# Patient Record
Sex: MALE | Race: WHITE | NOT HISPANIC OR LATINO | Employment: UNEMPLOYED | URBAN - NONMETROPOLITAN AREA
[De-identification: names, ages, dates, MRNs, and addresses within clinical notes are randomized per-mention and may not be internally consistent; named-entity substitution may affect disease eponyms.]

---

## 2018-01-29 ENCOUNTER — OFFICE VISIT (OUTPATIENT)
Dept: NEUROSURGERY | Facility: CLINIC | Age: 58
End: 2018-01-29

## 2018-01-29 VITALS — BODY MASS INDEX: 29.03 KG/M2 | HEIGHT: 69 IN | WEIGHT: 196 LBS

## 2018-01-29 DIAGNOSIS — R20.0 NUMBNESS IN FEET: ICD-10-CM

## 2018-01-29 DIAGNOSIS — I73.9 CLAUDICATION OF BOTH LOWER EXTREMITIES (HCC): ICD-10-CM

## 2018-01-29 DIAGNOSIS — M79.605 PAIN IN BOTH LOWER EXTREMITIES: ICD-10-CM

## 2018-01-29 DIAGNOSIS — M79.604 PAIN IN BOTH LOWER EXTREMITIES: ICD-10-CM

## 2018-01-29 DIAGNOSIS — M51.36 DEGENERATION OF LUMBAR INTERVERTEBRAL DISC: Primary | ICD-10-CM

## 2018-01-29 DIAGNOSIS — Z78.9 TOBACCO NON-USER: ICD-10-CM

## 2018-01-29 PROCEDURE — 99204 OFFICE O/P NEW MOD 45 MIN: CPT | Performed by: NEUROLOGICAL SURGERY
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
## 2018-01-30 DIAGNOSIS — I73.9 CLAUDICATION OF BOTH LOWER EXTREMITIES (HCC): Primary | ICD-10-CM

## 2018-01-30 DIAGNOSIS — R20.0 NUMBNESS IN FEET: ICD-10-CM

## 2018-02-16 DIAGNOSIS — M79.604 PAIN IN BOTH LOWER EXTREMITIES: ICD-10-CM

## 2018-02-16 DIAGNOSIS — R20.0 NUMBNESS IN FEET: ICD-10-CM

## 2018-02-16 DIAGNOSIS — M51.36 DEGENERATION OF LUMBAR INTERVERTEBRAL DISC: ICD-10-CM

## 2018-02-16 DIAGNOSIS — M79.605 PAIN IN BOTH LOWER EXTREMITIES: ICD-10-CM

## 2018-02-26 ENCOUNTER — TELEPHONE (OUTPATIENT)
Dept: NEUROSURGERY | Facility: CLINIC | Age: 58
End: 2018-02-26

## 2018-02-26 ENCOUNTER — HOSPITAL ENCOUNTER (OUTPATIENT)
Dept: MRI IMAGING | Facility: HOSPITAL | Age: 58
Discharge: HOME | End: 2018-02-26
Attending: NEUROLOGICAL SURGERY | Admitting: NEUROLOGICAL SURGERY

## 2018-02-26 ENCOUNTER — OFFICE VISIT (OUTPATIENT)
Dept: NEUROSURGERY | Facility: CLINIC | Age: 58
End: 2018-02-26

## 2018-02-26 ENCOUNTER — LAB (OUTPATIENT)
Dept: LAB | Facility: HOSPITAL | Age: 58
End: 2018-02-26
Attending: NEUROLOGICAL SURGERY

## 2018-02-26 ENCOUNTER — HOSPITAL ENCOUNTER (OUTPATIENT)
Dept: ULTRASOUND IMAGING | Facility: HOSPITAL | Age: 58
Discharge: HOME | End: 2018-02-26
Attending: NEUROLOGICAL SURGERY

## 2018-02-26 VITALS
BODY MASS INDEX: 28.79 KG/M2 | DIASTOLIC BLOOD PRESSURE: 82 MMHG | HEIGHT: 68 IN | WEIGHT: 190 LBS | SYSTOLIC BLOOD PRESSURE: 126 MMHG

## 2018-02-26 DIAGNOSIS — M51.36 DEGENERATION OF LUMBAR INTERVERTEBRAL DISC: ICD-10-CM

## 2018-02-26 DIAGNOSIS — M51.16 LUMBAR DISC HERNIATION WITH RADICULOPATHY: Primary | ICD-10-CM

## 2018-02-26 DIAGNOSIS — M51.16 LUMBAR DISC HERNIATION WITH RADICULOPATHY: ICD-10-CM

## 2018-02-26 DIAGNOSIS — Z78.9 TOBACCO NON-USER: ICD-10-CM

## 2018-02-26 DIAGNOSIS — E55.9 VITAMIN D DEFICIENCY: Primary | ICD-10-CM

## 2018-02-26 DIAGNOSIS — R20.0 NUMBNESS IN FEET: ICD-10-CM

## 2018-02-26 DIAGNOSIS — I73.9 CLAUDICATION OF BOTH LOWER EXTREMITIES (HCC): ICD-10-CM

## 2018-02-26 DIAGNOSIS — E58 CALCIUM DEFICIENCY: ICD-10-CM

## 2018-02-26 DIAGNOSIS — M79.605 PAIN IN BOTH LOWER EXTREMITIES: ICD-10-CM

## 2018-02-26 DIAGNOSIS — M79.604 PAIN IN BOTH LOWER EXTREMITIES: ICD-10-CM

## 2018-02-26 LAB
25(OH)D3 SERPL-MCNC: 27.6 NG/ML (ref 30–100)
CALCIUM SPEC-SCNC: 9.8 MG/DL (ref 8.4–10.4)
CREAT BLDA-MCNC: 1.1 MG/DL (ref 0.6–1.3)

## 2018-02-26 PROCEDURE — 99213 OFFICE O/P EST LOW 20 MIN: CPT | Performed by: NEUROLOGICAL SURGERY

## 2018-02-26 PROCEDURE — 72158 MRI LUMBAR SPINE W/O & W/DYE: CPT

## 2018-02-26 PROCEDURE — 82310 ASSAY OF CALCIUM: CPT | Performed by: NEUROLOGICAL SURGERY

## 2018-02-26 PROCEDURE — 36415 COLL VENOUS BLD VENIPUNCTURE: CPT

## 2018-02-26 PROCEDURE — 93924 LWR XTR VASC STDY BILAT: CPT | Performed by: SURGERY

## 2018-02-26 PROCEDURE — A9577 INJ MULTIHANCE: HCPCS | Performed by: NEUROLOGICAL SURGERY

## 2018-02-26 PROCEDURE — 93922 UPR/L XTREMITY ART 2 LEVELS: CPT

## 2018-02-26 PROCEDURE — 82306 VITAMIN D 25 HYDROXY: CPT | Performed by: NEUROLOGICAL SURGERY

## 2018-02-26 PROCEDURE — 0 GADOBENATE DIMEGLUMINE 529 MG/ML SOLUTION: Performed by: NEUROLOGICAL SURGERY

## 2018-02-26 PROCEDURE — 82565 ASSAY OF CREATININE: CPT

## 2018-02-26 RX ORDER — CALCIUM CARBONATE 500(1250)
500 TABLET ORAL 2 TIMES DAILY
Qty: 60 TABLET | Refills: 5 | Status: SHIPPED | OUTPATIENT
Start: 2018-02-26 | End: 2018-03-28

## 2018-02-26 RX ORDER — ERGOCALCIFEROL 1.25 MG/1
50000 CAPSULE ORAL WEEKLY
Qty: 4 CAPSULE | Refills: 0 | Status: SHIPPED | OUTPATIENT
Start: 2018-02-26 | End: 2018-03-20

## 2018-02-26 RX ADMIN — GADOBENATE DIMEGLUMINE 17 ML: 529 INJECTION, SOLUTION INTRAVENOUS at 09:15

## 2018-02-26 NOTE — TELEPHONE ENCOUNTER
----- Message from Mehran Almaraz MD sent at 2/26/2018  1:50 PM CST -----  Vitamin D  50,000 international units by mouth per week ×4 weeks followed by,  1000 international units by mouth daily    Calcium  The [place] [employer] (IOM) has issued recommendations for calcium and vitamin D daily intake in older adults.     Men, 51-70 years = 1000 mg/day of calcium     He needs to start this today.
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
## 2018-03-01 DIAGNOSIS — M51.36 DEGENERATION OF LUMBAR INTERVERTEBRAL DISC: ICD-10-CM

## 2018-03-01 DIAGNOSIS — I73.9 CLAUDICATION OF BOTH LOWER EXTREMITIES (HCC): ICD-10-CM

## 2018-03-01 DIAGNOSIS — M51.16 LUMBAR DISC HERNIATION WITH RADICULOPATHY: Primary | ICD-10-CM

## 2018-03-02 ENCOUNTER — APPOINTMENT (OUTPATIENT)
Dept: MRI IMAGING | Facility: HOSPITAL | Age: 58
End: 2018-03-02
Attending: NEUROLOGICAL SURGERY

## 2018-03-05 ENCOUNTER — APPOINTMENT (OUTPATIENT)
Dept: ULTRASOUND IMAGING | Facility: HOSPITAL | Age: 58
End: 2018-03-05
Attending: NEUROLOGICAL SURGERY

## 2018-03-20 ENCOUNTER — APPOINTMENT (OUTPATIENT)
Dept: PREADMISSION TESTING | Facility: HOSPITAL | Age: 58
End: 2018-03-20

## 2018-03-20 ENCOUNTER — HOSPITAL ENCOUNTER (OUTPATIENT)
Dept: PULMONOLOGY | Facility: HOSPITAL | Age: 58
Discharge: HOME | End: 2018-03-20
Attending: NEUROLOGICAL SURGERY | Admitting: NEUROLOGICAL SURGERY

## 2018-03-20 ENCOUNTER — HOSPITAL ENCOUNTER (OUTPATIENT)
Dept: GENERAL RADIOLOGY | Facility: HOSPITAL | Age: 58
Discharge: HOME | End: 2018-03-20
Attending: NEUROLOGICAL SURGERY

## 2018-03-20 VITALS
RESPIRATION RATE: 14 BRPM | WEIGHT: 189.6 LBS | DIASTOLIC BLOOD PRESSURE: 86 MMHG | SYSTOLIC BLOOD PRESSURE: 145 MMHG | HEART RATE: 63 BPM | BODY MASS INDEX: 28.08 KG/M2 | OXYGEN SATURATION: 97 % | HEIGHT: 69 IN

## 2018-03-20 DIAGNOSIS — I73.9 CLAUDICATION OF BOTH LOWER EXTREMITIES (HCC): ICD-10-CM

## 2018-03-20 DIAGNOSIS — M51.16 LUMBAR DISC HERNIATION WITH RADICULOPATHY: ICD-10-CM

## 2018-03-20 DIAGNOSIS — Z78.9 TOBACCO NON-USER: ICD-10-CM

## 2018-03-20 DIAGNOSIS — M51.36 DEGENERATION OF LUMBAR INTERVERTEBRAL DISC: ICD-10-CM

## 2018-03-20 LAB
ANION GAP SERPL CALCULATED.3IONS-SCNC: 11 MMOL/L (ref 4–13)
BUN BLD-MCNC: 12 MG/DL (ref 5–21)
BUN/CREAT SERPL: 11 (ref 7–25)
CALCIUM SPEC-SCNC: 9.9 MG/DL (ref 8.4–10.4)
CHLORIDE SERPL-SCNC: 100 MMOL/L (ref 98–110)
CO2 SERPL-SCNC: 30 MMOL/L (ref 24–31)
CREAT BLD-MCNC: 1.09 MG/DL (ref 0.5–1.4)
DEPRECATED RDW RBC AUTO: 42.1 FL (ref 40–54)
ERYTHROCYTE [DISTWIDTH] IN BLOOD BY AUTOMATED COUNT: 13.8 % (ref 12–15)
GFR SERPL CREATININE-BSD FRML MDRD: 70 ML/MIN/1.73
GLUCOSE BLD-MCNC: 77 MG/DL (ref 70–100)
HCT VFR BLD AUTO: 44.2 % (ref 40–52)
HGB BLD-MCNC: 14.9 G/DL (ref 14–18)
MCH RBC QN AUTO: 28.3 PG (ref 28–32)
MCHC RBC AUTO-ENTMCNC: 33.7 G/DL (ref 33–36)
MCV RBC AUTO: 83.9 FL (ref 82–95)
PLATELET # BLD AUTO: 264 10*3/MM3 (ref 130–400)
PMV BLD AUTO: 10.4 FL (ref 6–12)
POTASSIUM BLD-SCNC: 3.9 MMOL/L (ref 3.5–5.3)
RBC # BLD AUTO: 5.27 10*6/MM3 (ref 4.8–5.9)
SODIUM BLD-SCNC: 141 MMOL/L (ref 135–145)
WBC NRBC COR # BLD: 10.62 10*3/MM3 (ref 4.8–10.8)

## 2018-03-20 PROCEDURE — 36415 COLL VENOUS BLD VENIPUNCTURE: CPT

## 2018-03-20 PROCEDURE — 93005 ELECTROCARDIOGRAM TRACING: CPT

## 2018-03-20 PROCEDURE — 94010 BREATHING CAPACITY TEST: CPT

## 2018-03-20 PROCEDURE — 93010 ELECTROCARDIOGRAM REPORT: CPT | Performed by: INTERNAL MEDICINE

## 2018-03-20 PROCEDURE — 72170 X-RAY EXAM OF PELVIS: CPT

## 2018-03-20 PROCEDURE — 85027 COMPLETE CBC AUTOMATED: CPT | Performed by: NEUROLOGICAL SURGERY

## 2018-03-20 PROCEDURE — 80048 BASIC METABOLIC PNL TOTAL CA: CPT | Performed by: NEUROLOGICAL SURGERY

## 2018-03-20 RX ORDER — ACETAMINOPHEN 325 MG/1
650 TABLET ORAL EVERY 6 HOURS PRN
COMMUNITY
End: 2018-04-03 | Stop reason: HOSPADM

## 2018-03-20 NOTE — DISCHARGE INSTRUCTIONS
DAY OF SURGERY INSTRUCTIONS        YOUR SURGEON: DR TIARA AVERY/ DR EFREM FELICIANO      PROCEDURE: LUMBAR LAMINECTOMY - ANTERIOR LUMBAR INTERBODY FUSION, LUMBAR 5- SACRAL 1, ANTERIOR FUSION AND THEN POSTERIOR MINIMALLY INVASIVE LUMBAR 5 TO SACRAL 1 - LEFT DECOMPRESSION AND INSTRUMENTATION, O- ARM ANTERIOR LUMBAR EXPOSURE    DATE OF SURGERY: MARCH 30, 2018    ARRIVAL TIME: AS DIRECTED BY OFFICE    DAY OF SURGERY TAKE ONLY THESE MEDICATIONS: NONE        BEFORE YOU COME TO THE HOSPITAL  (Pre-op instructions)  • Do not eat, drink, smoke or chew gum after midnight the night before surgery.  This also includes no mints.  • Morning of surgery take only the medicines you have been instructed with a sip of water unless otherwise instructed  by your physician.  • Do not shave, wear makeup or dark nail polish.  • Remove all jewelry including rings.  • Leave anything you consider valuable at home.  • Leave your suitcase in the car until after your surgery.  • Bring the following with you if applicable:  o Picture ID and insurance, Medicare or Medicaid cards  o Co-pay/deductible required by insurance (cash, check, credit card)  o Copy of advance directive, living will or power-of- documents if not brought to PAT  o CPAP or BIPAP mask and tubing  o Relaxation aids (MP3 player, book, magazine)  • On the day of surgery check in at registration located at the main entrance of the hospital.       Outpatient Surgery Guidelines, Adult  Outpatient procedures are those for which the person having the procedure is allowed to go home the same day as the procedure. Various procedures are done on an outpatient basis. You should follow some general guidelines if you will be having an outpatient procedure.  LET YOUR HEALTH CARE PROVIDER KNOW ABOUT:  · Any allergies you have.  · All medicines you are taking, including vitamins, herbs, eye drops, creams, and over-the-counter medicines.  · Previous problems you or members of your  family have had with the use of anesthetics.  · Any blood disorders you have.  · Previous surgeries you have had.  · Medical conditions you have.  RISKS AND COMPLICATIONS  Your health care provider will discuss possible risks and complications with you before surgery. Common risks and complications include:    · Problems due to the use of anesthetics.  · Blood loss and replacement (does not apply to minor surgical procedures).  · Temporary increase in pain due to surgery.  · Uncorrected pain or problems that the surgery was meant to correct.  · Infection.  · New damage.  BEFORE THE PROCEDURE  · Ask your health care provider about changing or stopping your regular medicines. You may need to stop taking certain medicines in the days or weeks before the procedure.  · Stop smoking at least 2 weeks before surgery. This lowers your risk for complications during and after surgery. Ask your health care provider for help with this if needed.  · Eat your usual meals and a light supper the day before surgery. Continue fluid intake. Do not drink alcohol.  · Do not eat or drink after midnight the night before your surgery.   · Arrange for someone to take you home and to stay with you for 24 hours after the procedure. Medicine given for your procedure may affect your ability to drive or to care for yourself.  · Call your health care provider's office if you develop an illness or problem that may prevent you from safely having your procedure.  AFTER THE PROCEDURE  After surgery, you will be taken to a recovery area, where your progress will be monitored. If there are no complications, you will be allowed to go home when you are awake, stable, and taking fluids well. You may have numbness around the surgical site. Healing will take some time. You will have tenderness at the surgical site and may have some swelling and bruising. You may also have some nausea.  HOME CARE INSTRUCTIONS  · Do not drive for 24 hours, or as directed by  your health care provider. Do not drive while taking prescription pain medicines.  · Do not drink alcohol for 24 hours.  · Do not make important decisions or sign legal documents for 24 hours.  · You may resume a normal diet and activities as directed.  · Do not lift anything heavier than 10 pounds (4.5 kg) or play contact sports until your health care provider says it is okay.  · Change your bandages (dressings) as directed.  · Only take over-the-counter or prescription medicines as directed by your health care provider.  · Follow up with your health care provider as directed.  SEEK MEDICAL CARE IF:  · You have increased bleeding (more than a small spot) from the surgical site.  · You have redness, swelling, or increasing pain in the wound.  · You see pus coming from the wound.  · You have a fever.  · You notice a bad smell coming from the wound or dressing.  · You feel lightheaded or faint.  · You develop a rash.  · You have trouble breathing.  · You develop allergies.  MAKE SURE YOU:  · Understand these instructions.  · Will watch your condition.  · Will get help right away if you are not doing well or get worse.     This information is not intended to replace advice given to you by your health care provider. Make sure you discuss any questions you have with your health care provider.     Document Released: 09/12/2002 Document Revised: 05/03/2016 Document Reviewed: 05/22/2014  [employer] Interactive Patient Education ©2016 [employer]       Fall Prevention in Hospitals, Adult  As a hospital patient, your condition and the treatments you receive can increase your risk for falls. Some additional risk factors for falls in a hospital include:  · Being in an unfamiliar environment.  · Being on bed rest.  · Your surgery.  · Taking certain medicines.  · Your tubing requirements, such as intravenous (IV) therapy or catheters.  It is important that you learn how to decrease fall risks while at the hospital. Below are  important tips that can help prevent falls.  SAFETY TIPS FOR PREVENTING FALLS  Talk about your risk of falling.  · Ask your health care provider why you are at risk for falling. Is it your medicine, illness, tubing placement, or something else?  · Make a plan with your health care provider to keep you safe from falls.  · Ask your health care provider or pharmacist about side effects of your medicines. Some medicines can make you dizzy or affect your coordination.  Ask for help.  · Ask for help before getting out of bed. You may need to press your call button.  · Ask for assistance in getting safely to the toilet.  · Ask for a walker or cane to be put at your bedside. Ask that most of the side rails on your bed be placed up before your health care provider leaves the room.  · Ask family or friends to sit with you.  · Ask for things that are out of your reach, such as your glasses, hearing aids, telephone, bedside table, or call button.  Follow these tips to avoid falling:  · Stay lying or seated, rather than standing, while waiting for help.  · Wear rubber-soled slippers or shoes whenever you walk in the hospital.  · Avoid quick, sudden movements.  ¨ Change positions slowly.  ¨ Sit on the side of your bed before standing.  ¨ Stand up slowly and wait before you start to walk.  · Let your health care provider know if there is a spill on the floor.  · Pay careful attention to the medical equipment, electrical cords, and tubes around you.  · When you need help, use your call button by your bed or in the bathroom. Wait for one of your health care providers to help you.  · If you feel dizzy or unsure of your footing, return to bed and wait for assistance.  · Avoid being distracted by the TV, telephone, or another person in your room.  · Do not lean or support yourself on rolling objects, such as IV poles or bedside tables.     This information is not intended to replace advice given to you by your health care provider.  Make sure you discuss any questions you have with your health care provider.     Document Released: 12/15/2001 Document Revised: 01/08/2016 Document Reviewed: 08/25/2013  [employer] Interactive Patient Education ©2016 [employer]       Surgical Site Infections FAQs  What is a Surgical Site Infection (SSI)?  A surgical site infection is an infection that occurs after surgery in the part of the body where the surgery took place. Most patients who have surgery do not develop an infection. However, infections develop in about 1 to 3 out of every 100 patients who have surgery.  Some of the common symptoms of a surgical site infection are:  · Redness and pain around the area where you had surgery  · Drainage of cloudy fluid from your surgical wound  · Fever  Can SSIs be treated?  Yes. Most surgical site infections can be treated with antibiotics. The antibiotic given to you depends on the bacteria (germs) causing the infection. Sometimes patients with SSIs also need another surgery to treat the infection.  What are some of the things that hospitals are doing to prevent SSIs?  To prevent SSIs, doctors, nurses, and other healthcare providers:  · Clean their hands and arms up to their elbows with an antiseptic agent just before the surgery.  · Clean their hands with soap and water or an alcohol-based hand rub before and after caring for each patient.  · May remove some of your hair immediately before your surgery using electric clippers if the hair is in the same area where the procedure will occur. They should not shave you with a razor.  · Wear special hair covers, masks, gowns, and gloves during surgery to keep the surgery area clean.  · Give you antibiotics before your surgery starts. In most cases, you should get antibiotics within 60 minutes before the surgery starts and the antibiotics should be stopped within 24 hours after surgery.  · Clean the skin at the site of your surgery with a special soap that kills  germs.  What can I do to help prevent SSIs?  Before your surgery:  · Tell your doctor about other medical problems you may have. Health problems such as allergies, diabetes, and obesity could affect your surgery and your treatment.  · Quit smoking. Patients who smoke get more infections. Talk to your doctor about how you can quit before your surgery.  · Do not shave near where you will have surgery. Shaving with a razor can irritate your skin and make it easier to develop an infection.  At the time of your surgery:  · Speak up if someone tries to shave you with a razor before surgery. Ask why you need to be shaved and talk with your surgeon if you have any concerns.  · Ask if you will get antibiotics before surgery.  After your surgery:  · Make sure that your healthcare providers clean their hands before examining you, either with soap and water or an alcohol-based hand rub.  · If you do not see your providers clean their hands, please ask them to do so.  · Family and friends who visit you should not touch the surgical wound or dressings.  · Family and friends should clean their hands with soap and water or an alcohol-based hand rub before and after visiting you. If you do not see them clean their hands, ask them to clean their hands.  What do I need to do when I go home from the hospital?  · Before you go home, your doctor or nurse should explain everything you need to know about taking care of your wound. Make sure you understand how to care for your wound before you leave the hospital.  · Always clean your hands before and after caring for your wound.  · Before you go home, make sure you know who to contact if you have questions or problems after you get home.  · If you have any symptoms of an infection, such as redness and pain at the surgery site, drainage, or fever, call your doctor immediately.  If you have additional questions, please ask your doctor or nurse.  Developed and co-sponsored by The Society for  Healthcare Epidemiology of [place] (SHEA); Infectious Diseases Society of [place] (IDSA); American Hospital Association; Association for Professionals in Infection Control and Epidemiology (APIC); Centers for Disease Control and Prevention (CDC); and The Joint Commission.     This information is not intended to replace advice given to you by your health care provider. Make sure you discuss any questions you have with your health care provider.     Document Released: 12/23/2014 Document Revised: 01/08/2016 Document Reviewed: 03/02/2016  [employer] Interactive Patient Education ©2016 [employer]       [facility]  CHG 4% Patient Instruction Sheet    Preparing the Skin Before Surgery  Preparing or “prepping” skin before surgery can reduce the risk of infection at the surgical site. To make the process easier,[facility] has chosen 4% Chlorhexidine Gluconate (CHG) antiseptic solution.   The steps below outline the prepping process and should be carefully followed.                                                                                                                                                      Prep the skin at the following time(s):                                                      We recommend you shower the night before surgery, and again the morning of surgery with the 4% CHG antiseptic solution using half of the bottle and a cloth each time.  Dress in clean clothes/sleepwear after showering.  See instructions below for application.          Do not apply any lotions or moisturizers.       Do not shave the area to be prepped for at least 2 days prior to surgery.    Clipping the hair may be done immediately prior to your surgery at the hospital    if needed.    Directions:  Thoroughly rinse your body with water.  Apply 4% CHG to a cloth and wash skin gently, paying special attention to the operative site.  Rinse again thoroughly.  Once you have begun using this product do not apply anything else  to your skin. If itching or redness persists, rinse affected areas and discontinue use.    When using this product:  • Keep out of eyes, ears, and mouth.  • If solution should contact these areas, rinse out promptly and thoroughly with water.  • For external use only.  • Do not use in genital area, on your face or head.      PATIENT/FAMILY/RESPONSIBLE PARTY VERBALIZES UNDERSTANDING OF ABOVE EDUCATION.  COPY OF PAIN SCALE GIVEN AND REVIEWED WITH VERBALIZED UNDERSTANDING.

## 2018-03-27 ENCOUNTER — OFFICE VISIT (OUTPATIENT)
Dept: VASCULAR SURGERY | Facility: CLINIC | Age: 58
End: 2018-03-27

## 2018-03-27 VITALS
HEIGHT: 70 IN | SYSTOLIC BLOOD PRESSURE: 136 MMHG | HEART RATE: 70 BPM | BODY MASS INDEX: 27.92 KG/M2 | DIASTOLIC BLOOD PRESSURE: 78 MMHG | WEIGHT: 195 LBS | OXYGEN SATURATION: 98 %

## 2018-03-27 DIAGNOSIS — R20.0 NUMBNESS IN FEET: ICD-10-CM

## 2018-03-27 DIAGNOSIS — M79.605 PAIN IN BOTH LOWER EXTREMITIES: ICD-10-CM

## 2018-03-27 DIAGNOSIS — M51.16 LUMBAR DISC HERNIATION WITH RADICULOPATHY: Primary | ICD-10-CM

## 2018-03-27 DIAGNOSIS — M79.604 PAIN IN BOTH LOWER EXTREMITIES: ICD-10-CM

## 2018-03-27 PROCEDURE — 99204 OFFICE O/P NEW MOD 45 MIN: CPT | Performed by: SURGERY

## 2018-03-27 NOTE — PROGRESS NOTES
"03/27/2018      Mehran Almaraz MD  [address] [facility]  [address]  [place] [address]    Eric Caldera  1960    Chief Complaint   Patient presents with   • Follow-up     Patient here for ALIF. Patient states no stroke like symptoms       Dear Mehran Almaraz, *:      HPI  I had the pleasure of seeing your patient Eric Caldera in the office today.  Thank you kindly for this consultation.  As you recall, Eric Caldera is a 57 y.o.  male who you are currently following for back pain.  He had a new onset of back pain and left leg numbness.  Imaging showed L5/S1 foraminal stenosis, and displacement of nerve roots.  He has undergone physical therapy and stopped smoking, but continues with pain and numbness.  He is scheduled for anterior lumbar interbody fusion on 3/30/18.      Past Medical History:   Diagnosis Date   • Arthritis    • Depression    • Hypertension    • Kidney stones    • Migraine    • Thyroid disease        Past Surgical History:   Procedure Laterality Date   • ARM TENDON REPAIR Left 2011    \"Rebuilding\" of tendons   • LEG SURGERY Right 2009    Removal of bone tumor   • LUMBAR SPINE SURGERY  2005       Family History   Problem Relation Age of Onset   • Cancer Father        Social History     Social History   • Marital status:      Spouse name: N/A   • Number of children: N/A   • Years of education: N/A     Occupational History   • Not on file.     Social History Main Topics   • Smoking status: Former Smoker     Packs/day: 0.25     Years: 20.00     Types: Cigarettes     Quit date: 2/20/2018   • Smokeless tobacco: Never Used   • Alcohol use No   • Drug use: No   • Sexual activity: Defer     Other Topics Concern   • Not on file     Social History Narrative   • No narrative on file       Allergies   Allergen Reactions   • Naproxen GI Bleeding   • Nsaids GI Bleeding   • Prednisone GI Bleeding       Prior to Admission medications    Medication Sig Start Date End Date Taking? " "Authorizing Provider   acetaminophen (TYLENOL) 325 MG tablet Take 650 mg by mouth Every 6 (Six) Hours As Needed for Mild Pain .    Historical Provider, MD   busPIRone (BUSPAR) 10 MG tablet Take 10 mg by mouth 3 (Three) Times a Day.    Historical Provider, MD   Calcium 500 MG tablet Take 500 mg by mouth 2 (Two) Times a Day for 30 days. 2/26/18 3/28/18  Mehran Almaraz MD   Cholecalciferol (VITAMIN D) 1000 units tablet Take 1 tablet by mouth Daily for 30 days. 2/26/18 3/28/18  Mehran Almaraz MD   hydrOXYzine (ATARAX) 25 MG tablet Take 25 mg by mouth 3 (Three) Times a Day As Needed for Itching.    Historical Provider, MD   levothyroxine (SYNTHROID, LEVOTHROID) 75 MCG tablet Take 75 mcg by mouth Daily.    Historical Provider, MD   sertraline (ZOLOFT) 50 MG tablet Take 200 mg by mouth Daily.    Historical Provider, MD       Review of Systems   Constitutional: Negative.    HENT: Negative.    Eyes: Negative.    Respiratory: Negative.    Cardiovascular: Negative.    Gastrointestinal: Negative.    Endocrine: Negative.    Genitourinary: Negative.    Musculoskeletal: Positive for back pain, gait problem and myalgias.   Skin: Negative.    Allergic/Immunologic: Negative.    Neurological: Positive for numbness.   Hematological: Negative.    Psychiatric/Behavioral: Negative.    All other systems reviewed and are negative.      /78   Pulse 70   Ht 176.5 cm (69.5\")   Wt 88.5 kg (195 lb)   SpO2 98%   BMI 28.38 kg/m²   Physical Exam   Constitutional: He is oriented to person, place, and time. He appears well-developed and well-nourished.   HENT:   Head: Normocephalic and atraumatic.   Eyes: Pupils are equal, round, and reactive to light. No scleral icterus.   Neck: Neck supple. No JVD present. Carotid bruit is not present. No thyromegaly present.   Cardiovascular: Normal rate, regular rhythm and normal heart sounds.    Pulses:       Carotid pulses are 2+ on the right side, and 2+ on the left side.       " Femoral pulses are 2+ on the right side, and 2+ on the left side.       Popliteal pulses are 2+ on the right side, and 2+ on the left side.        Dorsalis pedis pulses are 2+ on the right side, and 2+ on the left side.        Posterior tibial pulses are 2+ on the right side, and 2+ on the left side.   Pulmonary/Chest: Effort normal and breath sounds normal.   Abdominal: Soft. Bowel sounds are normal. He exhibits no distension, no abdominal bruit and no mass. There is no hepatosplenomegaly. There is no tenderness.   Musculoskeletal: Normal range of motion. He exhibits no edema.   Lymphadenopathy:     He has no cervical adenopathy.   Neurological: He is alert and oriented to person, place, and time. He has normal strength. No cranial nerve deficit or sensory deficit.   Skin: Skin is warm, dry and intact.   Psychiatric: He has a normal mood and affect.   Nursing note and vitals reviewed.      Xr Pelvis 1 Or 2 Vw    Result Date: 3/20/2018  Narrative: EXAMINATION:  XR PELVIS 1 OR 2 VW-  3/20/2018 12:32 PM CDT  HISTORY: Lumbar disc herniation, needing XR for surgical determination; M51.16-Intervertebral disc disorders with radiculopathy, lumbar region; I73.9-Peripheral vascular disease, unspecified; M51.36-Other intervertebral disc degeneration, lumbar region  COMPARISON: No comparison study.  FINDINGS:  AP and lateral views were obtained of the pelvis. The hip joint spaces are maintained. There is minimal enthesopathy of the pelvis. The SI joints are normal. There are some degenerative changes of the visualized lumbar spine. Coarse calcifications in the left pelvis are probably phleboliths.      Impression: No acute findings.   This report was finalized on 03/20/2018 13:09 by Dr. Андрей Gil MD.    Us Ankle / Brachial Indices Extremity Limited    Result Date: 2/26/2018  Narrative:  History: Claudication  Comments: Bilateral lower extremity arterial with multi-level pulse volume recordings and segmental pressures were  performed at rest and stress.  The right ankle/brachial index is 1.15. The waveforms are triphasic without dampening.These findings are consistent with no significant arterial insufficiency of the right lower extremity at rest.  The postexercise ABIs 1.37.  The left ankle/brachial index is 1.20. The waveforms are triphasic without dampening. These findings are consistent with no significant arterial insufficiency of the left lower extremity at rest.   Postexercise ABIs 1.43.      Impression: Impression: 1. No significant arterial insufficiency of the right lower extremity at rest. 2. No significant arterial insufficiency of the left lower extremity at rest.   This report was finalized on 02/26/2018 14:22 by Dr. Samm Robb MD.    Mri Lumbar Spine With & Without Contrast    Result Date: 2/26/2018  Narrative:  History: 57-year-old evaluated for lumbar disc degeneration. Bilateral lower extremity pain and numbness. History of back surgery in 2005. Per chart review the surgery was related to removal of a bone tumor.  Reference Lumbar spine MRI September 2014.  Technique Pre and postcontrast enhanced lumbar spine MRI was performed.  Findings: Minimal subchondral edema involving the anterior/superior L2 vertebrae. Appearance is unchanged and this is of doubtful significance. Minimal endplate degenerative changes involving the inferior right L5 vertebral body. There are no suspicious marrow lesions. Lumbar vertebral body heights are unchanged. Alignment is normal. The conus is barely imaged but appears to in that T12.. There are postoperative changes in the dorsal superficial soft tissues at the level of L5. There is suspicion for left laminotomy at L5-S1 although partially imaged on axial sequences.  L1-L2 Minimal disc bulge without spinal stenosis. No neuroforaminal narrowing.  L2-L3 No spinal canal or foraminal narrowing.  L3-L4 Small left foraminal protrusion causing moderate left neuroforaminal narrowing. Mild  narrowing of the right neural foramen. There is no spinal stenosis.  L4-L5 Mild broad-based disc bulge with osteophyte component in the right neural foramen. Mild facet arthropathy. Findings result in moderate right neuroforaminal narrowing mild to moderate left neuroforaminal narrowing.  L5-S1 Superimposed upon a broad-based disc bulge is a left paramedian inferior extrusion measuring approximately 11 mm in craniocaudal dimension and causing left lateral recess stenosis. Size of this extrusion has decreased from 2014 likely from discectomy. There is suspected left laminotomy at this level. There is enhancement surrounding the mildly enlarged descending left S1 nerve root. Given lack of correlating T2 hyperintensity, this is felt to reflect vascular changes related to surgery rather than perineural fibrosis. There is moderate bilateral neural foraminal narrowing.       Impression: Postoperative changes at L5-S1 as described. Variable neuroforaminal narrowing.  This report was finalized on 02/26/2018 09:44 by  Dennis Jimenes, .      Patient Active Problem List   Diagnosis   • Degeneration of lumbar intervertebral disc   • Pain in both lower extremities   • Numbness in feet   • Claudication of both lower extremities   • BMI 28.0-28.9,adult   • Tobacco non-user   • Lumbar disc herniation with radiculopathy         ICD-10-CM ICD-9-CM   1. Lumbar disc herniation with radiculopathy M51.16 722.10   2. Pain in both lower extremities M79.604 729.5    M79.605    3. Numbness in feet R20.0 782.0       Lab Frequency Next Occurrence   Type and screen Once 02/26/2018       Plan: After thoroughly evaluating Eric RAMIREZ Werner, I believe the best course of action is to proceed with anterior lumbar interbody fusion Of L5-S1.  Risks of ALIF were discussed and include, but are not limited to, bleeding, infection, nerve damage, vessel damage, retrograde ejaculation, bowel damage, ureteral damage, DVT, MI, stroke, and death.  The patient  understands these risks and wishes to proceed with procedure.   The patient can continue taking her current medication regimen as previously planned.  This was all discussed in full with complete understanding.    Thank you for allowing me to participate in the care of your patient.  Please do not hesitate with any questions or concerns.  I will keep you aware of any further encounters with Eric Caldera.        Sincerely yours,         Samm Robb, YOLANDA Ocasio

## 2018-03-29 ENCOUNTER — ANESTHESIA EVENT (OUTPATIENT)
Dept: PERIOP | Facility: HOSPITAL | Age: 58
End: 2018-03-29

## 2018-03-30 ENCOUNTER — APPOINTMENT (OUTPATIENT)
Dept: GENERAL RADIOLOGY | Facility: HOSPITAL | Age: 58
End: 2018-03-30

## 2018-03-30 ENCOUNTER — HOSPITAL ENCOUNTER (INPATIENT)
Facility: HOSPITAL | Age: 58
LOS: 4 days | Discharge: HOME | End: 2018-04-03
Attending: NEUROLOGICAL SURGERY | Admitting: NEUROLOGICAL SURGERY

## 2018-03-30 ENCOUNTER — ANESTHESIA (OUTPATIENT)
Dept: PERIOP | Facility: HOSPITAL | Age: 58
End: 2018-03-30

## 2018-03-30 DIAGNOSIS — Z78.9 DECREASED ACTIVITIES OF DAILY LIVING (ADL): ICD-10-CM

## 2018-03-30 DIAGNOSIS — Z74.09 IMPAIRED MOBILITY: ICD-10-CM

## 2018-03-30 DIAGNOSIS — M51.16 LUMBAR DISC HERNIATION WITH RADICULOPATHY: ICD-10-CM

## 2018-03-30 LAB
ABO GROUP BLD: NORMAL
BLD GP AB SCN SERPL QL: NEGATIVE
RH BLD: POSITIVE
T&S EXPIRATION DATE: NORMAL

## 2018-03-30 PROCEDURE — 94799 UNLISTED PULMONARY SVC/PX: CPT

## 2018-03-30 PROCEDURE — 72100 X-RAY EXAM L-S SPINE 2/3 VWS: CPT

## 2018-03-30 PROCEDURE — 22856 TOT DISC ARTHRP 1NTRSPC CRV: CPT | Performed by: NEUROLOGICAL SURGERY

## 2018-03-30 PROCEDURE — 76380 CAT SCAN FOLLOW-UP STUDY: CPT

## 2018-03-30 PROCEDURE — 01NB0ZZ RELEASE LUMBAR NERVE, OPEN APPROACH: ICD-10-PCS | Performed by: NEUROLOGICAL SURGERY

## 2018-03-30 PROCEDURE — 25010000003 CEFAZOLIN PER 500 MG: Performed by: NEUROLOGICAL SURGERY

## 2018-03-30 PROCEDURE — 0ST40ZZ RESECTION OF LUMBOSACRAL DISC, OPEN APPROACH: ICD-10-PCS | Performed by: NEUROLOGICAL SURGERY

## 2018-03-30 PROCEDURE — C1713 ANCHOR/SCREW BN/BN,TIS/BN: HCPCS | Performed by: NEUROLOGICAL SURGERY

## 2018-03-30 PROCEDURE — 25010000002 MIDAZOLAM PER 1 MG: Performed by: ANESTHESIOLOGY

## 2018-03-30 PROCEDURE — 25010000002 HYDROMORPHONE HCL-NACL 50-0.9 MG/50ML-% SOLUTION PREFILLED SYRINGE: Performed by: NEUROLOGICAL SURGERY

## 2018-03-30 PROCEDURE — 63030 LAMOT DCMPRN NRV RT 1 LMBR: CPT | Performed by: NEUROLOGICAL SURGERY

## 2018-03-30 PROCEDURE — 74018 RADEX ABDOMEN 1 VIEW: CPT

## 2018-03-30 PROCEDURE — 25010000002 ONDANSETRON PER 1 MG: Performed by: ANESTHESIOLOGY

## 2018-03-30 PROCEDURE — 25010000002 PROPOFOL 1000 MG/ML EMULSION: Performed by: NURSE ANESTHETIST, CERTIFIED REGISTERED

## 2018-03-30 PROCEDURE — 86901 BLOOD TYPING SEROLOGIC RH(D): CPT | Performed by: NEUROLOGICAL SURGERY

## 2018-03-30 PROCEDURE — 25810000003 SODIUM CHLORIDE 0.9 % WITH KCL 20 MEQ 20-0.9 MEQ/L-% SOLUTION: Performed by: NEUROLOGICAL SURGERY

## 2018-03-30 PROCEDURE — 86850 RBC ANTIBODY SCREEN: CPT | Performed by: NEUROLOGICAL SURGERY

## 2018-03-30 PROCEDURE — 25010000002 HYDROMORPHONE PER 4 MG: Performed by: ANESTHESIOLOGY

## 2018-03-30 PROCEDURE — 76000 FLUOROSCOPY <1 HR PHYS/QHP: CPT

## 2018-03-30 PROCEDURE — 25010000002 PROPOFOL 10 MG/ML EMULSION: Performed by: NURSE ANESTHETIST, CERTIFIED REGISTERED

## 2018-03-30 PROCEDURE — 22558 ARTHRD ANT NTRBD MIN DSC LUM: CPT | Performed by: SURGERY

## 2018-03-30 PROCEDURE — 0SH304Z INSERTION OF INTERNAL FIXATION DEVICE INTO LUMBOSACRAL JOINT, OPEN APPROACH: ICD-10-PCS | Performed by: NEUROLOGICAL SURGERY

## 2018-03-30 PROCEDURE — 4A11X4G MONITORING OF PERIPHERAL NERVOUS ELECTRICAL ACTIVITY, INTRAOPERATIVE, EXTERNAL APPROACH: ICD-10-PCS | Performed by: NEUROLOGICAL SURGERY

## 2018-03-30 PROCEDURE — 25010000002 SUCCINYLCHOLINE PER 20 MG: Performed by: NURSE ANESTHETIST, CERTIFIED REGISTERED

## 2018-03-30 PROCEDURE — 25010000002 MORPHINE SULFATE (PF) 2 MG/ML SOLUTION: Performed by: ANESTHESIOLOGY

## 2018-03-30 PROCEDURE — 86900 BLOOD TYPING SEROLOGIC ABO: CPT | Performed by: NEUROLOGICAL SURGERY

## 2018-03-30 PROCEDURE — 0SG30A0 FUSION OF LUMBOSACRAL JOINT WITH INTERBODY FUSION DEVICE, ANTERIOR APPROACH, ANTERIOR COLUMN, OPEN APPROACH: ICD-10-PCS | Performed by: NEUROLOGICAL SURGERY

## 2018-03-30 DEVICE — BONE GRAFT KIT 7510200 INFUSE SMALL
Type: IMPLANTABLE DEVICE | Site: SPINE LUMBAR | Status: FUNCTIONAL
Brand: INFUSE® BONE GRAFT

## 2018-03-30 DEVICE — SCREW 7975530 SOVEREIGN FA 5.5 X 30MM
Type: IMPLANTABLE DEVICE | Site: SPINE LUMBAR | Status: FUNCTIONAL
Brand: SOVEREIGN™ SPINAL SYSTEM

## 2018-03-30 DEVICE — MAS 54850017545 4.75 EXT TAB CANN 7.5X45
Type: IMPLANTABLE DEVICE | Site: SPINE LUMBAR | Status: FUNCTIONAL
Brand: CD HORIZON® SOLERA® SPINAL SYSTEM

## 2018-03-30 DEVICE — DBM T43103 2.5CC GRAFTON PUTTY
Type: IMPLANTABLE DEVICE | Site: SPINE LUMBAR | Status: FUNCTIONAL
Brand: GRAFTON®AND GRAFTON PLUS®DEMINERALIZED BONE MATRIX (DBM)

## 2018-03-30 DEVICE — SCRW ST SOLERA VOYAGER BRKOFF TI 4.75MM: Type: IMPLANTABLE DEVICE | Site: SPINE LUMBAR | Status: FUNCTIONAL

## 2018-03-30 DEVICE — HEMO ABS GELFOAM PWDR PORCN 1GM: Type: IMPLANTABLE DEVICE | Site: SPINE LUMBAR | Status: FUNCTIONAL

## 2018-03-30 DEVICE — HEMO ABS GELFOAM SPNG PORCN SZ100: Type: IMPLANTABLE DEVICE | Site: SPINE LUMBAR | Status: FUNCTIONAL

## 2018-03-30 DEVICE — ASSEMBLY 7968216 M 37X27 16MM 12 DEG CP
Type: IMPLANTABLE DEVICE | Site: SPINE LUMBAR | Status: FUNCTIONAL
Brand: CRESCENT™ SPINAL SYSTEM

## 2018-03-30 RX ORDER — ROCURONIUM BROMIDE 10 MG/ML
INJECTION, SOLUTION INTRAVENOUS AS NEEDED
Status: DISCONTINUED | OUTPATIENT
Start: 2018-03-30 | End: 2018-03-30 | Stop reason: SURG

## 2018-03-30 RX ORDER — SODIUM CHLORIDE, SODIUM LACTATE, POTASSIUM CHLORIDE, CALCIUM CHLORIDE 600; 310; 30; 20 MG/100ML; MG/100ML; MG/100ML; MG/100ML
1000 INJECTION, SOLUTION INTRAVENOUS CONTINUOUS
Status: DISCONTINUED | OUTPATIENT
Start: 2018-03-30 | End: 2018-03-30

## 2018-03-30 RX ORDER — BUPIVACAINE HYDROCHLORIDE AND EPINEPHRINE 2.5; 5 MG/ML; UG/ML
INJECTION, SOLUTION EPIDURAL; INFILTRATION; INTRACAUDAL; PERINEURAL AS NEEDED
Status: DISCONTINUED | OUTPATIENT
Start: 2018-03-30 | End: 2018-03-30 | Stop reason: HOSPADM

## 2018-03-30 RX ORDER — ONDANSETRON 4 MG/1
4 TABLET, FILM COATED ORAL EVERY 6 HOURS PRN
Status: DISCONTINUED | OUTPATIENT
Start: 2018-03-30 | End: 2018-04-03 | Stop reason: HOSPADM

## 2018-03-30 RX ORDER — HYDROMORPHONE HCL 110MG/55ML
0.5 PATIENT CONTROLLED ANALGESIA SYRINGE INTRAVENOUS
Status: DISCONTINUED | OUTPATIENT
Start: 2018-03-30 | End: 2018-04-03 | Stop reason: HOSPADM

## 2018-03-30 RX ORDER — LIDOCAINE HYDROCHLORIDE 20 MG/ML
INJECTION, SOLUTION INFILTRATION; PERINEURAL AS NEEDED
Status: DISCONTINUED | OUTPATIENT
Start: 2018-03-30 | End: 2018-03-30 | Stop reason: SURG

## 2018-03-30 RX ORDER — HYDROXYZINE HYDROCHLORIDE 25 MG/1
25 TABLET, FILM COATED ORAL 3 TIMES DAILY PRN
Status: DISCONTINUED | OUTPATIENT
Start: 2018-03-30 | End: 2018-04-03 | Stop reason: HOSPADM

## 2018-03-30 RX ORDER — SODIUM CHLORIDE 0.9 % (FLUSH) 0.9 %
1-10 SYRINGE (ML) INJECTION AS NEEDED
Status: DISCONTINUED | OUTPATIENT
Start: 2018-03-30 | End: 2018-04-03 | Stop reason: HOSPADM

## 2018-03-30 RX ORDER — MEPERIDINE HYDROCHLORIDE 25 MG/ML
12.5 INJECTION INTRAMUSCULAR; INTRAVENOUS; SUBCUTANEOUS
Status: DISCONTINUED | OUTPATIENT
Start: 2018-03-30 | End: 2018-03-30 | Stop reason: HOSPADM

## 2018-03-30 RX ORDER — SODIUM CHLORIDE, SODIUM LACTATE, POTASSIUM CHLORIDE, CALCIUM CHLORIDE 600; 310; 30; 20 MG/100ML; MG/100ML; MG/100ML; MG/100ML
100 INJECTION, SOLUTION INTRAVENOUS CONTINUOUS
Status: DISCONTINUED | OUTPATIENT
Start: 2018-03-30 | End: 2018-03-30

## 2018-03-30 RX ORDER — MIDAZOLAM HYDROCHLORIDE 1 MG/ML
1 INJECTION INTRAMUSCULAR; INTRAVENOUS
Status: DISCONTINUED | OUTPATIENT
Start: 2018-03-30 | End: 2018-03-30 | Stop reason: HOSPADM

## 2018-03-30 RX ORDER — SODIUM CHLORIDE 0.9 % (FLUSH) 0.9 %
3 SYRINGE (ML) INJECTION AS NEEDED
Status: DISCONTINUED | OUTPATIENT
Start: 2018-03-30 | End: 2018-03-30 | Stop reason: HOSPADM

## 2018-03-30 RX ORDER — SUFENTANIL CITRATE 50 UG/ML
INJECTION EPIDURAL; INTRAVENOUS AS NEEDED
Status: DISCONTINUED | OUTPATIENT
Start: 2018-03-30 | End: 2018-03-30 | Stop reason: SURG

## 2018-03-30 RX ORDER — MORPHINE SULFATE 2 MG/ML
2 INJECTION, SOLUTION INTRAMUSCULAR; INTRAVENOUS AS NEEDED
Status: DISCONTINUED | OUTPATIENT
Start: 2018-03-30 | End: 2018-03-30 | Stop reason: HOSPADM

## 2018-03-30 RX ORDER — SENNA AND DOCUSATE SODIUM 50; 8.6 MG/1; MG/1
2 TABLET, FILM COATED ORAL 2 TIMES DAILY
Status: DISCONTINUED | OUTPATIENT
Start: 2018-03-30 | End: 2018-04-03 | Stop reason: HOSPADM

## 2018-03-30 RX ORDER — MAGNESIUM HYDROXIDE 1200 MG/15ML
LIQUID ORAL AS NEEDED
Status: DISCONTINUED | OUTPATIENT
Start: 2018-03-30 | End: 2018-03-30 | Stop reason: HOSPADM

## 2018-03-30 RX ORDER — SODIUM CHLORIDE AND POTASSIUM CHLORIDE 150; 900 MG/100ML; MG/100ML
100 INJECTION, SOLUTION INTRAVENOUS CONTINUOUS
Status: DISCONTINUED | OUTPATIENT
Start: 2018-03-30 | End: 2018-03-31

## 2018-03-30 RX ORDER — SUCCINYLCHOLINE CHLORIDE 20 MG/ML
INJECTION INTRAMUSCULAR; INTRAVENOUS AS NEEDED
Status: DISCONTINUED | OUTPATIENT
Start: 2018-03-30 | End: 2018-03-30 | Stop reason: SURG

## 2018-03-30 RX ORDER — FLUMAZENIL 0.1 MG/ML
0.2 INJECTION INTRAVENOUS AS NEEDED
Status: DISCONTINUED | OUTPATIENT
Start: 2018-03-30 | End: 2018-03-30 | Stop reason: HOSPADM

## 2018-03-30 RX ORDER — SODIUM CHLORIDE 0.9 % (FLUSH) 0.9 %
1-10 SYRINGE (ML) INJECTION AS NEEDED
Status: DISCONTINUED | OUTPATIENT
Start: 2018-03-30 | End: 2018-03-30 | Stop reason: HOSPADM

## 2018-03-30 RX ORDER — LEVOTHYROXINE SODIUM 0.1 MG/1
100 TABLET ORAL
Status: DISCONTINUED | OUTPATIENT
Start: 2018-03-30 | End: 2018-04-03 | Stop reason: HOSPADM

## 2018-03-30 RX ORDER — ACETAMINOPHEN 325 MG/1
650 TABLET ORAL EVERY 4 HOURS PRN
Status: DISCONTINUED | OUTPATIENT
Start: 2018-03-30 | End: 2018-04-01

## 2018-03-30 RX ORDER — METOCLOPRAMIDE HYDROCHLORIDE 5 MG/ML
5 INJECTION INTRAMUSCULAR; INTRAVENOUS
Status: DISCONTINUED | OUTPATIENT
Start: 2018-03-30 | End: 2018-03-30 | Stop reason: HOSPADM

## 2018-03-30 RX ORDER — NALOXONE HCL 0.4 MG/ML
0.4 VIAL (ML) INJECTION
Status: DISCONTINUED | OUTPATIENT
Start: 2018-03-30 | End: 2018-04-03 | Stop reason: HOSPADM

## 2018-03-30 RX ORDER — DIAZEPAM 5 MG/1
5 TABLET ORAL EVERY 4 HOURS PRN
Status: DISCONTINUED | OUTPATIENT
Start: 2018-03-30 | End: 2018-04-01

## 2018-03-30 RX ORDER — VASOPRESSIN 20 U/ML
INJECTION PARENTERAL AS NEEDED
Status: DISCONTINUED | OUTPATIENT
Start: 2018-03-30 | End: 2018-03-30 | Stop reason: SURG

## 2018-03-30 RX ORDER — DIAZEPAM 5 MG/ML
5 INJECTION, SOLUTION INTRAMUSCULAR; INTRAVENOUS EVERY 4 HOURS PRN
Status: DISCONTINUED | OUTPATIENT
Start: 2018-03-30 | End: 2018-03-30 | Stop reason: SDUPTHER

## 2018-03-30 RX ORDER — MIDAZOLAM HYDROCHLORIDE 1 MG/ML
2 INJECTION INTRAMUSCULAR; INTRAVENOUS
Status: DISCONTINUED | OUTPATIENT
Start: 2018-03-30 | End: 2018-03-30 | Stop reason: HOSPADM

## 2018-03-30 RX ORDER — NALOXONE HCL 0.4 MG/ML
0.04 VIAL (ML) INJECTION AS NEEDED
Status: DISCONTINUED | OUTPATIENT
Start: 2018-03-30 | End: 2018-03-30 | Stop reason: HOSPADM

## 2018-03-30 RX ORDER — SODIUM CHLORIDE, SODIUM LACTATE, POTASSIUM CHLORIDE, CALCIUM CHLORIDE 600; 310; 30; 20 MG/100ML; MG/100ML; MG/100ML; MG/100ML
30 INJECTION, SOLUTION INTRAVENOUS CONTINUOUS
Status: DISCONTINUED | OUTPATIENT
Start: 2018-03-30 | End: 2018-03-30

## 2018-03-30 RX ORDER — HEPARIN SODIUM 5000 [USP'U]/ML
5000 INJECTION, SOLUTION INTRAVENOUS; SUBCUTANEOUS EVERY 12 HOURS SCHEDULED
Status: DISCONTINUED | OUTPATIENT
Start: 2018-03-31 | End: 2018-04-03 | Stop reason: HOSPADM

## 2018-03-30 RX ORDER — IPRATROPIUM BROMIDE AND ALBUTEROL SULFATE 2.5; .5 MG/3ML; MG/3ML
3 SOLUTION RESPIRATORY (INHALATION) ONCE AS NEEDED
Status: DISCONTINUED | OUTPATIENT
Start: 2018-03-30 | End: 2018-03-30 | Stop reason: HOSPADM

## 2018-03-30 RX ORDER — ONDANSETRON 2 MG/ML
4 INJECTION INTRAMUSCULAR; INTRAVENOUS EVERY 6 HOURS PRN
Status: DISCONTINUED | OUTPATIENT
Start: 2018-03-30 | End: 2018-04-03 | Stop reason: HOSPADM

## 2018-03-30 RX ORDER — PROPOFOL 10 MG/ML
VIAL (ML) INTRAVENOUS AS NEEDED
Status: DISCONTINUED | OUTPATIENT
Start: 2018-03-30 | End: 2018-03-30 | Stop reason: SURG

## 2018-03-30 RX ORDER — ONDANSETRON 4 MG/1
4 TABLET, ORALLY DISINTEGRATING ORAL EVERY 6 HOURS PRN
Status: DISCONTINUED | OUTPATIENT
Start: 2018-03-30 | End: 2018-04-03 | Stop reason: HOSPADM

## 2018-03-30 RX ORDER — NALOXONE HCL 0.4 MG/ML
0.1 VIAL (ML) INJECTION
Status: DISCONTINUED | OUTPATIENT
Start: 2018-03-30 | End: 2018-03-31

## 2018-03-30 RX ORDER — ACETAMINOPHEN 325 MG/1
650 TABLET ORAL EVERY 6 HOURS PRN
Status: DISCONTINUED | OUTPATIENT
Start: 2018-03-30 | End: 2018-03-30 | Stop reason: SDUPTHER

## 2018-03-30 RX ORDER — SODIUM CHLORIDE 9 MG/ML
INJECTION, SOLUTION INTRAVENOUS AS NEEDED
Status: DISCONTINUED | OUTPATIENT
Start: 2018-03-30 | End: 2018-03-30 | Stop reason: HOSPADM

## 2018-03-30 RX ORDER — ONDANSETRON 2 MG/ML
4 INJECTION INTRAMUSCULAR; INTRAVENOUS AS NEEDED
Status: DISCONTINUED | OUTPATIENT
Start: 2018-03-30 | End: 2018-03-30 | Stop reason: HOSPADM

## 2018-03-30 RX ORDER — HYDROCODONE BITARTRATE AND ACETAMINOPHEN 7.5; 325 MG/1; MG/1
1 TABLET ORAL EVERY 4 HOURS PRN
Status: DISCONTINUED | OUTPATIENT
Start: 2018-03-30 | End: 2018-03-31

## 2018-03-30 RX ORDER — HYDRALAZINE HYDROCHLORIDE 20 MG/ML
5 INJECTION INTRAMUSCULAR; INTRAVENOUS
Status: DISCONTINUED | OUTPATIENT
Start: 2018-03-30 | End: 2018-03-30 | Stop reason: HOSPADM

## 2018-03-30 RX ORDER — HYDROMORPHONE HCL IN 0.9% NACL 50 MG/50ML
PATIENT CONTROLLED ANALGESIA SYRINGE INTRAVENOUS CONTINUOUS
Status: DISCONTINUED | OUTPATIENT
Start: 2018-03-30 | End: 2018-03-31

## 2018-03-30 RX ORDER — LABETALOL HYDROCHLORIDE 5 MG/ML
5 INJECTION, SOLUTION INTRAVENOUS
Status: DISCONTINUED | OUTPATIENT
Start: 2018-03-30 | End: 2018-03-30 | Stop reason: HOSPADM

## 2018-03-30 RX ADMIN — ONDANSETRON 4 MG: 2 INJECTION INTRAMUSCULAR; INTRAVENOUS at 14:12

## 2018-03-30 RX ADMIN — Medication 2 G: at 11:45

## 2018-03-30 RX ADMIN — SODIUM CHLORIDE, POTASSIUM CHLORIDE, SODIUM LACTATE AND CALCIUM CHLORIDE 1000 ML: 600; 310; 30; 20 INJECTION, SOLUTION INTRAVENOUS at 06:01

## 2018-03-30 RX ADMIN — SUFENTANIL CITRATE 25 MCG: 50 INJECTION, SOLUTION EPIDURAL; INTRAVENOUS at 11:10

## 2018-03-30 RX ADMIN — DOCUSATE SODIUM AND SENNOSIDES 2 TABLET: 8.6; 5 TABLET, FILM COATED ORAL at 21:03

## 2018-03-30 RX ADMIN — VASOPRESSIN 2 UNITS: 20 INJECTION INTRAVENOUS at 07:30

## 2018-03-30 RX ADMIN — MEPERIDINE HYDROCHLORIDE 12.5 MG: 25 INJECTION INTRAMUSCULAR; INTRAVENOUS; SUBCUTANEOUS at 14:16

## 2018-03-30 RX ADMIN — MEPERIDINE HYDROCHLORIDE 12.5 MG: 25 INJECTION INTRAMUSCULAR; INTRAVENOUS; SUBCUTANEOUS at 14:11

## 2018-03-30 RX ADMIN — HYDROCODONE BITARTRATE AND ACETAMINOPHEN 1 TABLET: 7.5; 325 TABLET ORAL at 22:12

## 2018-03-30 RX ADMIN — HYDROMORPHONE HYDROCHLORIDE 1 MG: 1 INJECTION, SOLUTION INTRAMUSCULAR; INTRAVENOUS; SUBCUTANEOUS at 13:52

## 2018-03-30 RX ADMIN — PROPOFOL 200 MG: 10 INJECTION, EMULSION INTRAVENOUS at 07:25

## 2018-03-30 RX ADMIN — HYDROMORPHONE HYDROCHLORIDE: 10 INJECTION, SOLUTION INTRAMUSCULAR; INTRAVENOUS; SUBCUTANEOUS at 17:10

## 2018-03-30 RX ADMIN — SUFENTANIL CITRATE 10 MCG: 50 INJECTION, SOLUTION EPIDURAL; INTRAVENOUS at 07:50

## 2018-03-30 RX ADMIN — ROCURONIUM BROMIDE 30 MG: 10 INJECTION INTRAVENOUS at 09:15

## 2018-03-30 RX ADMIN — MORPHINE SULFATE 2 MG: 2 INJECTION, SOLUTION INTRAMUSCULAR; INTRAVENOUS at 14:32

## 2018-03-30 RX ADMIN — HYDROCODONE BITARTRATE AND ACETAMINOPHEN 1 TABLET: 7.5; 325 TABLET ORAL at 18:18

## 2018-03-30 RX ADMIN — Medication 2 G: at 07:45

## 2018-03-30 RX ADMIN — ROCURONIUM BROMIDE 5 MG: 10 INJECTION INTRAVENOUS at 07:25

## 2018-03-30 RX ADMIN — LIDOCAINE HYDROCHLORIDE 0.5 ML: 10 INJECTION, SOLUTION EPIDURAL; INFILTRATION; INTRACAUDAL; PERINEURAL at 05:55

## 2018-03-30 RX ADMIN — DIAZEPAM 5 MG: 5 TABLET ORAL at 22:12

## 2018-03-30 RX ADMIN — ROCURONIUM BROMIDE 25 MG: 10 INJECTION INTRAVENOUS at 07:32

## 2018-03-30 RX ADMIN — LEVOTHYROXINE SODIUM 100 MCG: 100 TABLET ORAL at 16:48

## 2018-03-30 RX ADMIN — SERTRALINE 150 MG: 100 TABLET, FILM COATED ORAL at 16:48

## 2018-03-30 RX ADMIN — SODIUM CHLORIDE, POTASSIUM CHLORIDE, SODIUM LACTATE AND CALCIUM CHLORIDE 30 ML/HR: 600; 310; 30; 20 INJECTION, SOLUTION INTRAVENOUS at 05:51

## 2018-03-30 RX ADMIN — MIDAZOLAM HYDROCHLORIDE 2 MG: 1 INJECTION, SOLUTION INTRAMUSCULAR; INTRAVENOUS at 06:45

## 2018-03-30 RX ADMIN — SUCCINYLCHOLINE CHLORIDE 100 MG: 20 INJECTION, SOLUTION INTRAMUSCULAR; INTRAVENOUS at 07:25

## 2018-03-30 RX ADMIN — EPHEDRINE SULFATE 20 MG: 50 INJECTION INTRAMUSCULAR; INTRAVENOUS; SUBCUTANEOUS at 07:55

## 2018-03-30 RX ADMIN — EPHEDRINE SULFATE 10 MG: 50 INJECTION INTRAMUSCULAR; INTRAVENOUS; SUBCUTANEOUS at 08:00

## 2018-03-30 RX ADMIN — HYDROMORPHONE HYDROCHLORIDE 0.5 MG: 1 INJECTION, SOLUTION INTRAMUSCULAR; INTRAVENOUS; SUBCUTANEOUS at 14:09

## 2018-03-30 RX ADMIN — HYDROMORPHONE HYDROCHLORIDE 0.5 MG: 1 INJECTION, SOLUTION INTRAMUSCULAR; INTRAVENOUS; SUBCUTANEOUS at 14:14

## 2018-03-30 RX ADMIN — VASOPRESSIN 2 UNITS: 20 INJECTION INTRAVENOUS at 08:45

## 2018-03-30 RX ADMIN — EPHEDRINE SULFATE 20 MG: 50 INJECTION INTRAMUSCULAR; INTRAVENOUS; SUBCUTANEOUS at 08:35

## 2018-03-30 RX ADMIN — PROPOFOL 100 MCG/KG/MIN: 10 INJECTION, EMULSION INTRAVENOUS at 07:35

## 2018-03-30 RX ADMIN — LIDOCAINE HYDROCHLORIDE 60 MG: 20 INJECTION, SOLUTION INFILTRATION; PERINEURAL at 07:25

## 2018-03-30 RX ADMIN — SUFENTANIL CITRATE 20 MCG: 50 INJECTION, SOLUTION EPIDURAL; INTRAVENOUS at 07:32

## 2018-03-30 RX ADMIN — SUFENTANIL CITRATE 20 MCG: 50 INJECTION, SOLUTION EPIDURAL; INTRAVENOUS at 07:25

## 2018-03-30 RX ADMIN — VASOPRESSIN 2 UNITS: 20 INJECTION INTRAVENOUS at 07:35

## 2018-03-30 RX ADMIN — CEFAZOLIN 2 G: 1 INJECTION, POWDER, FOR SOLUTION INTRAVENOUS at 19:54

## 2018-03-30 NOTE — ANESTHESIA PREPROCEDURE EVALUATION
Anesthesia Evaluation     Patient summary reviewed   no history of anesthetic complications:  NPO Solid Status: > 8 hours  NPO Liquid Status: > 8 hours           Airway   Mallampati: III  TM distance: >3 FB  Neck ROM: full  Dental - normal exam   (+) poor dentition        Pulmonary - normal exam    breath sounds clear to auscultation  (-) asthma, recent URI, sleep apnea, not a smoker  Cardiovascular - normal exam  Exercise tolerance: good (4-7 METS)    ECG reviewed  Rhythm: regular  Rate: normal    (-) pacemaker, hypertension, past MI, angina, cardiac stents, CABG      Neuro/Psych  (+) numbness,     (-) seizures, TIA, CVA  GI/Hepatic/Renal/Endo    (+)  GERD well controlled,  renal disease stones, hypothyroidism,   (-) liver disease, diabetes, hyperthyroidism    Musculoskeletal     Abdominal    Substance History      OB/GYN          Other                        Anesthesia Plan    ASA 2     general     intravenous induction   Anesthetic plan and risks discussed with patient.  Use of blood products discussed with patient  Consented to blood products.

## 2018-03-30 NOTE — ANESTHESIA POSTPROCEDURE EVALUATION
"Patient: Eric Caldera    Procedure Summary     Date:  03/30/18 Room / Location:   [facility]  /  [facility]    Anesthesia Start:  0724 Anesthesia Stop:  1332    Procedures:       LUMBAR LAMINECTOMY ANTERIOR LUMBAR INTERBODY FUSION, Lumbar 5 to sacral 1 anterior interbody fusion and then posterior MIS L5 to S1 left decompression and instrumentation. O-arm (Left Spine Cervical)      ANTERIOR LUMBAR EXPOSURE (N/A Abdomen) Diagnosis:       Lumbar disc herniation with radiculopathy      (Lumbar disc herniation with radiculopathy [M51.16])    Surgeon:  Mehran Almaraz MD; Samm Robb DO Provider:  Roderick Billingsley CRNA    Anesthesia Type:  general ASA Status:  2          Anesthesia Type: general  Last vitals  BP   127/63 (03/30/18 1620)   Temp   98.2 °F (36.8 °C) (03/30/18 1620)   Pulse   90 (03/30/18 1620)   Resp   16 (03/30/18 1620)     SpO2   98 % (03/30/18 1620)     Post Anesthesia Care and Evaluation    Patient location during evaluation: PACU  Patient participation: complete - patient participated  Level of consciousness: awake and alert  Pain management: adequate  Airway patency: patent  Anesthetic complications: No anesthetic complications    Cardiovascular status: acceptable  Respiratory status: acceptable  Hydration status: acceptable    Comments: Blood pressure 127/63, pulse 90, temperature 98.2 °F (36.8 °C), temperature source Oral, resp. rate 16, height 176.5 cm (69.49\"), weight 88.5 kg (195 lb 1.7 oz), SpO2 98 %.    Pt discharged from PACU based on alpesh score >8      "

## 2018-03-30 NOTE — ANESTHESIA PROCEDURE NOTES
Arterial Line    Patient location during procedure: OR   Line placed for hemodynamic monitoring, ABGs/Labs/ISTAT and MD/Surgeon request.  Performed By   CRNA: DIANA MARIN  Preanesthetic Checklist  Completed: patient identified, site marked, surgical consent, pre-op evaluation, timeout performed, IV checked, risks and benefits discussed and monitors and equipment checked  Arterial Line Prep   Sterile Tech: gloves and gown  Prep: alcohol swabs  Arterial Line Procedure   Laterality:right  Location:  radial artery  Catheter size: 20 G   Guidance: palpation technique  Number of attempts: 1  Successful placement: yes          Post Assessment   Dressing Type: secured with tape.   Complications no  Circ/Move/Sens Assessment: normal.   Patient Tolerance: patient tolerated the procedure well with no apparent complications

## 2018-03-30 NOTE — ANESTHESIA PROCEDURE NOTES
Airway  Urgency: elective    Airway not difficult    General Information and Staff    Patient location during procedure: OB  CRNA: BRIDGET GUNTER    Indications and Patient Condition  Indications for airway management: airway protection    Preoxygenated: yes  Mask difficulty assessment: 1 - vent by mask    Final Airway Details  Final airway type: endotracheal airway      Successful airway: ETT    Successful intubation technique: direct laryngoscopy  Endotracheal tube insertion site: oral  Blade: Marcelina  Blade size: 3.5.  ETT size: 8.0 mm  Cormack-Lehane Classification: grade I - full view of glottis  Placement verified by: chest auscultation and capnometry   Measured from: lips  ETT to lips (cm): 22  Number of attempts at approach: 1

## 2018-03-31 PROCEDURE — G8978 MOBILITY CURRENT STATUS: HCPCS | Performed by: PHYSICAL THERAPIST

## 2018-03-31 PROCEDURE — 99024 POSTOP FOLLOW-UP VISIT: CPT | Performed by: NEUROLOGICAL SURGERY

## 2018-03-31 PROCEDURE — 97161 PT EVAL LOW COMPLEX 20 MIN: CPT | Performed by: PHYSICAL THERAPIST

## 2018-03-31 PROCEDURE — 25810000003 SODIUM CHLORIDE 0.9 % WITH KCL 20 MEQ 20-0.9 MEQ/L-% SOLUTION: Performed by: NEUROLOGICAL SURGERY

## 2018-03-31 PROCEDURE — 97116 GAIT TRAINING THERAPY: CPT | Performed by: PHYSICAL THERAPIST

## 2018-03-31 PROCEDURE — 25010000002 HEPARIN (PORCINE) PER 1000 UNITS: Performed by: NEUROLOGICAL SURGERY

## 2018-03-31 PROCEDURE — 25010000002 HYDROMORPHONE PER 4 MG: Performed by: NEUROLOGICAL SURGERY

## 2018-03-31 PROCEDURE — 25010000003 CEFAZOLIN PER 500 MG: Performed by: NEUROLOGICAL SURGERY

## 2018-03-31 PROCEDURE — G8987 SELF CARE CURRENT STATUS: HCPCS

## 2018-03-31 PROCEDURE — 25010000002 ONDANSETRON PER 1 MG: Performed by: NEUROLOGICAL SURGERY

## 2018-03-31 PROCEDURE — G8979 MOBILITY GOAL STATUS: HCPCS | Performed by: PHYSICAL THERAPIST

## 2018-03-31 PROCEDURE — G8988 SELF CARE GOAL STATUS: HCPCS

## 2018-03-31 PROCEDURE — 97165 OT EVAL LOW COMPLEX 30 MIN: CPT

## 2018-03-31 RX ORDER — OXYCODONE HYDROCHLORIDE AND ACETAMINOPHEN 5; 325 MG/1; MG/1
1 TABLET ORAL EVERY 4 HOURS PRN
Status: DISCONTINUED | OUTPATIENT
Start: 2018-03-31 | End: 2018-04-01

## 2018-03-31 RX ORDER — OXYCODONE AND ACETAMINOPHEN 10; 325 MG/1; MG/1
1 TABLET ORAL EVERY 4 HOURS PRN
Status: DISCONTINUED | OUTPATIENT
Start: 2018-03-31 | End: 2018-04-01

## 2018-03-31 RX ADMIN — CEFAZOLIN 2 G: 1 INJECTION, POWDER, FOR SOLUTION INTRAVENOUS at 04:03

## 2018-03-31 RX ADMIN — DOCUSATE SODIUM AND SENNOSIDES 2 TABLET: 8.6; 5 TABLET, FILM COATED ORAL at 21:07

## 2018-03-31 RX ADMIN — HYDROMORPHONE HYDROCHLORIDE 0.5 MG: 2 INJECTION, SOLUTION INTRAMUSCULAR; INTRAVENOUS; SUBCUTANEOUS at 15:20

## 2018-03-31 RX ADMIN — OXYCODONE HYDROCHLORIDE AND ACETAMINOPHEN 1 TABLET: 5; 325 TABLET ORAL at 19:11

## 2018-03-31 RX ADMIN — OXYCODONE HYDROCHLORIDE AND ACETAMINOPHEN 1 TABLET: 5; 325 TABLET ORAL at 10:26

## 2018-03-31 RX ADMIN — HYDROCODONE BITARTRATE AND ACETAMINOPHEN 1 TABLET: 7.5; 325 TABLET ORAL at 02:31

## 2018-03-31 RX ADMIN — POTASSIUM CHLORIDE AND SODIUM CHLORIDE 100 ML/HR: 900; 150 INJECTION, SOLUTION INTRAVENOUS at 03:05

## 2018-03-31 RX ADMIN — DIAZEPAM 5 MG: 5 TABLET ORAL at 02:31

## 2018-03-31 RX ADMIN — OXYCODONE HYDROCHLORIDE AND ACETAMINOPHEN 1 TABLET: 10; 325 TABLET ORAL at 21:06

## 2018-03-31 RX ADMIN — OXYCODONE HYDROCHLORIDE AND ACETAMINOPHEN 1 TABLET: 5; 325 TABLET ORAL at 14:27

## 2018-03-31 RX ADMIN — HYDROMORPHONE HYDROCHLORIDE 0.5 MG: 2 INJECTION, SOLUTION INTRAMUSCULAR; INTRAVENOUS; SUBCUTANEOUS at 19:11

## 2018-03-31 RX ADMIN — DIAZEPAM 5 MG: 5 TABLET ORAL at 15:19

## 2018-03-31 RX ADMIN — HEPARIN SODIUM 5000 UNITS: 5000 INJECTION INTRAVENOUS; SUBCUTANEOUS at 21:06

## 2018-03-31 RX ADMIN — LEVOTHYROXINE SODIUM 100 MCG: 100 TABLET ORAL at 06:11

## 2018-03-31 RX ADMIN — HYDROMORPHONE HYDROCHLORIDE 0.5 MG: 2 INJECTION, SOLUTION INTRAMUSCULAR; INTRAVENOUS; SUBCUTANEOUS at 21:52

## 2018-03-31 RX ADMIN — DIAZEPAM 5 MG: 5 TABLET ORAL at 19:11

## 2018-03-31 RX ADMIN — SERTRALINE 150 MG: 100 TABLET, FILM COATED ORAL at 08:56

## 2018-03-31 RX ADMIN — HEPARIN SODIUM 5000 UNITS: 5000 INJECTION INTRAVENOUS; SUBCUTANEOUS at 08:56

## 2018-03-31 RX ADMIN — ONDANSETRON 4 MG: 2 INJECTION INTRAMUSCULAR; INTRAVENOUS at 04:31

## 2018-03-31 RX ADMIN — ACETAMINOPHEN 650 MG: 325 TABLET ORAL at 21:06

## 2018-03-31 RX ADMIN — DIAZEPAM 5 MG: 5 TABLET ORAL at 10:26

## 2018-03-31 RX ADMIN — DOCUSATE SODIUM AND SENNOSIDES 2 TABLET: 8.6; 5 TABLET, FILM COATED ORAL at 08:57

## 2018-04-01 PROCEDURE — 25010000002 HEPARIN (PORCINE) PER 1000 UNITS: Performed by: NEUROLOGICAL SURGERY

## 2018-04-01 PROCEDURE — 97116 GAIT TRAINING THERAPY: CPT

## 2018-04-01 PROCEDURE — 25010000002 HYDROMORPHONE PER 4 MG: Performed by: NEUROLOGICAL SURGERY

## 2018-04-01 PROCEDURE — 99024 POSTOP FOLLOW-UP VISIT: CPT | Performed by: NEUROLOGICAL SURGERY

## 2018-04-01 RX ORDER — GABAPENTIN 100 MG/1
100 CAPSULE ORAL EVERY 8 HOURS SCHEDULED
Status: DISCONTINUED | OUTPATIENT
Start: 2018-04-01 | End: 2018-04-03 | Stop reason: HOSPADM

## 2018-04-01 RX ORDER — DIAZEPAM 5 MG/1
5 TABLET ORAL EVERY 8 HOURS SCHEDULED
Status: DISCONTINUED | OUTPATIENT
Start: 2018-04-01 | End: 2018-04-03 | Stop reason: HOSPADM

## 2018-04-01 RX ORDER — OXYCODONE HYDROCHLORIDE 5 MG/1
15 TABLET ORAL EVERY 4 HOURS PRN
Status: DISCONTINUED | OUTPATIENT
Start: 2018-04-01 | End: 2018-04-03 | Stop reason: HOSPADM

## 2018-04-01 RX ORDER — ACETAMINOPHEN 325 MG/1
650 TABLET ORAL
Status: DISCONTINUED | OUTPATIENT
Start: 2018-04-01 | End: 2018-04-03 | Stop reason: HOSPADM

## 2018-04-01 RX ADMIN — DIAZEPAM 5 MG: 5 TABLET ORAL at 21:08

## 2018-04-01 RX ADMIN — OXYCODONE HYDROCHLORIDE AND ACETAMINOPHEN 1 TABLET: 10; 325 TABLET ORAL at 05:29

## 2018-04-01 RX ADMIN — DIAZEPAM 5 MG: 5 TABLET ORAL at 04:35

## 2018-04-01 RX ADMIN — SERTRALINE 150 MG: 100 TABLET, FILM COATED ORAL at 08:05

## 2018-04-01 RX ADMIN — HYDROMORPHONE HYDROCHLORIDE 0.5 MG: 2 INJECTION, SOLUTION INTRAMUSCULAR; INTRAVENOUS; SUBCUTANEOUS at 00:12

## 2018-04-01 RX ADMIN — LEVOTHYROXINE SODIUM 100 MCG: 100 TABLET ORAL at 05:29

## 2018-04-01 RX ADMIN — HEPARIN SODIUM 5000 UNITS: 5000 INJECTION INTRAVENOUS; SUBCUTANEOUS at 08:05

## 2018-04-01 RX ADMIN — OXYCODONE HYDROCHLORIDE 15 MG: 5 TABLET ORAL at 18:45

## 2018-04-01 RX ADMIN — OXYCODONE HYDROCHLORIDE AND ACETAMINOPHEN 1 TABLET: 10; 325 TABLET ORAL at 08:56

## 2018-04-01 RX ADMIN — DIAZEPAM 5 MG: 5 TABLET ORAL at 00:11

## 2018-04-01 RX ADMIN — OXYCODONE HYDROCHLORIDE AND ACETAMINOPHEN 1 TABLET: 10; 325 TABLET ORAL at 01:22

## 2018-04-01 RX ADMIN — ACETAMINOPHEN 650 MG: 325 TABLET, FILM COATED ORAL at 21:08

## 2018-04-01 RX ADMIN — DOCUSATE SODIUM AND SENNOSIDES 2 TABLET: 8.6; 5 TABLET, FILM COATED ORAL at 08:05

## 2018-04-01 RX ADMIN — GABAPENTIN 100 MG: 100 CAPSULE ORAL at 13:36

## 2018-04-01 RX ADMIN — OXYCODONE HYDROCHLORIDE 15 MG: 5 TABLET ORAL at 14:58

## 2018-04-01 RX ADMIN — DIAZEPAM 5 MG: 5 TABLET ORAL at 08:56

## 2018-04-01 RX ADMIN — DIAZEPAM 5 MG: 5 TABLET ORAL at 13:36

## 2018-04-01 RX ADMIN — HYDROMORPHONE HYDROCHLORIDE 0.5 MG: 2 INJECTION, SOLUTION INTRAMUSCULAR; INTRAVENOUS; SUBCUTANEOUS at 04:35

## 2018-04-01 RX ADMIN — ACETAMINOPHEN 650 MG: 325 TABLET, FILM COATED ORAL at 11:59

## 2018-04-01 RX ADMIN — OXYCODONE HYDROCHLORIDE 15 MG: 5 TABLET ORAL at 23:31

## 2018-04-01 RX ADMIN — HEPARIN SODIUM 5000 UNITS: 5000 INJECTION INTRAVENOUS; SUBCUTANEOUS at 21:08

## 2018-04-01 RX ADMIN — GABAPENTIN 100 MG: 100 CAPSULE ORAL at 21:08

## 2018-04-01 RX ADMIN — DOCUSATE SODIUM AND SENNOSIDES 2 TABLET: 8.6; 5 TABLET, FILM COATED ORAL at 21:08

## 2018-04-01 NOTE — PLAN OF CARE
Problem: Patient Care Overview  Goal: Plan of Care Review  Outcome: Ongoing (interventions implemented as appropriate)   04/01/18 0944   Coping/Psychosocial   Plan of Care Reviewed With patient   Plan of Care Review   Progress improving   OTHER   Outcome Summary PT tx completed. Pt sitting edge of bed. C/O low back pn rates 10/10. Shaking he was hurting so bad. Transfers CG, amb 95' with MinCG assist due to safety. Legs trembling with pn. S bed mobility but cueing for technique.

## 2018-04-01 NOTE — THERAPY TREATMENT NOTE
Acute Care - Physical Therapy Treatment Note  [facility]     Patient Name: Eric Caldera  : 1960  MRN: [id number]  Today's Date: 2018  Onset of Illness/Injury or Date of Surgery: 18  Date of Referral to PT: 18  Referring Physician: Dr. Almaraz    Admit Date: 3/30/2018    Visit Dx:    ICD-10-CM ICD-9-CM   1. Lumbar disc herniation with radiculopathy M51.16 722.10   2. Decreased activities of daily living (ADL) Z78.9 V49.89     Patient Active Problem List   Diagnosis   • Degeneration of lumbar intervertebral disc   • Pain in both lower extremities   • Numbness in feet   • Claudication of both lower extremities   • BMI 28.0-28.9,adult   • Tobacco non-user   • Lumbar disc herniation with radiculopathy       Therapy Treatment    Therapy Treatment / Health Promotion    Treatment Time/Intention  Discipline: physical therapy assistant (18 0955 : Ximena Delcid PTA)  Document Type: therapy note (daily note) (18 0955 : Ximena Delcid PTA)  Subjective Information: complains of, pain (18 0955 : Ximena Delcid PTA)  Mode of Treatment: physical therapy (18 0955 : Ximena Delcid PTA)  Existing Precautions/Restrictions: brace worn when out of bed, fall, spinal (18 0955 : Ximena Delcid PTA)  Treatment Considerations/Comments: pn meds prior to tx (18 0955 : Ximena Delcid PTA)  Plan of Care Review  Plan of Care Reviewed With: patient (18 0955 : Ximena Delcid PTA)    Vitals/Pain/Safety  Pain Scale: Numbers Pre/Post-Treatment  Pain Scale: Numbers, Pretreatment: 10/10 (18 0955 : Ximena Delcid PTA)  Pain Location: back (18 0955 : Ximena Delcid PTA)  Pain Intervention(s): Ambulation/increased activity (18 0955 : Ximena Delcid PTA)  Pain Scale: Word Pre/Post-Treatment  Pain Location: back (18 0955 : Ximena Delcid PTA)  Pain Intervention(s): Ambulation/increased activity (18 0955 : Ximena Delcid, PTA)  Pain Scale: FACES  Pre/Post-Treatment  Pain Location: back (04/01/18 0955 : Ximena Delcid PTA)  Pain Intervention(s): Ambulation/increased activity (04/01/18 0955 : Ximena Delcid PTA)  Positioning and Restraints  Pre-Treatment Position: in bed (04/01/18 0955 : Ximena Delcid PTA)  Post Treatment Position: bed (04/01/18 0955 : Ximena Delcid PTA)    Mobility,ADL,Motor, Modality  Bed Mobility Assessment/Treatment  Sit-Sidelying [place] (Bed Mobility): verbal cues, contact guard (04/01/18 0955 : Ximena Delcid PTA)  Comment (Bed Mobility): patient was sitting edge of bed  (04/01/18 0955 : Ximena Delcid PTA)  Sit-Stand Transfer  Sit-Stand [place] (Transfers): verbal cues, minimum assist (75% patient effort) (04/01/18 0955 : Ximena Delcid PTA)  Stand-Sit Transfer  Stand-Sit [place] (Transfers): verbal cues, contact guard (04/01/18 0955 : Ximena Delcid PTA)  Gait/Stairs Assessment/Training  Gait/Stairs Assessment/Training: gait/ambulation independence (04/01/18 0955 : Ximena Delcid PTA)  [place] Level (Gait): verbal cues, contact guard, minimum assist (75% patient effort) (04/01/18 0955 : Ximena Delcid PTA)  Distance in Feet (Gait): 95 (04/01/18 0955 : Ximena Delcid PTA)  Deviations/Abnormal Patterns (Gait): antalgic, gait speed decreased, stride length decreased, jaysno decreased (04/01/18 0955 : Ximena Delcid PTA)                 ROM/MMT             Sensory, Edema, Orthotics     Spinal Orthosis Management  Type (Spinal Orthosis): LSO (lumbar sacral orthosis) (04/01/18 0955 : Ximena Delcid PTA)  Wearing Schedule (Spinal Orthosis): wear when out of bed only (04/01/18 0955 : Ximena Delcid PTA)    Cognition, Communication, Swallow       Outcome Summary               PT Rehab Goals     Row Name 03/31/18 1500 03/31/18 0330          Bed Mobility Goal 1 (PT)    Activity/Assistive Device (Bed Mobility Goal 1, PT)  -- bed mobility activities, all  -TB     [place] Level/Cues Needed (Bed Mobility  Goal 1, PT)  -- independent  -TB     Time Frame (Bed Mobility Goal 1, PT)  -- by discharge  -TB     Barriers (Bed Mobility Goal 1, PT)  -- pain  -TB     Progress/Outcomes (Bed Mobility Goal 1, PT)  -- good progress toward goal  -TB        Transfer Goal 1 (PT)    Activity/Assistive Device (Transfer Goal 1, PT)  -- transfers, all  -TB     [place] Level/Cues Needed (Transfer Goal 1, PT)  -- supervision required  -TB     Time Frame (Transfer Goal 1, PT)  -- by discharge  -TB     Barriers (Transfers Goal 1, PT)  -- pain  -TB     Progress/Outcome (Transfer Goal 1, PT)  -- good progress toward goal  -TB        Gait Training Goal 1 (PT)    Activity/Assistive Device (Gait Training Goal 1, PT) gait (walking locomotion)  -TB gait (walking locomotion)  -TB     [place] Level (Gait Training Goal 1, PT) supervision required  -TB supervision required  -TB     Distance (Gait Goal 1, PT) 200  -  -TB     Time Frame (Gait Training Goal 1, PT) by discharge  -TB by discharge  -TB     Barriers (Gait Training Goal 1, PT) pain  -TB pain  -TB     Progress/Outcome (Gait Training Goal 1, PT) good progress toward goal  -TB good progress toward goal  -TB        Patient Education Goal (PT)    Activity (Patient Education Goal, PT)  -- Independent with don/doff/use of LSO  -TB     [place]/Cues/Accuracy (Memory Goal 2, PT)  -- demonstrates adequately  -TB     Time Frame (Patient Education Goal, PT)  -- by discharge  -TB     Barriers (Patient Education Goal, PT)  -- pain  -TB     Progress/Outcome (Patient Education Goal, PT)  -- good progress toward goal  -TB       User Key  (r) = Recorded By, (t) = Taken By, (c) = Cosigned By    Initials Name Provider Type    TB Dilip Avila, PT Physical Therapist          Physical Therapy Education     Title: PT OT SLP Therapies (Active)     Topic: Physical Therapy (Active)     Point: Mobility training (Active)    Learning Progress Summary     Learner Status Readiness Method Response Comment  Documented by    Patient Active Acceptance E NR log rolling, don/doffing LSO KJ 04/01/18 1030     Active Acceptance E NR  TB 03/31/18 0912          Point: Home exercise program (Active)    Learning Progress Summary     Learner Status Readiness Method Response Comment Documented by    Patient Active Acceptance E NR log rolling, don/doffing LSO KJ 04/01/18 1030     Active Acceptance E NR  TB 03/31/18 0912          Point: Body mechanics (Active)    Learning Progress Summary     Learner Status Readiness Method Response Comment Documented by    Patient Active Acceptance E NR log rolling, don/doffing LSO KJ 04/01/18 1030     Active Acceptance E NR  TB 03/31/18 0912          Point: Precautions (Active)    Learning Progress Summary     Learner Status Readiness Method Response Comment Documented by    Patient Active Acceptance E NR log rolling, don/doffing LSO KJ 04/01/18 1030     Active Acceptance E NR  TB 03/31/18 0912                      User Key     Initials Effective Dates Name Provider Type Discipline     08/02/16 -  Dilip Avila, PT Physical Therapist PT     08/02/16 -  Ximena Delcid, PTA Physical Therapy Assistant PT                    PT Recommendation and Plan     Plan of Care Reviewed With: patient  Progress: improving  Outcome Summary: PT tx completed. Pt sitting edge of bed. C/O low back pn rates 10/10. Shaking he was hurting so bad. Transfers CG, amb 95' with MinCG assist due to safety. Legs trembling with pn. S bed mobility but cueing for technique.           Outcome Measures     Row Name 03/31/18 1500 03/31/18 0915 03/31/18 0830       How much help from another person do you currently need...    Turning from your back to your side while in flat bed without using bedrails? 3  -TB  -- 3  -TB    Moving from lying on back to sitting on the side of a flat bed without bedrails? 3  -TB  -- 3  -TB    Moving to and from a bed to a chair (including a wheelchair)? 3  -TB  -- 3  -TB    Standing up from a chair  using your arms (e.g., wheelchair, bedside chair)? 3  -TB  -- 3  -TB    Climbing 3-5 steps with a railing? 3  -TB  -- 3  -TB    To walk in hospital room? 3  -TB  -- 3  -TB    AM-PAC 6 Clicks Score 18  -TB  -- 18  -TB       How much help from another is currently needed...    Putting on and taking off regular lower body clothing?  -- 2  -AC  --    Bathing (including washing, rinsing, and drying)  -- 2  -AC  --    Toileting (which includes using toilet bed pan or urinal)  -- 3  -AC  --    Putting on and taking off regular upper body clothing  -- 2  -AC  --    Taking care of personal grooming (such as brushing teeth)  -- 3  -AC  --    Eating meals  -- 4  -AC  --    Score  -- 16  -AC  --       Functional Assessment    Outcome Measure Options  -- AM-PAC 6 Clicks Daily Activity (OT)  -AC AM-PAC 6 Clicks Basic Mobility (PT)  -TB      User Key  (r) = Recorded By, (t) = Taken By, (c) = Cosigned By    Initials Name Provider Type    TB Dilip Avila, PT Physical Therapist    AC Ariel Villarreal, OTR/L Occupational Therapist           Time Calculation:           PT G-Codes  PT Professional Judgement Used?: Yes  Outcome Measure Options: AM-PAC 6 Clicks Daily Activity (OT)  Score: 18  Functional Limitation: Mobility: Walking and moving around  Mobility: Walking and Moving Around Current Status (): At least 40 percent but less than 60 percent impaired, limited or restricted  Mobility: Walking and Moving Around Goal Status (): At least 20 percent but less than 40 percent impaired, limited or restricted    Ximena Delcid, PTA  4/1/2018

## 2018-04-01 NOTE — PLAN OF CARE
Problem: Patient Care Overview  Goal: Plan of Care Review  Outcome: Ongoing (interventions implemented as appropriate)   04/01/18 0233   Coping/Psychosocial   Plan of Care Reviewed With patient   Plan of Care Review   Progress no change   OTHER   Outcome Summary pt continues to struggle with pain control,  was called and some medication was adjusted. Incisions are C/D/I. no neurovascular changes, pt notes a large improvement of numbness and tingling. Safety maintained. VSS.       Problem: Pain, Acute (Adult)  Goal: Identify Related Risk Factors and Signs and Symptoms  Outcome: Outcome(s) achieved Date Met: 04/01/18    Goal: Acceptable Pain Control/Comfort Level  Outcome: Outcome(s) achieved Date Met: 04/01/18      Problem: Laminectomy/Foraminotomy/Discectomy (Adult)  Goal: Signs and Symptoms of Listed Potential Problems Will be Absent, Minimized or Managed (Laminectomy/Foraminotomy/Discectomy)  Outcome: Ongoing (interventions implemented as appropriate)    Goal: Anesthesia/Sedation Recovery  Outcome: Outcome(s) achieved Date Met: 04/01/18      Problem: Fall Risk (Adult)  Goal: Identify Related Risk Factors and Signs and Symptoms  Outcome: Outcome(s) achieved Date Met: 04/01/18    Goal: Absence of Fall  Outcome: Ongoing (interventions implemented as appropriate)

## 2018-04-01 NOTE — PROGRESS NOTES
"Neurosurgery Daily Progress Note    Assessment:   Eric Caldera is a 57 y.o. yo with a significant PMH of htn and depression who presented with Nhan S1 and left L5 radiculopathy.  Imaging revealed recurrent L5/S1 disc herniation.      DDX:   Patient Active Problem List   Diagnosis   • Degeneration of lumbar intervertebral disc   • Pain in both lower extremities   • Numbness in feet   • Claudication of both lower extremities   • BMI 28.0-28.9,adult   • Tobacco non-user   • Lumbar disc herniation with radiculopathy       Plan:   Neuro: POD#1 from L5/S1 ALIF and Posterior instrumentation and left L5 hemilaminectomy  CV: DOREEN  Pulm: RA  :  Davis out  FEN: Shannan PO, HLIV  GI: Awaiting BM  ID: Post op Abx for 23 hours  Heme:  DVT prophylaxis begins today  Pain: Gabapentin, valium scheduled.  Increase oxycodone to 15 and schedule tylenol  Dispo: PT/OT, anticipate 4-5 days due to home situation      Chief complaint:   Back pain    Subjective  Complete resolution of leg symptoms.     Temp:  [97.4 °F (36.3 °C)-100 °F (37.8 °C)] 98.5 °F (36.9 °C)  Heart Rate:  [83-98] 87  Resp:  [18-20] 18  BP: (105-126)/(52-71) 126/67    Objective:  Vital signs: (most recent): Blood pressure 126/67, pulse 87, temperature 98.5 °F (36.9 °C), temperature source Oral, resp. rate 18, height 176.5 cm (69.49\"), weight 88.5 kg (195 lb 1.7 oz), SpO2 95 %.        Neurologic Exam     Mental Status   Oriented to person, place, and time.   Speech: speech is normal   Level of consciousness: alert    Cranial Nerves     CN III, IV, VI   Pupils are equal, round, and reactive to light.  Extraocular motions are normal.     CN V   Facial sensation intact.     CN VII   Facial expression full, symmetric.     Motor Exam   Right arm pronator drift: absent  Left arm pronator drift: absent    Strength   Right deltoid: 5/5  Left deltoid: 5/5  Right biceps: 5/5  Left biceps: 5/5  Right triceps: 5/5  Left triceps: 5/5  Right iliopsoas: 5/5  Left iliopsoas: 5/5  Right " quadriceps: 5/5  Left quadriceps: 5/5  Right gastroc: 5/5  Left gastroc: 5/5    Sensory Exam   Light touch normal.     Incision CDI    Drains:   [REMOVED] Urethral Catheter Non-latex;Silicone;Double-lumen 16 Fr. (Removed)   Removed 03/30/18 2000   Daily Indications Selected surgeries ( tract, abdomen) 3/30/2018  3:20 PM   Site Assessment Clean;Skin intact 3/30/2018  3:20 PM   Collection Container Standard drainage bag 3/30/2018  3:20 PM   Securement Method Securing device 3/30/2018  3:20 PM   Output (mL) 1200 mL 3/30/2018  7:54 PM       Active Problems:    Lumbar disc herniation with radiculopathy      Lab Results (last 24 hours)     ** No results found for the last 24 hours. **          [id number]  Mehran Almaraz MD

## 2018-04-01 NOTE — PAYOR COMM NOTE
"  [id number]  3/31 CLINICAL UPDATE  UR PHONE    [phone]  Yeimy Han (57 y.o. Male)     Date of Birth Social Security Number Address Home Phone MRN    1960  [address] [phone] [id number]    Sabianist Marital Status          None        Admission Date Admission Type Admitting Provider Attending Provider Department, Room/Bed    3/30/18 Elective Mehran Almaraz MD [place], William Lee, MD [facility] 3A, 351/1    Discharge Date Discharge Disposition Discharge Destination                       Attending Provider:  Mehran Almaraz MD    Allergies:  Naproxen, Nsaids, Prednisone    Isolation:  None   Infection:  None   Code Status:  FULL    Ht:  176.5 cm (69.49\")   Wt:  88.5 kg (195 lb 1.7 oz)    Admission Cmt:  None   Principal Problem:  None                Active Insurance as of 3/30/2018     Primary Coverage     Payor Plan Insurance Group Employer/Plan Group    [employer] [place] [employer] [place]      Payor Plan Address Payor Plan Phone Number Effective From Effective To    [address] [address] [phone] 1/25/2018     [place] [address]       Subscriber Name Subscriber Birth Date Member ID       YEIMY HAN 1960 [id number]                 Emergency Contacts      (Rel.) Home Phone Work Phone Mobile Phone    Solo Han (Son) -- -- [phone]                  ICU Vital Signs     Row Name 04/01/18 1500 04/01/18 1240 04/01/18 0810 04/01/18 0804 04/01/18 0341       Vitals    Temp 98.2 °F (36.8 °C) 99.2 °F (37.3 °C) 98.5 °F (36.9 °C)  -- 99 °F (37.2 °C)    Temp src Oral Oral Oral  -- Oral    Pulse 80 86 87  -- 98    Heart Rate Source Monitor Monitor Monitor  -- Monitor    Resp 18 18 18  -- 18    Resp Rate Source Visual Visual Visual  -- Visual    /62 101/56 126/67  -- 116/58    BP Location Right arm Right arm Right arm  -- Left arm    BP Method Automatic Automatic Automatic  -- Automatic    " Patient Position Lying Lying Lying  -- Lying       Oxygen Therapy    SpO2 94 % 95 % 95 %  -- 95 %    Pulse Oximetry Type  --  -- Intermittent  -- Intermittent    Device (Oxygen Therapy) room air room air room air room air room air    Row Name 03/31/18 2353 03/31/18 1941 03/31/18 1938 03/31/18 1615 03/31/18 1532       Vitals    Temp 99.5 °F (37.5 °C)  -- 100 °F (37.8 °C)   notified nurse 98.2 °F (36.8 °C) 99.9 °F (37.7 °C)   notified nurse    Temp src Oral  -- Oral Oral Oral    Pulse 89  -- 88  -- 83    Heart Rate Source Monitor  -- Monitor  -- Monitor    Resp 18  -- 18  -- 18    Resp Rate Source Visual  -- Visual  -- Visual    /60  -- 105/64  -- 105/52    BP Location Left arm  -- Left arm  -- Right arm    BP Method Automatic  -- Automatic  -- Automatic    Patient Position Lying  -- Lying  -- Lying       Oxygen Therapy    SpO2 95 %  -- 95 %  -- 93 %    Pulse Oximetry Type Intermittent  -- Intermittent  -- Intermittent    Device (Oxygen Therapy) room air room air room air  -- room air    Row Name 03/31/18 1213 03/31/18 0821 03/31/18 0815 03/31/18 0423 03/31/18 0015       Vitals    Temp 97.4 °F (36.3 °C) 98.6 °F (37 °C)  -- 98.9 °F (37.2 °C) 98.4 °F (36.9 °C)    Temp src Oral Oral  -- Axillary Oral    Pulse 85 88  -- 91 89    Heart Rate Source Monitor Monitor  -- Monitor Monitor    Resp 20 20  -- 20 18    Resp Rate Source Visual Visual  -- Visual Visual    /71 115/74  -- 118/54 113/60    BP Location Right arm Right arm  -- Right arm Left arm    BP Method Automatic Automatic  -- Automatic Automatic    Patient Position Lying Lying  -- Lying Lying       Oxygen Therapy    SpO2 95 % 97 %  -- 94 % 97 %    Pulse Oximetry Type  --  --  -- Continuous Continuous    Device (Oxygen Therapy) nasal cannula nasal cannula room air nasal cannula nasal cannula    Flow (L/min) 3 3  -- 3 3    Row Name 03/30/18 2035 03/30/18 2000 03/30/18 1620 03/30/18 1550 03/30/18 1520       Height and Weight    Height  --  --  -- 176.5 cm  "(69.49\")  --    Weight  --  --  -- 88.5 kg (195 lb 1.7 oz)  --    Ideal Body Weight (IBW) (kg)  --  --  -- 75.05  --    BSA (Calculated - sq m)  --  --  -- 2.05 sq meters  --    BMI (Calculated)  --  --  -- 28.4  --    Weight in (lb) to have BMI = 25  --  --  -- 171.3  --       Vitals    Temp 98.1 °F (36.7 °C)  -- 98.2 °F (36.8 °C) 98.2 °F (36.8 °C) 98 °F (36.7 °C)    Temp src Oral  -- Oral Oral Oral    Pulse 83  -- 90 91 88    Heart Rate Source Monitor  -- Monitor Monitor Monitor    Resp 16  -- 16 16 16    Resp Rate Source Visual  -- Visual Visual Visual    /54  -- 127/63 138/69 108/57    BP Location Left arm  -- Right arm Right arm Right arm    BP Method Automatic  -- Automatic Automatic Automatic    Patient Position Lying  -- Lying Lying Lying       Oxygen Therapy    SpO2 94 %  -- 98 % 98 % 98 %    Pulse Oximetry Type Continuous  -- Continuous Continuous Continuous    Device (Oxygen Therapy) nasal prongs nasal cannula nasal cannula nasal cannula nasal cannula    Flow (L/min) 3 3 3 3 3    Row Name 03/30/18 1449 03/30/18 1434 03/30/18 1419 03/30/18 1414 03/30/18 1404       Vitals    Temp 97.7 °F (36.5 °C)  --  --  --  --    Temp src Temporal Artery   --  --  --  --    Pulse 93 99 91  -- 95    Heart Rate Source Monitor  --  --  --  --    Resp 16 18 21  -- 19    Resp Rate Source  --  --  --  -- Monitor    /88 124/78 131/78  -- 132/86    Noninvasive MAP (mmHg) 98 90 90  -- 95    BP Location Right arm  --  --  --  --    BP Method Automatic  --  --  --  --    Patient Position Lying  --  --  --  --       Oxygen Therapy    SpO2 95 % 95 % 96 %  -- 94 %    Device (Oxygen Therapy) nasal cannula nasal cannula nasal cannula nasal cannula nasal cannula    Flow (L/min) 3 3 3  -- 3    Row Name 03/30/18 1349 03/30/18 1344 03/30/18 1339 03/30/18 1334 03/30/18 1329       Vitals    Temp  --  --  --  -- 98.2 °F (36.8 °C)    Temp src  --  --  --  -- Temporal Artery     Pulse 96 99 106 108 114    Heart Rate Source  -- " Monitor Monitor Monitor Monitor    Resp 21 14 14 20 16    Resp Rate Source Monitor Monitor Monitor Monitor Monitor    /65 (!)  101/36 113/55 120/70 135/60    Noninvasive MAP (mmHg) 75 76 68  --  --    BP Method  -- Automatic Automatic Automatic Automatic    Patient Position  -- Lying Lying Lying Lying       Oxygen Therapy    SpO2 93 % 99 % 97 % 97 % 94 %    Pulse Oximetry Type  -- Continuous Continuous Continuous Continuous    Device (Oxygen Therapy) nasal cannula simple face mask simple face mask simple face mask simple face mask    Flow (L/min) 3 8 8 8 8       Art Line    Arterial Line MAP (mmHg)  -- 277 mmHg --  --  --    Row Name 03/30/18 0629 03/30/18 0522                Vitals    Temp  -- 96.8 °F (36 °C)       Temp src  -- Temporal Artery        Pulse 60 60       Heart Rate Source Monitor Monitor       Resp 16 16       Resp Rate Source Visual Visual       BP  -- 118/91       BP Location  -- Left arm       BP Method  -- Automatic       Patient Position  -- Sitting          Oxygen Therapy    SpO2 96 % 96 %       Pulse Oximetry Type Continuous Intermittent       Device (Oxygen Therapy) room air room air           Intake & Output (last 3 days)       03/29 0701 - 03/30 0700 03/30 0701 - 03/31 0700 03/31 0701 - 04/01 0700 04/01 0701 - 04/02 0700    P.O.   600     I.V. (mL/kg)  1528.3 (17.3)      Total Intake(mL/kg)  1528.3 (17.3) 600 (6.8)     Urine (mL/kg/hr)  2125 (1) 750 (0.4)     Total Output   2125 750      Net   -596.7 -150              Unmeasured Urine Occurrence   3 x         Lines, Drains & Airways    Active LDAs     Name:   Placement date:   Placement time:   Site:   Days:    Peripheral IV 03/30/18 0549 Left Forearm  03/30/18    0549    Forearm    2         Inactive LDAs     Name:   Placement date:   Placement time:   Removal date:   Removal time:   Site:   Days:    [REMOVED] Peripheral IV 03/30/18 0556 Right Antecubital  03/30/18    0556    03/30/18    2100    Antecubital    less than 1    [REMOVED]  Peripheral IV Line - Single Lumen 02/26/18 0811 median cubital vein (antecubital fossa), right 22 gauge  02/26/18    0811    02/26/18    0913      less than 1    [REMOVED] Urethral Catheter Non-latex;Silicone;Double-lumen 16 Fr.  03/30/18    0755    03/30/18 2000      less than 1    [REMOVED] ETT   03/30/18    0745 created via procedure documentation  03/30/18    1326      less than 1    [REMOVED] Arterial Line 03/30/18 Right Radial  03/30/18    0656 created via procedure documentation  03/30/18    1357    Radial    less than 1                Hospital Medications (all)       Dose Frequency Start End    acetaminophen (TYLENOL) tablet 650 mg 650 mg Every 4 Hours Scheduled 4/1/2018     Sig - Route: Take 2 tablets by mouth Every 4 (Four) Hours. - Oral    buffered lidocaine syringe 0.5 mL 0.5 mL Once As Needed 3/30/2018 3/30/2018    Sig - Route: Inject 0.5 mL into the skin 1 (One) Time As Needed (Prior to IV Insertion). - Intradermal    ceFAZolin (ANCEF) 2 g/20ml IV PUSH syringe 2 g Once 3/30/2018 3/30/2018    Sig - Route: Infuse 20 mL into a venous catheter 1 (One) Time. - Intravenous    ceFAZolin (ANCEF) 2 g/20ml IV PUSH syringe 2 g Every 8 Hours 3/30/2018 3/31/2018    Sig - Route: Infuse 20 mL into a venous catheter Every 8 (Eight) Hours. - Intravenous    diazePAM (VALIUM) tablet 5 mg 5 mg Every 8 Hours Scheduled 4/1/2018     Sig - Route: Take 1 tablet by mouth Every 8 (Eight) Hours. - Oral    gabapentin (NEURONTIN) capsule 100 mg 100 mg Every 8 Hours Scheduled 4/1/2018     Sig - Route: Take 1 capsule by mouth Every 8 (Eight) Hours. - Oral    heparin (porcine) 5000 UNIT/ML injection 5,000 Units 5,000 Units Every 12 Hours Scheduled 3/31/2018     Sig - Route: Inject 1 mL under the skin Every 12 (Twelve) Hours. - Subcutaneous    HYDROmorphone (DILAUDID) injection 0.5 mg 0.5 mg Every 2 Hours PRN 3/30/2018 4/9/2018    Sig - Route: Infuse 0.25 mL into a venous catheter Every 2 (Two) Hours As Needed for Severe Pain . -  "Intravenous    Linked Group 1:  \"And\" Linked Group Details        hydrOXYzine (ATARAX) tablet 25 mg 25 mg 3 Times Daily PRN 3/30/2018     Sig - Route: Take 1 tablet by mouth 3 (Three) Times a Day As Needed for Itching. - Oral    levothyroxine (SYNTHROID, LEVOTHROID) tablet 100 mcg 100 mcg Every Early Morning 3/30/2018     Sig - Route: Take 1 tablet by mouth Every Morning. - Oral    naloxone (NARCAN) injection 0.4 mg 0.4 mg Every 5 Minutes PRN 3/30/2018     Sig - Route: Infuse 1 mL into a venous catheter Every 5 (Five) Minutes As Needed for Respiratory Depression. - Intravenous    Linked Group 1:  \"And\" Linked Group Details        ondansetron (ZOFRAN) injection 4 mg 4 mg Every 6 Hours PRN 3/30/2018     Sig - Route: Infuse 2 mL into a venous catheter Every 6 (Six) Hours As Needed for Nausea or Vomiting. - Intravenous    Linked Group 2:  \"Or\" Linked Group Details        ondansetron (ZOFRAN) tablet 4 mg 4 mg Every 6 Hours PRN 3/30/2018     Sig - Route: Take 1 tablet by mouth Every 6 (Six) Hours As Needed for Nausea or Vomiting. - Oral    Linked Group 2:  \"Or\" Linked Group Details        ondansetron ODT (ZOFRAN-ODT) disintegrating tablet 4 mg 4 mg Every 6 Hours PRN 3/30/2018     Sig - Route: Take 1 tablet by mouth Every 6 (Six) Hours As Needed for Nausea or Vomiting. - Oral    Linked Group 2:  \"Or\" Linked Group Details        oxyCODONE (ROXICODONE) immediate release tablet 15 mg 15 mg Every 4 Hours PRN 4/1/2018 4/11/2018    Sig - Route: Take 3 tablets by mouth Every 4 (Four) Hours As Needed for Moderate Pain . - Oral    sennosides-docusate sodium (SENOKOT-S) 8.6-50 MG tablet 2 tablet 2 tablet 2 Times Daily 3/30/2018     Sig - Route: Take 2 tablets by mouth 2 (Two) Times a Day. - Oral    sertraline (ZOLOFT) tablet 150 mg 150 mg Daily 3/30/2018     Sig - Route: Take 150 mg by mouth Daily. - Oral    sodium chloride 0.9 % flush 1-10 mL 1-10 mL As Needed 3/30/2018     Sig - Route: Infuse 1-10 mL into a venous catheter As " Needed for Line Care. - Intravenous    acetaminophen (TYLENOL) tablet 650 mg (Discontinued) 650 mg Every 6 Hours PRN 3/30/2018 3/30/2018    Sig - Route: Take 2 tablets by mouth Every 6 (Six) Hours As Needed for Mild Pain . - Oral    Reason for Discontinue: Duplicate order    acetaminophen (TYLENOL) tablet 650 mg (Discontinued) 650 mg Every 4 Hours PRN 3/30/2018 4/1/2018    Sig - Route: Take 2 tablets by mouth Every 4 (Four) Hours As Needed for Mild Pain . - Oral    atropine sulfate injection 0.5 mg (Discontinued) 0.5 mg Once As Needed 3/30/2018 3/30/2018    Sig - Route: Infuse 5 mL into a venous catheter 1 (One) Time As Needed for Bradycardia (symptomatic bradycardia (hypotension, dizziness, confusion). Notify attending anesthesiologist.). - Intravenous    Reason for Discontinue: Patient Transfer    buffered lidocaine syringe 0.5 mL (Discontinued) 0.5 mL Once As Needed 3/30/2018 3/30/2018    Sig - Route: Inject 0.5 mL as directed 1 (One) Time As Needed (IV Start). - Injection    Reason for Discontinue: Patient Transfer    buffered lidocaine syringe 0.5 mL (Discontinued) 0.5 mL Once As Needed 3/30/2018 3/30/2018    Sig - Route: Inject 0.5 mL as directed 1 (One) Time As Needed (IV Start). - Injection    Reason for Discontinue: Patient Transfer    bupivacaine-EPINEPHrine PF (MARCAINE w/EPI) 0.25% -[id number] injection (Discontinued)  As Needed 3/30/2018 3/30/2018    Sig: As Needed.    Reason for Discontinue: Patient Discharge    diazePAM (VALIUM) injection 5 mg (Discontinued) 5 mg Every 4 Hours PRN 3/30/2018 3/30/2018    Sig - Route: Infuse 1 mL into a venous catheter Every 4 (Four) Hours As Needed for Muscle Spasms. - Intravenous    Reason for Discontinue: Reorder    diazePAM (VALIUM) tablet 5 mg (Discontinued) 5 mg Every 4 Hours PRN 3/30/2018 4/1/2018    Sig - Route: Take 1 tablet by mouth Every 4 (Four) Hours As Needed for Muscle Spasms. - Oral    flumazenil (ROMAZICON) injection 0.2 mg (Discontinued) 0.2 mg As Needed  3/30/2018 3/30/2018    Sig - Route: Infuse 2 mL into a venous catheter As Needed (unresponsiveness). - Intravenous    Reason for Discontinue: Patient Transfer    gelatin absorbable (GELFOAM) powder (Discontinued)  As Needed 3/30/2018 3/30/2018    Sig: As Needed.    Reason for Discontinue: Patient Discharge    hydrALAZINE (APRESOLINE) injection 5 mg (Discontinued) 5 mg Every 10 Minutes PRN 3/30/2018 3/30/2018    Sig - Route: Infuse 0.25 mL into a venous catheter Every 10 (Ten) Minutes As Needed for High Blood Pressure (for systolic blood pressure greater than 180 mmHg or diastolic blood pressure greater than 105 mmHg when HR less than 60). - Intravenous    Reason for Discontinue: Patient Transfer    HYDROcodone-acetaminophen (NORCO) 7.5-325 MG per tablet 1 tablet (Discontinued) 1 tablet Every 4 Hours PRN 3/30/2018 3/31/2018    Sig - Route: Take 1 tablet by mouth Every 4 (Four) Hours As Needed for Moderate Pain . - Oral    HYDROmorphone (DILAUDID) injection 0.5 mg (Discontinued) 0.5 mg As Needed 3/30/2018 3/30/2018    Sig - Route: Infuse 0.5 mL into a venous catheter As Needed for Moderate Pain  or Severe Pain . - Intravenous    Reason for Discontinue: Patient Transfer    HYDROmorphone (DILAUDID) PCA 1 mg/mL syringe (Discontinued)  Continuous 3/30/2018 3/31/2018    Sig - Route: Infuse  into a venous catheter Continuous. - Intravenous    ipratropium-albuterol (DUO-NEB) nebulizer solution 3 mL (Discontinued) 3 mL Once As Needed 3/30/2018 3/30/2018    Sig - Route: Take 3 mL by nebulization 1 (One) Time As Needed for Wheezing or Shortness of Air (bronchospasm). - Nebulization    Reason for Discontinue: Patient Transfer    labetalol (NORMODYNE,TRANDATE) injection 5 mg (Discontinued) 5 mg Every 5 Minutes PRN 3/30/2018 3/30/2018    Sig - Route: Infuse 1 mL into a venous catheter Every 5 (Five) Minutes As Needed for High Blood Pressure (for systolic blood pressure greater than 180 mmHg or diastolic blood pressure greater than  "105 mmHg). - Intravenous    Reason for Discontinue: Patient Transfer    lactated ringers infusion 1,000 mL (Discontinued) 1,000 mL Continuous 3/30/2018 3/30/2018    Sig - Route: Infuse 1,000 mL into a venous catheter Continuous. - Intravenous    lactated ringers infusion (Discontinued) 30 mL/hr Continuous 3/30/2018 3/30/2018    Sig - Route: Infuse 30 mL/hr into a venous catheter Continuous. - Intravenous    lactated ringers infusion (Discontinued) 100 mL/hr Continuous 3/30/2018 3/30/2018    Sig - Route: Infuse 100 mL/hr into a venous catheter Continuous. - Intravenous    lactated ringers infusion (Discontinued) 100 mL/hr Continuous 3/30/2018 3/30/2018    Sig - Route: Infuse 100 mL/hr into a venous catheter Continuous. - Intravenous    meperidine (DEMEROL) injection 12.5 mg (Discontinued) 12.5 mg Every 5 Minutes PRN 3/30/2018 3/30/2018    Sig - Route: Infuse 0.5 mL into a venous catheter Every 5 (Five) Minutes As Needed for Shivering (May repeat x 3). - Intravenous    Reason for Discontinue: Patient Transfer    metoclopramide (REGLAN) injection 5 mg (Discontinued) 5 mg Every 15 Minutes PRN 3/30/2018 3/30/2018    Sig - Route: Infuse 1 mL into a venous catheter Every 15 (Fifteen) Minutes As Needed for Nausea or Vomiting (for nausea/vomiting). - Intravenous    Reason for Discontinue: Patient Transfer    midazolam (VERSED) injection 1 mg (Discontinued) 1 mg Every 5 Minutes PRN 3/30/2018 3/30/2018    Sig - Route: Infuse 1 mL into a venous catheter Every 5 (Five) Minutes As Needed for Anxiety (Max 4mg midazolam TOTAL). - Intravenous    Reason for Discontinue: Patient Transfer    Linked Group 3:  \"Or\" Linked Group Details        midazolam (VERSED) injection 1 mg (Discontinued) 1 mg Every 5 Minutes PRN 3/30/2018 3/30/2018    Sig - Route: Infuse 1 mL into a venous catheter Every 5 (Five) Minutes As Needed for Anxiety (Max 4mg midazolam TOTAL). - Intravenous    Reason for Discontinue: Patient Transfer    Linked Group 4:  \"Or\" " "Linked Group Details        midazolam (VERSED) injection 2 mg (Discontinued) 2 mg Every 5 Minutes PRN 3/30/2018 3/30/2018    Sig - Route: Infuse 2 mL into a venous catheter Every 5 (Five) Minutes As Needed for Anxiety (Max 4mg midazolam TOTAL). - Intravenous    Reason for Discontinue: Patient Transfer    Linked Group 3:  \"Or\" Linked Group Details        midazolam (VERSED) injection 2 mg (Discontinued) 2 mg Every 5 Minutes PRN 3/30/2018 3/30/2018    Sig - Route: Infuse 2 mL into a venous catheter Every 5 (Five) Minutes As Needed for Anxiety (Max 4mg midazolam TOTAL). - Intravenous    Reason for Discontinue: Patient Transfer    Linked Group 4:  \"Or\" Linked Group Details        Morphine sulfate (PF) injection 2 mg (Discontinued) 2 mg As Needed 3/30/2018 3/30/2018    Sig - Route: Infuse 1 mL into a venous catheter As Needed for Mild Pain . - Intravenous    Reason for Discontinue: Patient Transfer    naloxone (NARCAN) injection 0.04 mg (Discontinued) 0.04 mg As Needed 3/30/2018 3/30/2018    Sig - Route: Infuse 0.1 mL into a venous catheter As Needed for Opioid Reversal (unresponsiveness, decrease oxygen saturation). - Intravenous    Reason for Discontinue: Patient Transfer    naloxone (NARCAN) injection 0.1 mg (Discontinued) 0.1 mg Every 5 Minutes PRN 3/30/2018 3/31/2018    Sig - Route: Infuse 0.25 mL into a venous catheter Every 5 (Five) Minutes As Needed for Opioid Reversal or Respiratory Depression (see administration instructions). - Intravenous    ondansetron (ZOFRAN) injection 4 mg (Discontinued) 4 mg As Needed 3/30/2018 3/30/2018    Sig - Route: Infuse 2 mL into a venous catheter As Needed for Nausea or Vomiting. - Intravenous    Reason for Discontinue: Patient Transfer    oxyCODONE-acetaminophen (PERCOCET)  MG per tablet 1 tablet (Discontinued) 1 tablet Every 4 Hours PRN 3/31/2018 4/1/2018    Sig - Route: Take 1 tablet by mouth Every 4 (Four) Hours As Needed for Severe Pain . - Oral    " oxyCODONE-acetaminophen (PERCOCET) 5-325 MG per tablet 1 tablet (Discontinued) 1 tablet Every 4 Hours PRN 3/31/2018 4/1/2018    Sig - Route: Take 1 tablet by mouth Every 4 (Four) Hours As Needed for Moderate Pain . - Oral    sodium chloride (NS) irrigation solution (Discontinued)  As Needed 3/30/2018 3/30/2018    Sig: As Needed.    Reason for Discontinue: Patient Discharge    sodium chloride 0.9 % flush 1-10 mL (Discontinued) 1-10 mL As Needed 3/30/2018 3/30/2018    Sig - Route: Infuse 1-10 mL into a venous catheter As Needed for Line Care. - Intravenous    Reason for Discontinue: Patient Transfer    sodium chloride 0.9 % flush 1-10 mL (Discontinued) 1-10 mL As Needed 3/30/2018 3/30/2018    Sig - Route: Infuse 1-10 mL into a venous catheter As Needed for Line Care. - Intravenous    Reason for Discontinue: Patient Transfer    sodium chloride 0.9 % flush 1-10 mL (Discontinued) 1-10 mL As Needed 3/30/2018 3/30/2018    Sig - Route: Infuse 1-10 mL into a venous catheter As Needed for Line Care. - Intravenous    Reason for Discontinue: Patient Transfer    sodium chloride 0.9 % flush 3 mL (Discontinued) 3 mL As Needed 3/30/2018 3/30/2018    Sig - Route: Infuse 3 mL into a venous catheter As Needed for Line Care. - Intravenous    Reason for Discontinue: Patient Transfer    sodium chloride 0.9 % solution (Discontinued)  As Needed 3/30/2018 3/30/2018    Sig: As Needed.    Reason for Discontinue: Patient Discharge    sodium chloride 0.9 % with KCl 20 mEq/L infusion (Discontinued) 100 mL/hr Continuous 3/30/2018 3/31/2018    Sig - Route: Infuse 100 mL/hr into a venous catheter Continuous. - Intravenous    sodium chloride 1,000 mL with bacitracin 50,000 Units irrigation (Discontinued)  As Needed 3/30/2018 3/30/2018    Sig: As Needed.    Reason for Discontinue: Patient Discharge    thrombin spray (Discontinued)  As Needed 3/30/2018 3/30/2018    Sig: As Needed.    Reason for Discontinue: Patient Discharge          Lab Results (last  72 hours)     ** No results found for the last 72 hours. **        Imaging Results (last 72 hours)     Procedure Component Value Units Date/Time    XR Spine Lumbar AP & Lateral [[id number]] Collected:  03/30/18 1424     Updated:  04/01/18 1410    Narrative:       HISTORY: Lumbar fusion     Lumbar spine: 4 spot views of the lumbar spine are obtained  intraoperatively.     Fluoroscopy time 14 seconds     There is anterior cervical fusion of L5 and S1 with an interposition  graft placed between the fused vertebra. Alignment appears appropriate.  This report was finalized on 03/30/2018 14:25 by Dr. Cristel Joseph MD.    FL C Arm During Surgery [[id number]] Collected:  03/30/18 1524     Updated:  04/01/18 1410    Narrative:       HISTORY: Lumbar fusion     Lumbar spine: 4 spot views of the lumbar spine are obtained  intraoperatively.     Fluoroscopy time 14 seconds     There is anterior cervical fusion of L5 and S1 with an interposition  graft placed between the fused vertebra. Alignment appears appropriate.  This report was finalized on 03/30/2018 14:25 by Dr. Cristel Joseph MD.    XR Spine Lumbar 2 or 3 View [[id number]] Collected:  03/30/18 2018     Updated:  03/30/18 2023    Narrative:       EXAMINATION: XR SPINE LUMBAR 2 OR 3 VW- 3/30/2018 8:18 PM CDT     HISTORY: Postoperative exam following lumbosacral fusion     COMPARISON: 9/8/2017     FINDINGS:  There are 5 non rib-bearing vertebral bodies. There has been anterior  and posterior fusion at L5-S1. An interbody disc spacer is in place.  Alignment of the lumbar spine appears normal. Vertebral body heights  appear well maintained. Skin staples remain in place.       Impression:       Postsurgical changes of the lumbosacral spine as above  without appreciable acute bony abnormality.  This report was finalized on 03/30/2018 20:19 by Dr. Ky Andujar MD.    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 No radiologist was present for the study. Based on  the images, the hardware appears to be in satisfactory position. Please  review Dr. Almaraz's intraoperative notes for additional details. The  images are stored in PACS for review.        This report was finalized on 03/30/2018 15:44 by Dr. Solo Tolbert MD.    FL O Arm During Surgery [[id number]] Updated:  03/30/18 1529    XR Abdomen KUB [[id number]] Collected:  03/30/18 0952     Updated:  03/30/18 1413    Narrative:       EXAMINATION: XR ABDOMEN KUB- 3/30/2018 9:52 AM CDT     HISTORY: Instrument count; M51.16-Intervertebral disc disorders with  radiculopathy, lumbar region.     REPORT: A supine view the abdomen was obtained in the operating room for  instrument count. Correlation is made with an AP view the pelvis from  3/20/2018. There has been interval lumbosacral fusion surgery, a  surgical instruments are seen in the field-of-view. The bowel gas  pattern appears normal. The image is stored in PACS for review.  This report was finalized on 03/30/2018 10:09 by Dr. Solo Tolbert MD.          Orders (last 72 hrs)     Start     Ordered    04/01/18 1400  gabapentin (NEURONTIN) capsule 100 mg  Every 8 Hours Scheduled      04/01/18 1137    04/01/18 1400  diazePAM (VALIUM) tablet 5 mg  Every 8 Hours Scheduled      04/01/18 1137    04/01/18 1200  acetaminophen (TYLENOL) tablet 650  mg  Every 4 Hours Scheduled      18 1137    18 113  oxyCODONE (ROXICODONE) immediate release tablet 15 mg  Every 4 Hours PRN      18 1137    18  oxyCODONE-acetaminophen (PERCOCET)  MG per tablet 1 tablet  Every 4 Hours PRN,   Status:  Discontinued      18 09  oxyCODONE-acetaminophen (PERCOCET) 5-325 MG per tablet 1 tablet  Every 4 Hours PRN,   Status:  Discontinued      1823    18 09  OT Plan of Care Cert / Re-Cert  Once     Comments:  Occupational Therapy Plan of Care  Initial Certification  Certification Period: 3/31/2018 - 2018    Patient Name: Eric Caldera  : 1960    (M51.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  (Z78.9) Decreased activities of daily living (ADL)                Assessment  OT Assessment  OT Diagnosis: decreased ADL  Rehab Potential (OT Eval): good, to achieve stated therapy goals  Criteria for Skilled Therapeutic Interventions Met (OT Eval): yes, treatment indicated              OT Rehab Goals     Row Name 18 0800             Transfer Goal 1 (OT)    Activity/Assistive Device (Transfer Goal 1, OT) tub  -AC      [place] Level/Cues Needed (Transfer Goal 1, OT) contact guard assist    -AC      Time Frame (Transfer Goal 1, OT) long term goal (LTG);10 days  -AC      Progress/Outcome (Transfer Goal 1, OT) goal ongoing  -AC         Bathing Goal 1 (OT)    Activity/Assistive Device (Bathing Goal 1, OT) lower body bathing  -AC      [place] Level/Cues Needed (Bathing Goal 1, OT) minimum assist (75%   or more patient effort)  -AC      Time Frame (Bathing Goal 1, OT) long term goal (LTG);10 days  -AC      Progress/Outcomes (Bathing Goal 1, OT) goal ongoing  -AC         Dressing Goal 1 (OT)    Activity/Assistive Device (Dressing Goal 1, OT) lower body dressing  -AC        [place]/Cues Needed (Dressing Goal 1, OT) minimum assist (75% or   more patient effort)  -AC      Time Frame (Dressing  Goal 1, OT) long term goal (LTG);10 days  -AC      Progress/Outcome (Dressing Goal 1, OT) goal ongoing  -AC         Patient Education Goal (OT)    Activity (Patient Education Goal, OT) spinal precautions, LSO wear/care    -AC      [place]/Cues/Accuracy (Memory Goal 2, OT) demonstrates   adequately;independent;verbalizes understanding  -AC      Time Frame (Patient Education Goal, OT) long term goal (LTG);10 days  -AC        Progress/Outcome (Patient Education Goal, OT) goal ongoing  -AC        User Key  (r) = Recorded By, (t) = Taken By, (c) = Cosigned By    Initials Name Provider Type    YARITZA Ariel MALU Villarreal, OTR/L Occupational Therapist              OT Goals     Row Name 03/31/18 0917          Time Calculation    OT Goal Re-Cert Due Date 04/10/18  -AC       User Key  (r) = Recorded By, (t) = Taken By, (c) = Cosigned By    Initials Name Provider Type    YARITZA Villarreal OTR/L Occupational Therapist          Plan    OT Plan  Therapy Frequency (OT Eval): daily  Predicted Duration of Therapy Intervention (OT Eval): 10 days  Outcome Summary: OT eval completed.  Pain was pt's biggest limiting factor during eval.  He requires maxA for donning socks and LSO.  He is unable to reach his feet for ADL.  Ean for log roll, CGA for transfers, hand held assist to walk to door and to chair.  Pt was instructed on spinal precautions, log roll, brace wear/care.  He will need reinforcement of the above teaching.  Skilled OT required to address decreased ADL function, balance and safety.  Anticipate discharge home with son.       Ariel POOLE DANITA Villarreal/L  3/31/2018        By cosigning this order, either electronically or physically, I certify that the therapy services are furnished while this patient is under my care, the services outline above are required by this patient, and will be reviewed every 90 days.        MCasieD.:__________________________________________ Date: ______________    03/31/18 0922    03/31/18 0915  PT Plan of  Care Cert / Re-Cert  Once     Comments:  Physical Therapy Plan of Care  Initial Certification  Certification Period: 3/31/2018 - 2018    Patient Name: Eric Caldera  : 1960    (M51.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                  Assessment  PT Assessment  Functional Level at Time of Evaluation (PT Clinical Impression): CGA to min asst  Impairments Found (describe specific impairments): gait, locomotion, and balance  PT Diagnosis (PT Clinical Impression): abnormal gait/mobility  Criteria for Skilled Interventions Met (PT Clinical Impression): yes              PT Rehab Goals     Row Name 18 0830             Bed Mobility Goal 1 (PT)    Activity/Assistive Device (Bed Mobility Goal 1, PT) bed mobility   activities, all  -TB      [place] Level/Cues Needed (Bed Mobility Goal 1, PT) independent  -TB        Time Frame (Bed Mobility Goal 1, PT) by discharge  -TB      Barriers (Bed Mobility Goal 1, PT) pain  -TB      Progress/Outcomes (Bed Mobility Goal 1, PT) good progress toward goal    -TB         Transfer Goal 1 (PT)    Activity/Assistive Device (Transfer Goal 1, PT) transfers, all  -TB      [place] Level/Cues Needed (Transfer Goal 1, PT) supervision required    -TB      Time Frame (Transfer Goal 1, PT) by discharge  -TB      Barriers (Transfers Goal 1, PT) pain  -TB      Progress/Outcome (Transfer Goal 1, PT) good progress toward goal  -TB         Gait Training Goal 1 (PT)    Activity/Assistive Device (Gait Training Goal 1, PT) gait (walking   locomotion)  -TB      [place] Level (Gait Training Goal 1, PT) supervision required  -TB        Distance (Gait Goal 1, PT) 200  -TB      Time Frame (Gait Training Goal 1, PT) by discharge  -TB      Barriers (Gait Training Goal 1, PT) pain  -TB      Progress/Outcome (Gait Training Goal 1, PT) good progress toward goal    -TB         Patient Education Goal (PT)    Activity (Patient Education Goal, PT) Independent with don/doff/use of    LSO  -TB      [place]/Cues/Accuracy (Memory Goal 2, PT) demonstrates adequately    -TB      Time Frame (Patient Education Goal, PT) by discharge  -TB      Barriers (Patient Education Goal, PT) pain  -TB      Progress/Outcome (Patient Education Goal, PT) good progress toward goal    -TB        User Key  (r) = Recorded By, (t) = Taken By, (c) = Cosigned By    Initials Name Provider Type    TB Dilip Avila, PT Physical Therapist              Plan  PT Plan  Therapy Frequency (PT Clinical Impression): 2 times/day  Planned Therapy Interventions (PT Eval): bed mobility training, gait training, patient/family education, strengthening, transfer training  Outcome Summary: Eval completed. He walked 15 feet with hand hold asst. Pain at 8/10 but says for first time in years, he has no throbbing pain in his legs.         Dilip Avila, PT  3/31/2018            By cosigning this order, either electronically or physically, I certify that the therapy services are furnished while this patient is under my care, the services outline above are required by this patient, and will be reviewed every 90 days.        M.D.:__________________________________________ Date: ______________    03/31/18 0914    03/31/18 0900  heparin (porcine) 5000 UNIT/ML injection 5,000 Units  Every 12 Hours Scheduled      03/30/18 1522    03/30/18 2100  sennosides-docusate sodium (SENOKOT-S) 8.6-50 MG tablet 2 tablet  2 Times Daily      03/30/18 1522    03/30/18 2000  ceFAZolin (ANCEF) 2 g/20ml IV PUSH syringe  Every 8 Hours      03/30/18 1522    03/30/18 1809  Inpatient Advance Care Planning Consult  Once     Provider:  (Not yet assigned)    03/30/18 1809    03/30/18 1600  sertraline (ZOLOFT) tablet 150 mg  Daily      03/30/18 1522    03/30/18 1600  Incentive Spirometry  Every 2 Hours While Awake      03/30/18 1522    03/30/18 1600  Vital Signs  Every 4 Hours      03/30/18 1522    03/30/18 1600  Neurovascular Checks  Every 4 Hours      03/30/18 1522     03/30/18 1600  Strict Intake and Output  Every 4 Hours      03/30/18 1522    03/30/18 1600  sodium chloride 0.9 % with KCl 20 mEq/L infusion  Continuous,   Status:  Discontinued      03/30/18 1522    03/30/18 1600  Clear & Record PCA Pump Settings  Every 4 Hours,   Status:  Canceled      03/30/18 1522    03/30/18 1600  HYDROmorphone (DILAUDID) PCA 1 mg/mL syringe  Continuous,   Status:  Discontinued      03/30/18 1522    03/30/18 1535  diazePAM (VALIUM) tablet 5 mg  Every 4 Hours PRN,   Status:  Discontinued      03/30/18 1535    03/30/18 1530  levothyroxine (SYNTHROID, LEVOTHROID) tablet 100 mcg  Every Early Morning      03/30/18 1522    03/30/18 1527  FL O Arm During Surgery  1 Time Imaging      03/30/18 1526    03/30/18 1526  CT Limited Localized Follow Up Study  1 Time Imaging      03/30/18 1526    03/30/18 1523  Assess Need for Indwelling Urinary Catheter - Follow Removal Protocol  Continuous     Comments:  Indwelling Urinary Catheter Removal Criteria  Discontinue Indwelling Urinary Catheter Unless One of the Following is Present  Urinary Retention or Obstruction  Chronic Davis Catheter Use  End of Life  Critical Illness with Strict I/O   Tract or Abdominal Surgery  Stage 3/4 Sacral / Perineal Wound  Required Activity Restriction: Trauma  Required Activity Restriction: Spine Surgery  If Patient is Being Followed by Urology Contact Them PRIOR to Removal  Do Not Remove Indwelling Urinary Catheter Order is Present with a CLINICAL REASON to Maintain the Catheter. Provider is Required to Include a Clinical Reason to Maintain a Urinary Catheter    Chronic Davis Catheter Use (Present on Admission)  Assess for Continued Need & Document Medical Necessity  If Infection is Suspected, Contact the Provider        03/30/18 1522    03/30/18 1523  Catheter Care  Every Shift      03/30/18 1522    03/30/18 1523  Oxygen Therapy- Nasal Cannula; Titrate for SPO2: equal to or greater than, 92%, per policy  Continuous       03/30/18 1522    03/30/18 1523  Continuous Pulse Oximetry  Continuous      03/30/18 1522    03/30/18 1523  Advance Diet as Tolerated  Until Discontinued      03/30/18 1522    03/30/18 1523  Insert Peripheral IV  Once      03/30/18 1522    03/30/18 1523  Saline Lock & Maintain IV Access  Continuous      03/30/18 1522    03/30/18 1523  Full Code  Continuous      03/30/18 1522    03/30/18 1523  VTE Risk Assessment - Moderate Risk  Once      03/30/18 1522    03/30/18 1523  Place Compression Stockings (TEDs)  Once      03/30/18 1522    03/30/18 1523  Remove & Replace Compression Stockings (TEDS) Daily  Daily      03/30/18 1522    03/30/18 1523  Place Sequential Compression Device  Once      03/30/18 1522    03/30/18 1523  Maintain Sequential Compression Device  Continuous      03/30/18 1522    03/30/18 1523  Ambulate Patient  Every Shift     Comments:  In LSO    03/30/18 1522    03/30/18 1523  Saline Lock IV With Adequate PO Intake  Continuous      03/30/18 1522    03/30/18 1523  Discontinue Indwelling Urinary Catheter  Once      03/30/18 1522    03/30/18 1523  Notify Provider if Bladder Distention Continues  Until Discontinued      03/30/18 1522    03/30/18 1523  Consult Pharmacist For Review of Medications That May Cause Urinary Retention - RN To Place Order for Consult it Needed  Continuous      03/30/18 1522    03/30/18 1523  Notify Provider - Access Site  Until Discontinued      03/30/18 1522    03/30/18 1523  Notify Provider of Changes in Neuro Status  Until Discontinued      03/30/18 1522    03/30/18 1523  Notify Provider (Standard Adult Parameters)  Until Discontinued      03/30/18 1522    03/30/18 1523  Turn Cough Deep Breathe  Once      03/30/18 1522    03/30/18 1523  Diet Regular  Diet Effective Now      03/30/18 1522    03/30/18 1523  PT Consult: Eval & Treat  Once      03/30/18 1522    03/30/18 1523  OT Consult: Eval & Treat ambulation  Once      03/30/18 1522    03/30/18 1523  Assess Respiratory Rate, Pain  Score & Sedation Level Using [place] Opioid-Sedation Scale (POSS)  Per Hospital Policy,   Status:  Canceled     Comments:  Assess every 2 hours x 24 hours, then every 4 hours and PRN while receiving PCA.    03/30/18 1522    03/30/18 1523  Notify Provider for Inadequate Pain Control, Excessive Sedation or Other Issues Regarding PCA Therapy  Until Discontinued,   Status:  Canceled      03/30/18 1522    03/30/18 1523  XR Spine Lumbar 2 or 3 View  1 Time Imaging      03/30/18 1522    03/30/18 1522  HYDROmorphone (DILAUDID) injection 0.5 mg  Every 2 Hours PRN      03/30/18 1522    03/30/18 1522  naloxone (NARCAN) injection 0.4 mg  Every 5 Minutes PRN      03/30/18 1522    03/30/18 1522  ondansetron (ZOFRAN) tablet 4 mg  Every 6 Hours PRN      03/30/18 1522    03/30/18 1522  ondansetron ODT (ZOFRAN-ODT) disintegrating tablet 4 mg  Every 6 Hours PRN      03/30/18 1522    03/30/18 1522  ondansetron (ZOFRAN) injection 4 mg  Every 6 Hours PRN      03/30/18 1522    03/30/18 1522  acetaminophen (TYLENOL) tablet 650 mg  Every 6 Hours PRN,   Status:  Discontinued      03/30/18 1522    03/30/18 1522  hydrOXYzine (ATARAX) tablet 25 mg  3 Times Daily PRN      03/30/18 1522    03/30/18 1522  sodium chloride 0.9 % flush 1-10 mL  As Needed      03/30/18 1522    03/30/18 1522  acetaminophen (TYLENOL) tablet 650 mg  Every 4 Hours PRN,   Status:  Discontinued      03/30/18 1522    03/30/18 1522  HYDROcodone-acetaminophen (NORCO) 7.5-325 MG per tablet 1 tablet  Every 4 Hours PRN,   Status:  Discontinued      03/30/18 1522    03/30/18 1522  diazePAM (VALIUM) injection 5 mg  Every 4 Hours PRN,   Status:  Discontinued      03/30/18 1522    03/30/18 1522  naloxone (NARCAN) injection 0.1 mg  Every 5 Minutes PRN,   Status:  Discontinued      03/30/18 1522    03/30/18 1413  FL C Arm During Surgery  1 Time Imaging      03/30/18 1412    03/30/18 1412  XR Spine Lumbar AP & Lateral  1 Time Imaging      03/30/18 1412    03/30/18 1338  Vital signs every  5 minutes for 15 minutes, every 15 minutes thereafter.  Once,   Status:  Canceled      03/30/18 1337 03/30/18 1338  Call Anesthesiologist for additional IV Fluid bolus for Hypotension/Tachycardia  Continuous,   Status:  Canceled      03/30/18 1337 03/30/18 1338  Notify Anesthesia of Any Acute Changes in Patient Condition  Until Discontinued,   Status:  Canceled      03/30/18 1337    03/30/18 1338  Notify Anesthesia for Unrelieved Pain  Until Discontinued,   Status:  Canceled      03/30/18 1337 03/30/18 1338  Once DC criteria to floor met, follow surgeon's orders.  Until Discontinued,   Status:  Canceled      03/30/18 1337 03/30/18 1338  Discharge patient from PACU when discharge criteria is met.  Until Discontinued,   Status:  Canceled      03/30/18 1337 03/30/18 1337  Apply warming blanket  As Needed,   Status:  Canceled     Comments:  For a recorded temp of <36.9 C    03/30/18 1337 03/30/18 1337  Morphine sulfate (PF) injection 2 mg  As Needed,   Status:  Discontinued      03/30/18 1337 03/30/18 1337  HYDROmorphone (DILAUDID) injection 0.5 mg  As Needed,   Status:  Discontinued      03/30/18 1337 03/30/18 1337  labetalol (NORMODYNE,TRANDATE) injection 5 mg  Every 5 Minutes PRN,   Status:  Discontinued      03/30/18 1337 03/30/18 1337  hydrALAZINE (APRESOLINE) injection 5 mg  Every 10 Minutes PRN,   Status:  Discontinued      03/30/18 1337 03/30/18 1337  atropine sulfate injection 0.5 mg  Once As Needed,   Status:  Discontinued      03/30/18 1337 03/30/18 1337  ipratropium-albuterol (DUO-NEB) nebulizer solution 3 mL  Once As Needed,   Status:  Discontinued      03/30/18 1337 03/30/18 1337  naloxone (NARCAN) injection 0.04 mg  As Needed,   Status:  Discontinued      03/30/18 1337 03/30/18 1337  flumazenil (ROMAZICON) injection 0.2 mg  As Needed,   Status:  Discontinued      03/30/18 1337 03/30/18 1337  ondansetron (ZOFRAN) injection 4 mg  As Needed,   Status:  Discontinued       03/30/18 1337    03/30/18 1337  metoclopramide (REGLAN) injection 5 mg  Every 15 Minutes PRN,   Status:  Discontinued      03/30/18 1337    03/30/18 1337  meperidine (DEMEROL) injection 12.5 mg  Every 5 Minutes PRN,   Status:  Discontinued      03/30/18 1337    03/30/18 0914  XR Abdomen KUB  1 Time Imaging      03/30/18 0915    03/30/18 0806  Insert Davis Catheter  Once,   Status:  Canceled      03/30/18 0811    03/30/18 0801  sodium chloride 0.9 % solution  As Needed,   Status:  Discontinued      03/30/18 0801    03/30/18 0801  sodium chloride (NS) irrigation solution  As Needed,   Status:  Discontinued      03/30/18 0801    03/30/18 0801  thrombin spray  As Needed,   Status:  Discontinued      03/30/18 0801    03/30/18 0800  bupivacaine-EPINEPHrine PF (MARCAINE w/EPI) 0.25% -1:[id number] injection  As Needed,   Status:  Discontinued      03/30/18 0800    03/30/18 0800  gelatin absorbable (GELFOAM) powder  As Needed,   Status:  Discontinued      03/30/18 0800    03/30/18 0759  sodium chloride 1,000 mL with bacitracin 50,000 Units irrigation  As Needed,   Status:  Discontinued      03/30/18 0800    03/30/18 0715  lactated ringers infusion  Continuous,   Status:  Discontinued      03/30/18 0639    03/30/18 0715  lactated ringers infusion  Continuous,   Status:  Discontinued      03/30/18 0643    03/30/18 0643  Oxygen Therapy- Nasal Cannula; Titrate for SPO2: equal to or greater than, 90%  Continuous,   Status:  Canceled      03/30/18 0643    03/30/18 0643  Pulse Oximetry, Continuous  Continuous,   Status:  Canceled      03/30/18 0643    03/30/18 0643  Insert Peripheral IV  Once,   Status:  Canceled      03/30/18 0643    03/30/18 0643  Saline Lock & Maintain IV Access  Continuous,   Status:  Canceled      03/30/18 0643    03/30/18 0643  Oxygen Therapy- Nasal Cannula; Titrate for SPO2: equal to or greater than, 92%  Continuous,   Status:  Canceled      03/30/18 0643    03/30/18 0643  Pulse Oximetry, Continuous   Continuous,   Status:  Canceled      03/30/18 0643    03/30/18 0642  midazolam (VERSED) injection 1 mg  Every 5 Minutes PRN,   Status:  Discontinued      03/30/18 0643    03/30/18 0642  midazolam (VERSED) injection 2 mg  Every 5 Minutes PRN,   Status:  Discontinued      03/30/18 0643    03/30/18 0642  Vital Signs - Per Anesthesia Protocol  As Needed,   Status:  Canceled      03/30/18 0643    03/30/18 0642  sodium chloride 0.9 % flush 1-10 mL  As Needed,   Status:  Discontinued      03/30/18 0643    03/30/18 0642  buffered lidocaine syringe 0.5 mL  Once As Needed,   Status:  Discontinued      03/30/18 0643    03/30/18 0639  Oxygen Therapy- Nasal Cannula; Titrate for SPO2: equal to or greater than, 90%  Continuous,   Status:  Canceled      03/30/18 0639    03/30/18 0639  Pulse Oximetry, Continuous  Continuous,   Status:  Canceled      03/30/18 0639    03/30/18 0639  Insert Peripheral IV  Once,   Status:  Canceled      03/30/18 0639    03/30/18 0639  Saline Lock & Maintain IV Access  Continuous,   Status:  Canceled      03/30/18 0639    03/30/18 0638  midazolam (VERSED) injection 1 mg  Every 5 Minutes PRN,   Status:  Discontinued      03/30/18 0639    03/30/18 0638  midazolam (VERSED) injection 2 mg  Every 5 Minutes PRN,   Status:  Discontinued      03/30/18 0639    03/30/18 0638  Vital Signs - Per Anesthesia Protocol  As Needed,   Status:  Canceled      03/30/18 0639    03/30/18 0638  sodium chloride 0.9 % flush 1-10 mL  As Needed,   Status:  Discontinued      03/30/18 0639    03/30/18 0638  buffered lidocaine syringe 0.5 mL  Once As Needed,   Status:  Discontinued      03/30/18 0639    03/30/18 0630  lactated ringers infusion 1,000 mL  Continuous,   Status:  Discontinued      03/30/18 0552    03/30/18 0600  lactated ringers infusion  Continuous,   Status:  Discontinued      03/30/18 0527    03/30/18 0600  ceFAZolin (ANCEF) 2 g/20ml IV PUSH syringe  Once      03/30/18 0527    03/30/18 0552  Insert Peripheral IV  Once,    Status:  Canceled      03/30/18 0552    03/30/18 0552  Maintain IV Access  Continuous,   Status:  Canceled      03/30/18 0552    03/30/18 0551  buffered lidocaine syringe 0.5 mL  Once As Needed      03/30/18 0552    03/30/18 0551  sodium chloride 0.9 % flush 3 mL  As Needed,   Status:  Discontinued      03/30/18 0552    03/30/18 0545  Type and screen  Once      03/30/18 0545    03/30/18 0528  Insert Peripheral IV  Once,   Status:  Canceled      03/30/18 0527    03/30/18 0528  Saline Lock & Maintain IV Access  Continuous,   Status:  Canceled      03/30/18 0527    03/30/18 0528  Inpatient Admission  Once      03/30/18 0527 03/30/18 0528  Follow Anesthesia Guidelines / Standing Orders  Once,   Status:  Canceled      03/30/18 0527    03/30/18 0528  Clip Operative Site  Once,   Status:  Canceled      03/30/18 0527    03/30/18 0528  Have Patient Void Prior to Procedure  Once,   Status:  Canceled      03/30/18 0527    03/30/18 0528  Obtain Informed Consent  Once,   Status:  Canceled      03/30/18 0527    03/30/18 0528  SCD (Sequential Compression Device) - Place on Patient in Pre-Op  Once,   Status:  Canceled      03/30/18 0527    03/30/18 0528  KENZIE Hose - Place on Patient in Pre-Op  Once,   Status:  Canceled      03/30/18 0527 03/30/18 0528  Verify NPO Status  Continuous,   Status:  Canceled      03/30/18 0527    03/30/18 0527  sodium chloride 0.9 % flush 1-10 mL  As Needed,   Status:  Discontinued      03/30/18 0527    Unscheduled  Apply Ice to Affected Area / Incision As Needed For Pain or Swelling  As Needed      03/30/18 1522    Unscheduled  Straight Cath  As Needed      03/30/18 1522    Unscheduled  Bladder Scan if Patient Unable to Void 4-6 Hours After Catheter Removal  As Needed      03/30/18 1522    Unscheduled  If Bladder Scan Volume is Less Than 350-500mL & Patient is Without Symptoms of Bladder Discomfort / Distention Monitor Every 1-2 Hours for Spontaneous Void  As Needed      03/30/18 1522     Unscheduled  Straight Cath Every 4-6 Hours As Needed If Patient is Unable to Void After 4-6 Hours, Bladder Scan Volume is Greater Than 350-500mL & Patient Has Symptoms of Bladder Discomfort / Distention  As Needed      03/30/18 1522    Unscheduled  Schedule / Prompt Voiding For Patients With Urinary Incontinence  As Needed      03/30/18 1522             Operative/Procedure Notes (last 72 hours) (Notes from 3/29/2018  6:24 PM through 4/1/2018  6:24 PM)      Samm Robb DO at 3/30/2018  6:57 AM        LUMBAR LAMINECTOMY ANTERIOR LUMBAR INTERBODY FUSION  Progress Note    Eric Caldera  3/30/2018    Pre-op Diagnosis:   Lumbar disc herniation with radiculopathy [M51.16]       Post-Op Diagnosis Codes:     * Lumbar disc herniation with radiculopathy [M51.16]    Procedure/CPT® Codes:      Procedure(s):  LUMBAR LAMINECTOMY ANTERIOR LUMBAR INTERBODY FUSION, Lumbar 5 to sacral 1 anterior interbody fusion and then posterior MIS L5 to S1 left decompression and instrumentation. O-arm  ANTERIOR LUMBAR EXPOSURE    Surgeon(s):  MD Samm Patel DO Griffin K Bicking, DO    Anesthesia: General    Staff:   Circulator: Westley Steele RN  Scrub Person: Neema Foster    Estimated Blood Loss: 50ml    Urine Voided: * No values recorded between 3/30/2018  7:23 AM and 3/30/2018  9:27 AM *    Specimens:                None    Complications: none      Samm Robb DO     Date: 3/30/2018  Time: 9:27 AM          Electronically signed by Samm Robb DO at 3/30/2018  9:27 AM     Samm Robb DO at 3/30/2018  6:57 AM        Eric Caldera  3/30/2018     PREOPERATIVE DIAGNOSIS: Lumbar disc herniation with radiculopathy [M51.16]     POSTOPERATIVE DIAGNOSIS: Post-Op Diagnosis Codes:     * Lumbar disc herniation with radiculopathy [M51.16]     PROCEDURE PERFORMED:  1.  Anterior lumbar interbody fusion of L5-S1 with instrumentation     SURGEON: Samm Robb DO   COSURGEON: Mehran Viera  MD Rufina    ANESTHESIA: General.    PREPARATION: Routine.    STAFF: Circulator: Westley Steele RN  Scrub Person: Neema JAMES Foster    ESTIMATED BLOOD LOSS: 50 ML    SPECIMENS: None    COMPLICATIONS: None    INDICATIONS: Eric Caldera is a 57 y.o. male who you are currently following for back pain.  He had a new onset of back pain and left leg numbness.  Imaging showed L5/S1 foraminal stenosis, and displacement of nerve roots.  He has undergone physical therapy and stopped smoking, but continues with pain and numbness.  He is scheduled for anterior lumbar interbody fusion on 3/30/18.  The indications, risks, and possible complications of the procedure were explained to the patient, who voiced understanding and wished to proceed with surgery.     PROCEDURE IN DETAIL: The patient was taken to the operating room and placed on the operating table in a supine position. After general anesthesia was obtained, the abdomen was prepped and draped in a sterile manner.  A transverse incision was then made in the left lower quadrant.  Careful dissection was made down through the subcutaneous tissues using the Bovie cautery to ensure hemostasis.  Any crossing veins were ligated with hemoclips.  The rectus fascia was identified and incised the Bovie cautery.  [place] clamps are placed on each side the rectus fascia and subfascial planes were established using the Bovie cautery and blunt finger dissection.  This was done in a cephalad and caudad direction.  Once the subfascial planes were established the attention was then turned to the left rectus muscle.  Blunt mobilization was made of the left rectus muscle including its blood supply medially and to the right.  Once it was fully mobilized the attention was then turned laterally to the peritoneal reflection.  Entrance into the retroperitoneal space was established with the use of a sponge stick and the Bovie cautery.  Once the psoas muscle and the left iliac vessels were  identified continued mobilization was made with my hand using a finger sweeping motion moving the peritoneal contents and sacral fat pad medially to the right.  Once it was fully mobilized the Brau-Jane retractor system was set up.  The retractor blades were set in place.  The left iliac vessels were carefully dissected free with the right angle and [place].  Once they were fully mobilized they were placed safely behind the retractor blades.  Next, the sacral vessels were carefully dissected free and taken down with medium hemoclips.  At this point full exposure was established of the L5-S1 disc space.  The next part of the case we dictated by Dr. Almaraz.  Upon completion of Dr. Almaraz's part of the case the wound bed was irrigated with antibiotic saline and hemostasis was observed.  The retractor blades were carefully taken out one at a time placing the structures back in the normal anatomic positions.  The rectus fascia was then closed with a #1 PDS in a running fashion.  The deep layers were closed with a 2-0 Vicryl in a running fashion.  The rest of the closure we dictated by Dr. Almaraz.  Sterile dressings were applied. The patient tolerated the procedure well. Sponge and needle counts were correct. The patient was then flipped for the second part of the procedure.  Samm Robb DO  Date: 3/30/2018 Time: 9:27 AM     CC:YOLANDA Cordero      Electronically signed by Samm Robb DO at 3/30/2018  9:34 AM     Mehran Almaraz MD at 3/30/2018  6:57 AM        LUMBAR LAMINECTOMY ANTERIOR LUMBAR INTERBODY FUSION  Progress Note    Eric Caldera  3/30/2018    Pre-op Diagnosis:   Lumbar disc herniation with radiculopathy [M51.16]       Post-Op Diagnosis Codes:     * Lumbar disc herniation with radiculopathy [M51.16]    Procedure/CPT® Codes:      Procedure(s):  LUMBAR LAMINECTOMY ANTERIOR LUMBAR INTERBODY FUSION, Lumbar 5 to sacral 1 anterior interbody fusion and then posterior MIS L5  to S1 left decompression and instrumentation. O-arm  ANTERIOR LUMBAR EXPOSURE    Surgeon(s):  DO Mehran Holder MD    Anesthesia: General    Staff:   Circulator: Westley Steele RN; Mary Gunderson RN  Scrub Person: Neema Foster  Vendor Representative: Cristel Srivastava  Assistant: Yanna Garcia    Estimated Blood Loss: 100ml    Urine Voided: * No values recorded between 3/30/2018  7:23 AM and 3/30/2018  1:17 PM *    Specimens:                None      Drains:      Findings: good decompression and distraction of L5/S1.  Left L5/S1 foramen decompressed.  Small left disc hernaition removed. Monitoring stable    Complications: None.        Mehran Almaraz MD     Date: 3/30/2018  Time: 1:17 PM          Electronically signed by Mehran Almaraz MD at 3/30/2018  1:18 PM          Physician Progress Notes (last 72 hours) (Notes from 3/29/2018  6:24 PM through 4/1/2018  6:24 PM)      Mehran Almaraz MD at 4/1/2018 11:34 AM          Neurosurgery Daily Progress Note    Assessment:   Eric Caldera is a 57 y.o. yo with a significant PMH of htn and depression who presented with Nhan S1 and left L5 radiculopathy.  Imaging revealed recurrent L5/S1 disc herniation.      DDX:   Patient Active Problem List   Diagnosis   • Degeneration of lumbar intervertebral disc   • Pain in both lower extremities   • Numbness in feet   • Claudication of both lower extremities   • BMI 28.0-28.9,adult   • Tobacco non-user   • Lumbar disc herniation with radiculopathy       Plan:   Neuro: POD#1 from L5/S1 ALIF and Posterior instrumentation and left L5 hemilaminectomy  CV: DOREEN  Pulm: RA  :  Davis out  FEN: Shannan PO, HLIV  GI: Awaiting BM  ID: Post op Abx for 23 hours  Heme:  DVT prophylaxis begins today  Pain: Gabapentin, valium scheduled.  Increase oxycodone to 15 and schedule tylenol  Dispo: PT/OT, anticipate 4-5 days due to home situation      Chief complaint:   Back  "pain    Subjective  Complete resolution of leg symptoms.     Temp:  [97.4 °F (36.3 °C)-100 °F (37.8 °C)] 98.5 °F (36.9 °C)  Heart Rate:  [83-98] 87  Resp:  [18-20] 18  BP: (105-126)/(52-71) 126/67    Objective:  Vital signs: (most recent): Blood pressure 126/67, pulse 87, temperature 98.5 °F (36.9 °C), temperature source Oral, resp. rate 18, height 176.5 cm (69.49\"), weight 88.5 kg (195 lb 1.7 oz), SpO2 95 %.        Neurologic Exam     Mental Status   Oriented to person, place, and time.   Speech: speech is normal   Level of consciousness: alert    Cranial Nerves     CN III, IV, VI   Pupils are equal, round, and reactive to light.  Extraocular motions are normal.     CN V   Facial sensation intact.     CN VII   Facial expression full, symmetric.     Motor Exam   Right arm pronator drift: absent  Left arm pronator drift: absent    Strength   Right deltoid: 5/5  Left deltoid: 5/5  Right biceps: 5/5  Left biceps: 5/5  Right triceps: 5/5  Left triceps: 5/5  Right iliopsoas: 5/5  Left iliopsoas: 5/5  Right quadriceps: 5/5  Left quadriceps: 5/5  Right gastroc: 5/5  Left gastroc: 5/5    Sensory Exam   Light touch normal.     Incision CDI    Drains:   [REMOVED] Urethral Catheter Non-latex;Silicone;Double-lumen 16 Fr. (Removed)   Removed 03/30/18 2000   Daily Indications Selected surgeries ( tract, abdomen) 3/30/2018  3:20 PM   Site Assessment Clean;Skin intact 3/30/2018  3:20 PM   Collection Container Standard drainage bag 3/30/2018  3:20 PM   Securement Method Securing device 3/30/2018  3:20 PM   Output (mL) 1200 mL 3/30/2018  7:54 PM       Active Problems:    Lumbar disc herniation with radiculopathy      Lab Results (last 24 hours)     ** No results found for the last 24 hours. **          [id number]  Mehran Almaraz MD      Electronically signed by Mehran Almaraz MD at 4/1/2018 11:38 AM     Mehran Almaraz MD at 3/31/2018  9:17 AM          Neurosurgery Daily Progress Note    Assessment:   Eric RAMIREZ" Werner is a 57 y.o. yo with a significant PMH of htn and depression who presented with Nhan S1 and left L5 radiculopathy.  Imaging revealed recurrent L5/S1 disc herniation.      DDX:   Patient Active Problem List   Diagnosis   • Degeneration of lumbar intervertebral disc   • Pain in both lower extremities   • Numbness in feet   • Claudication of both lower extremities   • BMI 28.0-28.9,adult   • Tobacco non-user   • Lumbar disc herniation with radiculopathy       Plan:   Neuro: POD#1 from L5/S1 ALIF and Posterior instrumentation and left L5 hemilaminectomy  CV: DOREEN  Pulm: RA  :  Davis out  FEN: Shannan PO, HLIV  GI: Awaiting BM  ID: Post op Abx for 23 hours  Heme:  DVT prophylaxis begins today  Pain: DC PCA and transition to oral pain meds.  Can not tolerate NSAIDs  Dispo: PT/OT, anticipate 4-5 days due to home situation      Chief complaint:   Back pain    Subjective  Complete resolution of leg symptoms.     Temp:  [97.7 °F (36.5 °C)-98.9 °F (37.2 °C)] 98.6 °F (37 °C)  Heart Rate:  [] 88  Resp:  [14-21] 20  BP: (101-138)/(36-88) 115/74    Objective    Neurologic Exam     Mental Status   Oriented to person, place, and time.   Speech: speech is normal   Level of consciousness: alert    Cranial Nerves     CN III, IV, VI   Pupils are equal, round, and reactive to light.  Extraocular motions are normal.     CN V   Facial sensation intact.     CN VII   Facial expression full, symmetric.     Motor Exam   Right arm pronator drift: absent  Left arm pronator drift: absent    Strength   Right deltoid: 5/5  Left deltoid: 5/5  Right biceps: 5/5  Left biceps: 5/5  Right triceps: 5/5  Left triceps: 5/5  Right iliopsoas: 5/5  Left iliopsoas: 5/5  Right quadriceps: 5/5  Left quadriceps: 5/5  Right gastroc: 5/5  Left gastroc: 5/5    Sensory Exam   Light touch normal.     Incision CDI    Drains:   [REMOVED] Urethral Catheter Non-latex;Silicone;Double-lumen 16 Fr. (Removed)   Removed 03/30/18 2000   Daily Indications Selected  surgeries ( tract, abdomen) 3/30/2018  3:20 PM   Site Assessment Clean;Skin intact 3/30/2018  3:20 PM   Collection Container Standard drainage bag 3/30/2018  3:20 PM   Securement Method Securing device 3/30/2018  3:20 PM   Output (mL) 1200 mL 3/30/2018  7:54 PM       Active Problems:    Lumbar disc herniation with radiculopathy      Lab Results (last 24 hours)     ** No results found for the last 24 hours. **          [id number]  Mehran Almaraz MD      Electronically signed by Mehran Almaraz MD at 3/31/2018  9:21 AM       Consult Notes (last 72 hours) (Notes from 3/29/2018  6:24 PM through 4/1/2018  6:24 PM)     No notes of this type exist for this encounter.

## 2018-04-01 NOTE — PLAN OF CARE
Problem: Patient Care Overview  Goal: Plan of Care Review  Outcome: Ongoing (interventions implemented as appropriate)   04/01/18 8400   Coping/Psychosocial   Plan of Care Reviewed With patient   Plan of Care Review   Progress improving   OTHER   Outcome Summary VSS. Pain managed throughout shift. Able to ambulate in hallway with PT. Pain medicine adjusted by physician. Refused Teds     Goal: Individualization and Mutuality  Outcome: Ongoing (interventions implemented as appropriate)    Goal: Discharge Needs Assessment  Outcome: Ongoing (interventions implemented as appropriate)    Goal: Interprofessional Rounds/Family Conf  Outcome: Ongoing (interventions implemented as appropriate)      Problem: Laminectomy/Foraminotomy/Discectomy (Adult)  Goal: Signs and Symptoms of Listed Potential Problems Will be Absent, Minimized or Managed (Laminectomy/Foraminotomy/Discectomy)  Outcome: Ongoing (interventions implemented as appropriate)      Problem: Fall Risk (Adult)  Goal: Absence of Fall  Outcome: Ongoing (interventions implemented as appropriate)

## 2018-04-01 NOTE — THERAPY TREATMENT NOTE
Acute Care - Physical Therapy Treatment Note  [facility]     Patient Name: Eric Caldera  : 1960  MRN: [id number]  Today's Date: 2018  Onset of Illness/Injury or Date of Surgery: 18  Date of Referral to PT: 18  Referring Physician: Dr. Almaraz    Admit Date: 3/30/2018    Visit Dx:    ICD-10-CM ICD-9-CM   1. Lumbar disc herniation with radiculopathy M51.16 722.10   2. Decreased activities of daily living (ADL) Z78.9 V49.89   3. Impaired mobility Z74.09 799.89     Patient Active Problem List   Diagnosis   • Degeneration of lumbar intervertebral disc   • Pain in both lower extremities   • Numbness in feet   • Claudication of both lower extremities   • BMI 28.0-28.9,adult   • Tobacco non-user   • Lumbar disc herniation with radiculopathy       Therapy Treatment    Therapy Treatment / Health Promotion    Treatment Time/Intention  Discipline: physical therapy assistant (18 1402 : Ximena Delcid PTA)  Document Type: therapy note (daily note) (18 1402 : Ximena Delcid PTA)  Subjective Information: complains of, pain (18 1402 : Ximena Delcid PTA)  Mode of Treatment: physical therapy (18 1402 : Ximena Delcid PTA)  Patient Effort: good (18 1402 : Ximena Delcid PTA)  Existing Precautions/Restrictions: brace worn when out of bed, fall, spinal (18 1402 : Ximena Delcid PTA)  Treatment Considerations/Comments: pn meds prior to tx (18 1402 : Ximena Delcid PTA)  Plan of Care Review  Plan of Care Reviewed With: patient (18 0955 : Ximena Delcid PTA)    Vitals/Pain/Safety  Pain Scale: Numbers Pre/Post-Treatment  Pain Scale: Numbers, Pretreatment: 9/10 (18 1402 : Ximena Delcid PTA)  Pain Location: back (18 1402 : Ximena Delcid PTA)  Pain Intervention(s): Ambulation/increased activity (18 0955 : Ximena Delcid PTA)  Pain Scale: Word Pre/Post-Treatment  Pain Location: back (18 1402 : Ximena Delcid, PTA)  Pain  Intervention(s): Ambulation/increased activity (04/01/18 0955 : Ximena Delcid PTA)  Pain Scale: FACES Pre/Post-Treatment  Pain Location: back (04/01/18 1402 : Ximena Delcid PTA)  Pain Intervention(s): Ambulation/increased activity (04/01/18 0955 : Ximena Delcid PTA)  Positioning and Restraints  Pre-Treatment Position: in bed (04/01/18 1402 : Ximena Delcid PTA)  Post Treatment Position: bed (04/01/18 1402 : Ximena Delcid PTA)    Mobility,ADL,Motor, Modality  Bed Mobility Assessment/Treatment  Sidelying-Sit [place] (Bed Mobility): verbal cues, contact guard (04/01/18 1402 : Ximena Delcid PTA)  Sit-Sidelying [place] (Bed Mobility): verbal cues, contact guard (04/01/18 1402 : Ximena Delcid PTA)  Comment (Bed Mobility): cueing for bed mobility,does not use proper or safe mechanics (04/01/18 1402 : Ximena Delcid PTA)  Sit-Stand Transfer  Sit-Stand [place] (Transfers): verbal cues, minimum assist (75% patient effort) (04/01/18 1402 : Ximena Delcid PTA)  Stand-Sit Transfer  Stand-Sit [place] (Transfers): verbal cues, contact guard (04/01/18 1402 : Ximena Delcid PTA)  Gait/Stairs Assessment/Training  Gait/Stairs Assessment/Training: gait/ambulation independence (04/01/18 1402 : Ximena Delcid PTA)  [place] Level (Gait): verbal cues, contact guard, minimum assist (75% patient effort) (04/01/18 1402 : Ximena Delcid PTA)  Assistive Device (Gait): walker, front-wheeled (04/01/18 1402 : Ximena Delcid PTA)  Distance in Feet (Gait): 100 (04/01/18 1402 : Ximena Delcid PTA)  Deviations/Abnormal Patterns (Gait): antalgic, gait speed decreased, stride length decreased, jayson decreased (04/01/18 1402 : Ximena Delcid PTA)                 ROM/MMT             Sensory, Edema, Orthotics     Spinal Orthosis Management  Type (Spinal Orthosis): LSO (lumbar sacral orthosis) (04/01/18 1402 : Ximena L Delcid, PTA)  Wearing Schedule (Spinal Orthosis): wear when out of bed only (04/01/18 1402 :  Ximena Delcid, [facility])    Cognition, Communication, Swallow       Outcome Summary               PT Rehab Goals     Row Name 03/31/18 1500 03/31/18 0830          Bed Mobility Goal 1 (PT)    Activity/Assistive Device (Bed Mobility Goal 1, PT)  -- bed mobility activities, all  -TB     [place] Level/Cues Needed (Bed Mobility Goal 1, PT)  -- independent  -TB     Time Frame (Bed Mobility Goal 1, PT)  -- by discharge  -TB     Barriers (Bed Mobility Goal 1, PT)  -- pain  -TB     Progress/Outcomes (Bed Mobility Goal 1, PT)  -- good progress toward goal  -TB        Transfer Goal 1 (PT)    Activity/Assistive Device (Transfer Goal 1, PT)  -- transfers, all  -TB     [place] Level/Cues Needed (Transfer Goal 1, PT)  -- supervision required  -TB     Time Frame (Transfer Goal 1, PT)  -- by discharge  -TB     Barriers (Transfers Goal 1, PT)  -- pain  -TB     Progress/Outcome (Transfer Goal 1, PT)  -- good progress toward goal  -TB        Gait Training Goal 1 (PT)    Activity/Assistive Device (Gait Training Goal 1, PT) gait (walking locomotion)  -TB gait (walking locomotion)  -TB     [place] Level (Gait Training Goal 1, PT) supervision required  -TB supervision required  -TB     Distance (Gait Goal 1, PT) 200  -  -TB     Time Frame (Gait Training Goal 1, PT) by discharge  -TB by discharge  -TB     Barriers (Gait Training Goal 1, PT) pain  -TB pain  -TB     Progress/Outcome (Gait Training Goal 1, PT) good progress toward goal  -TB good progress toward goal  -TB        Patient Education Goal (PT)    Activity (Patient Education Goal, PT)  -- Independent with don/doff/use of LSO  -TB     [place]/Cues/Accuracy (Memory Goal 2, PT)  -- demonstrates adequately  -TB     Time Frame (Patient Education Goal, PT)  -- by discharge  -TB     Barriers (Patient Education Goal, PT)  -- pain  -TB     Progress/Outcome (Patient Education Goal, PT)  -- good progress toward goal  -TB       User Key  (r) = Recorded By, (t) = Taken By,  (c) = Cosigned By    Initials Name Provider Type    TB Dilip Avila, PT Physical Therapist          Physical Therapy Education     Title: PT OT SLP Therapies (Active)     Topic: Physical Therapy (Active)     Point: Mobility training (Active)    Learning Progress Summary     Learner Status Readiness Method Response Comment Documented by    Patient Active Acceptance E NR log rolling, don/doffing LSO KJ 04/01/18 1030     Active Acceptance E NR  TB 03/31/18 0912          Point: Home exercise program (Active)    Learning Progress Summary     Learner Status Readiness Method Response Comment Documented by    Patient Active Acceptance E NR log rolling, don/doffing LSO KJ 04/01/18 1030     Active Acceptance E NR  TB 03/31/18 0912          Point: Body mechanics (Active)    Learning Progress Summary     Learner Status Readiness Method Response Comment Documented by    Patient Active Acceptance E NR log rolling, don/doffing LSO KJ 04/01/18 1030     Active Acceptance E NR  TB 03/31/18 0912          Point: Precautions (Active)    Learning Progress Summary     Learner Status Readiness Method Response Comment Documented by    Patient Active Acceptance E NR log rolling, don/doffing LSO KJ 04/01/18 1030     Active Acceptance E NR  TB 03/31/18 0912                      User Key     Initials Effective Dates Name Provider Type Discipline    TB 08/02/16 -  Dilip Avila, PT Physical Therapist PT     08/02/16 -  Ximena Delcid, PTA Physical Therapy Assistant PT                    PT Recommendation and Plan     Plan of Care Reviewed With: patient  Progress: improving  Outcome Summary: PT tx completed. Pt sitting edge of bed. C/O low back pn rates 10/10. Shaking he was hurting so bad. Transfers CG, amb 95' with MinCG assist due to safety. Legs trembling with pn. S bed mobility but cueing for technique.           Outcome Measures     Row Name 03/31/18 1500 03/31/18 0915 03/31/18 0830       How much help from another person do you  currently need...    Turning from your back to your side while in flat bed without using bedrails? 3  -TB  -- 3  -TB    Moving from lying on back to sitting on the side of a flat bed without bedrails? 3  -TB  -- 3  -TB    Moving to and from a bed to a chair (including a wheelchair)? 3  -TB  -- 3  -TB    Standing up from a chair using your arms (e.g., wheelchair, bedside chair)? 3  -TB  -- 3  -TB    Climbing 3-5 steps with a railing? 3  -TB  -- 3  -TB    To walk in hospital room? 3  -TB  -- 3  -TB    AM-PAC 6 Clicks Score 18  -TB  -- 18  -TB       How much help from another is currently needed...    Putting on and taking off regular lower body clothing?  -- 2  -AC  --    Bathing (including washing, rinsing, and drying)  -- 2  -AC  --    Toileting (which includes using toilet bed pan or urinal)  -- 3  -AC  --    Putting on and taking off regular upper body clothing  -- 2  -AC  --    Taking care of personal grooming (such as brushing teeth)  -- 3  -AC  --    Eating meals  -- 4  -AC  --    Score  -- 16  -AC  --       Functional Assessment    Outcome Measure Options  -- AM-PAC 6 Clicks Daily Activity (OT)  -AC AM-PAC 6 Clicks Basic Mobility (PT)  -TB      User Key  (r) = Recorded By, (t) = Taken By, (c) = Cosigned By    Initials Name Provider Type    TB Dilip Avila, PT Physical Therapist    AC Ariel Villarreal, OTR/L Occupational Therapist           Time Calculation:         PT Charges     Row Name 04/01/18 0955             Time Calculation    Start Time 0955  -KJ      Stop Time 1013  -KJ      Time Calculation (min) 18 min  -KJ      PT Received On 04/01/18  -KJ      PT Goal Re-Cert Due Date 04/10/18  -KJ         Time Calculation- PT    Total Timed Code Minutes- PT 18 minute(s)  -KJ        User Key  (r) = Recorded By, (t) = Taken By, (c) = Cosigned By    Initials Name Provider Type    RASTA Delcid, PTA Physical Therapy Assistant          Therapy Charges for Today     Code Description Service Date Service Provider  Modifiers Qty    [id number] HC GAIT TRAINING EA 15 MIN 4/1/2018 Ximena Delcid, AMADEO GP, KX 1          PT G-Codes  PT Professional Judgement Used?: Yes  Outcome Measure Options: AM-PAC 6 Clicks Daily Activity (OT)  Score: 18  Functional Limitation: Mobility: Walking and moving around  Mobility: Walking and Moving Around Current Status (): At least 40 percent but less than 60 percent impaired, limited or restricted  Mobility: Walking and Moving Around Goal Status (): At least 20 percent but less than 40 percent impaired, limited or restricted    Ximena Delcid PTA  4/1/2018

## 2018-04-02 ENCOUNTER — APPOINTMENT (OUTPATIENT)
Dept: GENERAL RADIOLOGY | Facility: HOSPITAL | Age: 58
End: 2018-04-02

## 2018-04-02 PROCEDURE — 74018 RADEX ABDOMEN 1 VIEW: CPT

## 2018-04-02 PROCEDURE — 97110 THERAPEUTIC EXERCISES: CPT

## 2018-04-02 PROCEDURE — 97535 SELF CARE MNGMENT TRAINING: CPT

## 2018-04-02 PROCEDURE — 99024 POSTOP FOLLOW-UP VISIT: CPT | Performed by: NEUROLOGICAL SURGERY

## 2018-04-02 PROCEDURE — 97116 GAIT TRAINING THERAPY: CPT

## 2018-04-02 PROCEDURE — 25010000002 HEPARIN (PORCINE) PER 1000 UNITS: Performed by: NEUROLOGICAL SURGERY

## 2018-04-02 RX ORDER — MAGNESIUM CARB/ALUMINUM HYDROX 105-160MG
150 TABLET,CHEWABLE ORAL ONCE
Status: COMPLETED | OUTPATIENT
Start: 2018-04-02 | End: 2018-04-02

## 2018-04-02 RX ADMIN — GABAPENTIN 100 MG: 100 CAPSULE ORAL at 14:11

## 2018-04-02 RX ADMIN — SERTRALINE 150 MG: 100 TABLET, FILM COATED ORAL at 07:45

## 2018-04-02 RX ADMIN — GABAPENTIN 100 MG: 100 CAPSULE ORAL at 21:13

## 2018-04-02 RX ADMIN — OXYCODONE HYDROCHLORIDE 15 MG: 5 TABLET ORAL at 23:47

## 2018-04-02 RX ADMIN — HEPARIN SODIUM 5000 UNITS: 5000 INJECTION INTRAVENOUS; SUBCUTANEOUS at 20:17

## 2018-04-02 RX ADMIN — OXYCODONE HYDROCHLORIDE 15 MG: 5 TABLET ORAL at 04:59

## 2018-04-02 RX ADMIN — OXYCODONE HYDROCHLORIDE 15 MG: 5 TABLET ORAL at 10:01

## 2018-04-02 RX ADMIN — ACETAMINOPHEN 650 MG: 325 TABLET, FILM COATED ORAL at 20:17

## 2018-04-02 RX ADMIN — ACETAMINOPHEN 650 MG: 325 TABLET, FILM COATED ORAL at 17:20

## 2018-04-02 RX ADMIN — ACETAMINOPHEN 650 MG: 325 TABLET, FILM COATED ORAL at 07:44

## 2018-04-02 RX ADMIN — DOCUSATE SODIUM AND SENNOSIDES 2 TABLET: 8.6; 5 TABLET, FILM COATED ORAL at 07:44

## 2018-04-02 RX ADMIN — ACETAMINOPHEN 650 MG: 325 TABLET, FILM COATED ORAL at 12:07

## 2018-04-02 RX ADMIN — Medication 150 ML: at 12:19

## 2018-04-02 RX ADMIN — DIAZEPAM 5 MG: 5 TABLET ORAL at 21:13

## 2018-04-02 RX ADMIN — DOCUSATE SODIUM AND SENNOSIDES 2 TABLET: 8.6; 5 TABLET, FILM COATED ORAL at 20:17

## 2018-04-02 RX ADMIN — HEPARIN SODIUM 5000 UNITS: 5000 INJECTION INTRAVENOUS; SUBCUTANEOUS at 07:45

## 2018-04-02 RX ADMIN — OXYCODONE HYDROCHLORIDE 15 MG: 5 TABLET ORAL at 18:26

## 2018-04-02 RX ADMIN — GABAPENTIN 100 MG: 100 CAPSULE ORAL at 05:00

## 2018-04-02 RX ADMIN — DIAZEPAM 5 MG: 5 TABLET ORAL at 05:00

## 2018-04-02 RX ADMIN — LEVOTHYROXINE SODIUM 100 MCG: 100 TABLET ORAL at 05:00

## 2018-04-02 RX ADMIN — DIAZEPAM 5 MG: 5 TABLET ORAL at 14:11

## 2018-04-02 NOTE — THERAPY TREATMENT NOTE
"Acute Care - Occupational Therapy Treatment Note  [facility]     Patient Name: Eric Caldera  : 1960  MRN: [id number]  Today's Date: 2018  Onset of Illness/Injury or Date of Surgery: 18  Date of Referral to OT: 18  Referring Physician: Dr. Almaraz    Admit Date: 3/30/2018       ICD-10-CM ICD-9-CM   1. Lumbar disc herniation with radiculopathy M51.16 722.10   2. Decreased activities of daily living (ADL) Z78.9 V49.89   3. Impaired mobility Z74.09 799.89     Patient Active Problem List   Diagnosis   • Degeneration of lumbar intervertebral disc   • Pain in both lower extremities   • Numbness in feet   • Claudication of both lower extremities   • BMI 28.0-28.9,adult   • Tobacco non-user   • Lumbar disc herniation with radiculopathy     Past Medical History:   Diagnosis Date   • Arthritis    • Depression    • Hypertension    • Kidney stones    • Migraine    • Thyroid disease      Past Surgical History:   Procedure Laterality Date   • ARM TENDON REPAIR Left     \"Rebuilding\" of tendons   • LEG SURGERY Right 2009    Removal of bone tumor   • LUMBAR SPINE SURGERY         Therapy Treatment    Therapy Treatment / Health Promotion    Treatment Time/Intention  Discipline: occupational therapy assistant (18 0905 : YEN Stallings)  Document Type: therapy note (daily note) (18 0905 : YEN Stallings)  Subjective Information: complains of, pain (18 0905 : YEN Stallings)  Patient Effort: good (18 0905 : YEN Stallings)  Existing Precautions/Restrictions: brace worn when out of bed, fall, spinal (18 0905 : YEN Stallings)  Plan of Care Review  Plan of Care Reviewed With: patient (18 1027 : Saniya N Anusha, RODRÍGUEZ/L)    Vitals/Pain/Safety  Pain Assessment  Additional Documentation: Pain Scale: Numbers Pre/Post-Treatment (Group) (18 0905 : YEN Stallings)  Pain Scale: Numbers " Pre/Post-Treatment  Pain Scale: Numbers, Pretreatment: 0/10 - no pain (04/02/18 0905 : Saniya Tavares RODRÍGUEZ/L)  Positioning and Restraints  Pre-Treatment Position: sitting in chair/recliner (04/02/18 0905 : Saniya Tavares, RODRÍGUEZ/L)  Post Treatment Position: bed (04/02/18 0905 : Saniya Tavares RODRÍGUEZ/L)  In Bed: sitting EOB, call light within reach, encouraged to call for assist, with family/caregiver, side rails up x2 (04/02/18 0905 : Saniya Tavares RODRÍGUEZ/L)    Mobility,ADL,Motor, Modality  Transfer Assessment/Treatment  Transfer Assessment/Treatment: sit-stand transfer, stand-sit transfer, toilet transfer (04/02/18 0905 : Saniya Tavares RODRÍGUEZ/L)  Sit-Stand Transfer  Sit-Stand Gonzales (Transfers): stand by assist, verbal cues (04/02/18 0905 : Saniya Tavares RODRÍGUEZ/L)  Assistive Device (Sit-Stand Transfers): walker, front-wheeled (04/02/18 0905 : Saniya Tavares, RODRÍGUEZ/L)  Stand-Sit Transfer  Stand-Sit Gonzales (Transfers): stand by assist, verbal cues (04/02/18 0905 : Saniya Tavares, RODRÍGUEZ/L)  Assistive Device (Stand-Sit Transfers): walker, front-wheeled (04/02/18 0905 : Saniya Tavares, RODRÍGUEZ/L)  Toilet Transfer  Type (Toilet Transfer): sit-stand, stand-sit (04/02/18 0905 : Saniya Tavares, RODRÍGUEZ/L)  Gonzales Level (Toilet Transfer): stand by assist (04/02/18 0905 : Saniya Tavares RODRÍGUEZ/L)  Assistive Device (Toilet Transfer): commode, grab bars/safety frame (04/02/18 0905 : Saniya Tavares RODRÍGUEZ/L)  ADL Assessment/Intervention  BADL Assessment/Intervention: upper body dressing, toileting (04/02/18 0905 : ANNE Stallings/L)  Upper Body Dressing Assessment/Training  Upper Body Dressing Gonzales Level: doff, minimum assist (75% patient effort), verbal cues (LSO) (04/02/18 0905 : ANNE Stallings/L)  Upper Body Dressing Position: unsupported standing (04/02/18 0905 : ANNE Stallings/L)  Toileting  Assessment/Training  Benton Level (Toileting): supervision (04/02/18 0905 : YEN Stallings)  Assistive Devices (Toileting): commode (04/02/18 0905 : ANNE Stallings/L)  Toileting Position: unsupported sitting, supported standing (04/02/18 0905 : ANNE Stallings/JAMES)              ROM/MMT             Sensory, Edema, Orthotics     Spinal Orthosis Management  Orthosis Training (Spinal Orthosis): donning/doffing orthosis, purpose/goals of orthosis, patient, activity limitations/precautions (04/02/18 0905 : ANNE Stallings/L)    Cognition, Communication, Swallow  Cognitive Assessment/Intervention- PT/OT  Personal Safety Interventions: fall prevention program maintained, gait belt, nonskid shoes/slippers when out of bed (04/02/18 0905 : ANNE Stallings/L)    Outcome Summary           OT Rehab Goals     Row Name 03/31/18 0800             Transfer Goal 1 (OT)    Activity/Assistive Device (Transfer Goal 1, OT) tub  -AC      Benton Level/Cues Needed (Transfer Goal 1, OT) contact guard assist  -AC      Time Frame (Transfer Goal 1, OT) long term goal (LTG);10 days  -AC      Progress/Outcome (Transfer Goal 1, OT) goal ongoing  -AC         Bathing Goal 1 (OT)    Activity/Assistive Device (Bathing Goal 1, OT) lower body bathing  -AC      Benton Level/Cues Needed (Bathing Goal 1, OT) minimum assist (75% or more patient effort)  -AC      Time Frame (Bathing Goal 1, OT) long term goal (LTG);10 days  -AC      Progress/Outcomes (Bathing Goal 1, OT) goal ongoing  -AC         Dressing Goal 1 (OT)    Activity/Assistive Device (Dressing Goal 1, OT) lower body dressing  -AC      Benton/Cues Needed (Dressing Goal 1, OT) minimum assist (75% or more patient effort)  -AC      Time Frame (Dressing Goal 1, OT) long term goal (LTG);10 days  -AC      Progress/Outcome (Dressing Goal 1, OT) goal ongoing  -AC         Patient Education Goal (OT)    Activity (Patient Education  Goal, OT) spinal precautions, LSO wear/care  -AC      Gila/Cues/Accuracy (Memory Goal 2, OT) demonstrates adequately;independent;verbalizes understanding  -AC      Time Frame (Patient Education Goal, OT) long term goal (LTG);10 days  -AC      Progress/Outcome (Patient Education Goal, OT) goal ongoing  -AC        User Key  (r) = Recorded By, (t) = Taken By, (c) = Cosigned By    Initials Name Provider Type    AC Ariel Villarreal, OTR/L Occupational Therapist        Occupational Therapy Education     Title: PT OT SLP Therapies (Active)     Topic: Occupational Therapy (Done)     Point: ADL training (Done)     Description: Instruct learner(s) on proper safety adaptation and remediation techniques during self care or transfers.   Instruct in proper use of assistive devices.   Learning Progress Summary     Learner Status Readiness Method Response Comment Documented by    Patient Done Acceptance E VU back precautions, LSO, transfers, DME TS 04/02/18 1027     Done Acceptance E VU spinal precautions, OT POC, pain mgmt via deep breathing AC 03/31/18 0916          Point: Precautions (Done)     Description: Instruct learner(s) on prescribed precautions during self-care and functional transfers.   Learning Progress Summary     Learner Status Readiness Method Response Comment Documented by    Patient Done Acceptance E VU back precautions, LSO, transfers, DME TS 04/02/18 1027     Done Acceptance E VU spinal precautions, OT POC, pain mgmt via deep breathing AC 03/31/18 0916          Point: Body mechanics (Done)     Description: Instruct learner(s) on proper positioning and spine alignment during self-care, functional mobility activities and/or exercises.   Learning Progress Summary     Learner Status Readiness Method Response Comment Documented by    Patient Done Acceptance E VU spinal precautions, OT POC, pain mgmt via deep breathing AC 03/31/18 0916                      User Key     Initials Effective Dates Name Provider  Type Discipline    AC 06/22/15 -  Ariel Villarreal, OTR/L Occupational Therapist OT    TS 08/02/16 -  ANNE Stallings/L Occupational Therapy Assistant OT                  OT Recommendation and Plan     Plan of Care Review  Plan of Care Reviewed With: patient  Plan of Care Reviewed With: patient  Outcome Summary: Pt transfers with SBA. Pt ambulates with RW with SBA. Pt min A for LSO. Pt S for toileting. Pt would benefit from acute rehab or home with HH. Pt would benefit from BSC at discharge. Continue OT POC         Outcome Measures     Row Name 04/02/18 1000 04/02/18 0800 03/31/18 1500       How much help from another person do you currently need...    Turning from your back to your side while in flat bed without using bedrails?  -- 3  -CW 3  -TB    Moving from lying on back to sitting on the side of a flat bed without bedrails?  -- 3  -CW 3  -TB    Moving to and from a bed to a chair (including a wheelchair)?  -- 3  -CW 3  -TB    Standing up from a chair using your arms (e.g., wheelchair, bedside chair)?  -- 3  -CW 3  -TB    Climbing 3-5 steps with a railing?  -- 3  -CW 3  -TB    To walk in hospital room?  -- 3  -CW 3  -TB    AM-PAC 6 Clicks Score  -- 18  -CW 18  -TB       How much help from another is currently needed...    Putting on and taking off regular lower body clothing? 2  -TS  --  --    Bathing (including washing, rinsing, and drying) 2  -TS  --  --    Toileting (which includes using toilet bed pan or urinal) 3  -TS  --  --    Putting on and taking off regular upper body clothing 3  -TS  --  --    Taking care of personal grooming (such as brushing teeth) 3  -TS  --  --    Eating meals 4  -TS  --  --    Score 17  -TS  --  --       Functional Assessment    Outcome Measure Options AM-PAC 6 Clicks Daily Activity (OT)  -TS AM-PAC 6 Clicks Basic Mobility (PT)  -CW  --    Row Name 03/31/18 0915 03/31/18 0830          How much help from another person do you currently need...    Turning from your back to  your side while in flat bed without using bedrails?  -- 3  -TB     Moving from lying on back to sitting on the side of a flat bed without bedrails?  -- 3  -TB     Moving to and from a bed to a chair (including a wheelchair)?  -- 3  -TB     Standing up from a chair using your arms (e.g., wheelchair, bedside chair)?  -- 3  -TB     Climbing 3-5 steps with a railing?  -- 3  -TB     To walk in hospital room?  -- 3  -TB     AM-PAC 6 Clicks Score  -- 18  -TB        How much help from another is currently needed...    Putting on and taking off regular lower body clothing? 2  -AC  --     Bathing (including washing, rinsing, and drying) 2  -AC  --     Toileting (which includes using toilet bed pan or urinal) 3  -AC  --     Putting on and taking off regular upper body clothing 2  -AC  --     Taking care of personal grooming (such as brushing teeth) 3  -AC  --     Eating meals 4  -AC  --     Score 16  -AC  --        Functional Assessment    Outcome Measure Options AM-PAC 6 Clicks Daily Activity (OT)  -AC AM-PAC 6 Clicks Basic Mobility (PT)  -TB       User Key  (r) = Recorded By, (t) = Taken By, (c) = Cosigned By    Initials Name Provider Type    TB Dilip Avila, PT Physical Therapist    AC Ariel Villarreal, OTR/L Occupational Therapist    TS MAURI StallingsA/L Occupational Therapy Assistant     Sandee Campoverde, PTA Physical Therapy Assistant           Time Calculation:         Time Calculation- OT     Row Name 04/02/18 1029             Time Calculation- OT    OT Start Time 0905  -TS      OT Stop Time 0935  -TS      OT Time Calculation (min) 30 min  -TS      Total Timed Code Minutes- OT 30 minute(s)  -TS      OT Received On 04/02/18  -TS        User Key  (r) = Recorded By, (t) = Taken By, (c) = Cosigned By    Initials Name Provider Type    TS Saniya Tavares RODRÍGUEZ/L Occupational Therapy Assistant           Therapy Charges for Today     Code Description Service Date Service Provider Modifiers Qty     [id number]  OT SELF CARE/MGMT/TRAIN EA 15 MIN 4/2/2018 ANNE Stallings/L GO, KX 2          OT G-codes  OT Professional Judgement Used?: Yes  OT Functional Scales Options: AM-PAC 6 Clicks Daily Activity (OT)  Score: 16  Functional Limitation: Self care  Self Care Current Status (): At least 40 percent but less than 60 percent impaired, limited or restricted  Self Care Goal Status (): At least 20 percent but less than 40 percent impaired, limited or restricted    YEN Diaz  4/2/2018

## 2018-04-02 NOTE — PLAN OF CARE
Problem: Patient Care Overview  Goal: Plan of Care Review  Outcome: Ongoing (interventions implemented as appropriate)   04/02/18 1027   Coping/Psychosocial   Plan of Care Reviewed With patient   Plan of Care Review   Progress improving   OTHER   Outcome Summary Pt transfers with SBA. Pt ambulates with RW with SBA. Pt min A for LSO. Pt S for toileting. Pt would benefit from acute rehab or home with HH. Pt would benefit from BSC at discharge. Continue OT POC

## 2018-04-02 NOTE — PROGRESS NOTES
"Neurosurgery Daily Progress Note    Assessment:   Eric Caldera is a 57 y.o. yo with a significant PMH of htn and depression who presented with Nhan S1 and left L5 radiculopathy.  Imaging revealed recurrent L5/S1 disc herniation.      DDX:   Patient Active Problem List   Diagnosis   • Degeneration of lumbar intervertebral disc   • Pain in both lower extremities   • Numbness in feet   • Claudication of both lower extremities   • BMI 28.0-28.9,adult   • Tobacco non-user   • Lumbar disc herniation with radiculopathy       Plan:   Neuro: POD#3 from L5/S1 ALIF and Posterior instrumentation and left L5 hemilaminectomy  CV: DOREEN  Pulm: RA  :  Davis out  FEN: Shannan PO, HLIV  GI: Awaiting BM. Fleets enema, KUB  ID: Post op Abx for 23 hours  Heme:  DVT prophylaxis begins today  Pain: Pain still poorly controlled.  Gabapentin, valium scheduled.  Increase oxycodone to 15 and schedule tylenol  Dispo: PT/OT, anticipate 4-5 days due to home situation      Chief complaint:   Back pain    Subjective  Complete resolution of leg symptoms.     Temp:  [97.9 °F (36.6 °C)-99.6 °F (37.6 °C)] 97.9 °F (36.6 °C)  Heart Rate:  [54-86] 81  Resp:  [16-18] 16  BP: (101-153)/(52-83) 129/83    Objective:  Vital signs: (most recent): Blood pressure 129/83, pulse 81, temperature 97.9 °F (36.6 °C), temperature source Oral, resp. rate 16, height 176.5 cm (69.49\"), weight 88.5 kg (195 lb 1.7 oz), SpO2 95 %.        Neurologic Exam     Mental Status   Oriented to person, place, and time.   Speech: speech is normal   Level of consciousness: alert    Cranial Nerves     CN III, IV, VI   Pupils are equal, round, and reactive to light.  Extraocular motions are normal.     CN V   Facial sensation intact.     CN VII   Facial expression full, symmetric.     Motor Exam   Right arm pronator drift: absent  Left arm pronator drift: absent    Strength   Right deltoid: 5/5  Left deltoid: 5/5  Right biceps: 5/5  Left biceps: 5/5  Right triceps: 5/5  Left triceps: " 5/5  Right iliopsoas: 5/5  Left iliopsoas: 5/5  Right quadriceps: 5/5  Left quadriceps: 5/5  Right gastroc: 5/5  Left gastroc: 5/5    Sensory Exam   Light touch normal.     Incision CDI    Drains:   [REMOVED] Urethral Catheter Non-latex;Silicone;Double-lumen 16 Fr. (Removed)   Removed 03/30/18 2000   Daily Indications Selected surgeries ( tract, abdomen) 3/30/2018  3:20 PM   Site Assessment Clean;Skin intact 3/30/2018  3:20 PM   Collection Container Standard drainage bag 3/30/2018  3:20 PM   Securement Method Securing device 3/30/2018  3:20 PM   Output (mL) 1200 mL 3/30/2018  7:54 PM       Active Problems:    Lumbar disc herniation with radiculopathy      Lab Results (last 24 hours)     ** No results found for the last 24 hours. **          [id number]  Mehran Almaraz MD

## 2018-04-02 NOTE — PLAN OF CARE
Problem: Patient Care Overview  Goal: Plan of Care Review  Outcome: Ongoing (interventions implemented as appropriate)   04/02/18 0541   Coping/Psychosocial   Plan of Care Reviewed With patient   Plan of Care Review   Progress improving   OTHER   Outcome Summary Turns self well in the bed. Medicated as needed for pain. Refuses KENZIE's but wears the SCD's. Pleasant       Problem: Laminectomy/Foraminotomy/Discectomy (Adult)  Goal: Signs and Symptoms of Listed Potential Problems Will be Absent, Minimized or Managed (Laminectomy/Foraminotomy/Discectomy)  Outcome: Ongoing (interventions implemented as appropriate)      Problem: Fall Risk (Adult)  Goal: Absence of Fall  Outcome: Ongoing (interventions implemented as appropriate)

## 2018-04-02 NOTE — THERAPY TREATMENT NOTE
Acute Care - Physical Therapy Treatment Note  [facility]     Patient Name: Eric Caldera  : 1960  MRN: [id number]  Today's Date: 2018  Onset of Illness/Injury or Date of Surgery: 18  Date of Referral to PT: 18  Referring Physician: Dr. Almaraz    Admit Date: 3/30/2018    Visit Dx:    ICD-10-CM ICD-9-CM   1. Lumbar disc herniation with radiculopathy M51.16 722.10   2. Decreased activities of daily living (ADL) Z78.9 V49.89   3. Impaired mobility Z74.09 799.89     Patient Active Problem List   Diagnosis   • Degeneration of lumbar intervertebral disc   • Pain in both lower extremities   • Numbness in feet   • Claudication of both lower extremities   • BMI 28.0-28.9,adult   • Tobacco non-user   • Lumbar disc herniation with radiculopathy       Therapy Treatment    Therapy Treatment / Health Promotion    Treatment Time/Intention  Discipline: physical therapy assistant (18 1418 : Sandee Campoverde PTA)  Document Type: therapy note (daily note) (18 1418 : Sandee Campoverde PTA)  Subjective Information: complains of, pain (18 1418 : Sandee Campoverde PTA)  Patient Effort: good (18 1418 : Sandee Campoverde PTA)  Existing Precautions/Restrictions: brace worn when out of bed, spinal (18 1418 : Sandee Campoverde PTA)  Plan of Care Review  Plan of Care Reviewed With: patient (18 0854 : Sandee Campoverde PTA)    Vitals/Pain/Safety  Pain Assessment  Additional Documentation: Pain Scale: Numbers Pre/Post-Treatment (Group) (18 0811 : Sandee Campoverde PTA)  Pain Scale: Numbers Pre/Post-Treatment  Pain Scale: Numbers, Pretreatment: 8/10 (18 1418 : Sandee Campoverde PTA)  Pain Scale: Numbers, Post-Treatment: 8/10 (18 1418 : Sandee Campoverde PTA)  Pain Location - Side: Left (18 1418 : Sandee Campoverde PTA)  Pain Location - Orientation: lower (18 1418 : Sandee Campoverde PTA)  Pain Location: hip (18 :  Sandee Campoverde PTA)  Pre/Post Treatment Pain Comment: pain in L hip and lower back on L (04/02/18 1418 : Sandee Campoverde PTA)  Pain Intervention(s): Medication (See MAR), Ambulation/increased activity (04/02/18 1418 : Sandee Campoverde PTA)  Pain Scale: Word Pre/Post-Treatment  Pain Location - Side: Left (04/02/18 1418 : Sandee Campoverde PTA)  Pain Location - Orientation: lower (04/02/18 1418 : Sandee Campoverde PTA)  Pain Location: hip (04/02/18 1418 : Sandee Campoverde PTA)  Pain Intervention(s): Medication (See MAR), Ambulation/increased activity (04/02/18 1418 : Sandee Campoverde PTA)  Pain Scale: FACES Pre/Post-Treatment  Pain Location - Side: Left (04/02/18 1418 : Sandee Campoverde PTA)  Pain Location - Orientation: lower (04/02/18 1418 : Sandee Campoverde PTA)  Pain Location: hip (04/02/18 1418 : Sandee Campoverde PTA)  Pain Intervention(s): Medication (See MAR), Ambulation/increased activity (04/02/18 1418 : Sandee Campoverde PTA)  Safety Issues, Functional Mobility  Impairments Affecting Function (Mobility): pain (04/02/18 1418 : Sandee Campoverde PTA)  Positioning and Restraints  Pre-Treatment Position: in bed (04/02/18 1418 : Sandee Campoverde PTA)  Post Treatment Position: bed (04/02/18 1418 : Sandee Campoverde PTA)  In Bed: supine, call light within reach, encouraged to call for assist, exit alarm on (04/02/18 1418 : Sandee Campoverde PTA)  In Chair: sitting, call light within reach, encouraged to call for assist, with nsg (04/02/18 0811 : Sandee Campoverde PTA)    Mobility,ADL,Motor, Modality  Bed Mobility Assessment/Treatment  Sidelying-Sit [place] (Bed Mobility): supervision (04/02/18 1418 : Sandee Campoverde PTA)  Sit-Sidelying [place] (Bed Mobility): supervision (04/02/18 1418 : Sandee Campoverde PTA)  Assistive Device (Bed Mobility): bed rails (04/02/18 1418 : Sandee Campoverde PTA)  Comment (Bed Mobility): cues to roll - spinal precautions  (04/02/18 0811 : Sandee Campoverde PTA)  Transfer Assessment/Treatment  Transfer Assessment/Treatment: toilet transfer (04/02/18 1418 : Sandee Campoverde PTA)  Sit-Stand Transfer  Sit-Stand [place] (Transfers): stand by assist (04/02/18 1418 : Sandee Campoverde PTA)  Assistive Device (Sit-Stand Transfers): walker, front-wheeled (04/02/18 1418 : Sandee Campoverde PTA)  Stand-Sit Transfer  Stand-Sit [place] (Transfers): stand by assist (04/02/18 1418 : Sandee Campoverde PTA)  Assistive Device (Stand-Sit Transfers): walker, front-wheeled (04/02/18 1418 : Sandee Campoverde PTA)  Toilet Transfer  Type (Toilet Transfer): sit-stand, stand-sit (04/02/18 1418 : Sandee Campoverde PTA)  [place] Level (Toilet Transfer): stand by assist (04/02/18 1418 : Sandee Campoverde PTA)  Assistive Device (Toilet Transfer): grab bars/safety frame, walker, front-wheeled (04/02/18 1418 : Sandee Campoverde PTA)  Gait/Stairs Assessment/Training  [place] Level (Gait): stand by assist (04/02/18 1418 : Sandee Campoverde PTA)  Assistive Device (Gait): walker, front-wheeled (04/02/18 1418 : Sandee Campoverde PTA)  Distance in Feet (Gait): 125 x 2 ( ) (04/02/18 1418 : Sandee Campoverde PTA)  Deviations/Abnormal Patterns (Gait): antalgic, jayson decreased (04/02/18 1418 : Sandee Campoverde PTA)  Bilateral Gait Deviations: heel strike decreased (04/02/18 1418 : Sandee Campoverde PTA)  [place] Level (Stairs): contact guard (04/02/18 1418 : Sandee Campoverde PTA)  Handrail Location (Stairs): both sides (04/02/18 1418 : Sandee Campoverde PTA)  Number of Steps (Stairs): 5 (04/02/18 1418 : Sandee Campoverde PTA)  Ascending Technique (Stairs): step-to-step (04/02/18 1418 : Sanede Campoverde PTA)  Descending Technique (Stairs): step-to-step (04/02/18 1418 : Sandee Campoverde PTA)  Stairs, Impairments: pain (04/02/18 1418 : Sandee Campoverde PTA)     Motor Skills  Assessment/Interventions  Additional Documentation: Therapeutic Exercise (Group), Therapeutic Exercise Interventions (Group) (04/02/18 0811 : Sandee Campoverde PTA)  Therapeutic Exercise  Exercise Type (Therapeutic Exercise): AROM (active range of motion) (04/02/18 0811 : Sandee Campoverde PTA)  Position (Therapeutic Exercise): seated (04/02/18 0811 : Sandee Campoverde PTA)  Sets/Reps (Therapeutic Exercise): 20 (04/02/18 0811 : Sandee Campoverde PTA)           ROM/MMT             Sensory, Edema, Orthotics     Orthotic/Prosthetic Management  Orthosis Location: spinal orthosis (04/02/18 1418 : Sandee Campoverde PTA)  Spinal Orthosis Management  Type (Spinal Orthosis): LSO (lumbar sacral orthosis) (04/02/18 1418 : Sandee Campoverde PTA)  Orthosis Training (Spinal Orthosis): donning/doffing orthosis (04/02/18 1418 : Sandee Campoverde PTA)    Cognition, Communication, Swallow  Cognitive Assessment/Intervention  Additional Documentation: Cognitive Assessment/Intervention (Group) (04/02/18 0811 : Sandee Campoverde PTA)  Cognitive Assessment/Intervention- PT/OT  Personal Safety Interventions: fall prevention program maintained, gait belt, nonskid shoes/slippers when out of bed (04/02/18 1418 : Sandee Campoverde PTA)    Outcome Summary               PT Rehab Goals     Row Name 03/31/18 1500 03/31/18 0830          Bed Mobility Goal 1 (PT)    Activity/Assistive Device (Bed Mobility Goal 1, PT)  -- bed mobility activities, all  -TB     [place] Level/Cues Needed (Bed Mobility Goal 1, PT)  -- independent  -TB     Time Frame (Bed Mobility Goal 1, PT)  -- by discharge  -TB     Barriers (Bed Mobility Goal 1, PT)  -- pain  -TB     Progress/Outcomes (Bed Mobility Goal 1, PT)  -- good progress toward goal  -TB        Transfer Goal 1 (PT)    Activity/Assistive Device (Transfer Goal 1, PT)  -- transfers, all  -TB     [place] Level/Cues Needed (Transfer Goal 1, PT)  -- supervision required  -TB     Time Frame  (Transfer Goal 1, PT)  -- by discharge  -TB     Barriers (Transfers Goal 1, PT)  -- pain  -TB     Progress/Outcome (Transfer Goal 1, PT)  -- good progress toward goal  -TB        Gait Training Goal 1 (PT)    Activity/Assistive Device (Gait Training Goal 1, PT) gait (walking locomotion)  -TB gait (walking locomotion)  -TB     [place] Level (Gait Training Goal 1, PT) supervision required  -TB supervision required  -TB     Distance (Gait Goal 1, PT) 200  -  -TB     Time Frame (Gait Training Goal 1, PT) by discharge  -TB by discharge  -TB     Barriers (Gait Training Goal 1, PT) pain  -TB pain  -TB     Progress/Outcome (Gait Training Goal 1, PT) good progress toward goal  -TB good progress toward goal  -TB        Patient Education Goal (PT)    Activity (Patient Education Goal, PT)  -- Independent with don/doff/use of LSO  -TB     [place]/Cues/Accuracy (Memory Goal 2, PT)  -- demonstrates adequately  -TB     Time Frame (Patient Education Goal, PT)  -- by discharge  -TB     Barriers (Patient Education Goal, PT)  -- pain  -TB     Progress/Outcome (Patient Education Goal, PT)  -- good progress toward goal  -TB       User Key  (r) = Recorded By, (t) = Taken By, (c) = Cosigned By    Initials Name Provider Type    TB Dilip Avila, PT Physical Therapist          Physical Therapy Education     Title: PT OT SLP Therapies (Active)     Topic: Physical Therapy (Active)     Point: Mobility training (Active)    Learning Progress Summary     Learner Status Readiness Method Response Comment Documented by    Patient Active Acceptance E,D NR bed mobility, transfers, gait, spinal precautions CW 04/02/18 0853     Active Acceptance E NR log rolling, don/doffing LSO KJ 04/01/18 1030     Active Acceptance E NR  TB 03/31/18 0912          Point: Home exercise program (Active)    Learning Progress Summary     Learner Status Readiness Method Response Comment Documented by    Patient Active Acceptance E,D NR bed mobility,  transfers, gait, spinal precautions CW 04/02/18 0853     Active Acceptance E NR log rolling, don/doffing LSO KJ 04/01/18 1030     Active Acceptance E NR  TB 03/31/18 0912          Point: Body mechanics (Active)    Learning Progress Summary     Learner Status Readiness Method Response Comment Documented by    Patient Active Acceptance E,D NR bed mobility, transfers, gait, spinal precautions CW 04/02/18 0853     Active Acceptance E NR log rolling, don/doffing LSO KJ 04/01/18 1030     Active Acceptance E NR  TB 03/31/18 0912          Point: Precautions (Active)    Learning Progress Summary     Learner Status Readiness Method Response Comment Documented by    Patient Active Acceptance E,D NR bed mobility, transfers, gait, spinal precautions CW 04/02/18 0853     Active Acceptance E NR log rolling, don/doffing LSO KJ 04/01/18 1030     Active Acceptance E NR   03/31/18 0912                      User Key     Initials Effective Dates Name Provider Type Discipline     08/02/16 -  Dilip Avila, PT Physical Therapist PT     08/02/16 -  Ximena Delcid, PTA Physical Therapy Assistant PT     06/22/15 -  Sandee Campoverde, PTA Physical Therapy Assistant PT                    PT Recommendation and Plan     Plan of Care Reviewed With: patient  Progress: improving  Outcome Summary: Pt requires cga/min assist for bed mobility and cga for sit to stand transfers.  Ambulated 60' twice cga with rolling walker. Pt c/o 9/10 lower left abdomen..  Will contine to benefit from therapy to increase strength and improve mobility.          Outcome Measures     Row Name 04/02/18 1000 04/02/18 0800 03/31/18 1500       How much help from another person do you currently need...    Turning from your back to your side while in flat bed without using bedrails?  -- 3  -CW 3  -TB    Moving from lying on back to sitting on the side of a flat bed without bedrails?  -- 3  -CW 3  -TB    Moving to and from a bed to a chair (including a wheelchair)?   -- 3  -CW 3  -TB    Standing up from a chair using your arms (e.g., wheelchair, bedside chair)?  -- 3  -CW 3  -TB    Climbing 3-5 steps with a railing?  -- 3  -CW 3  -TB    To walk in hospital room?  -- 3  -CW 3  -TB    AM-PAC 6 Clicks Score  -- 18  -CW 18  -TB       How much help from another is currently needed...    Putting on and taking off regular lower body clothing? 2  -TS  --  --    Bathing (including washing, rinsing, and drying) 2  -TS  --  --    Toileting (which includes using toilet bed pan or urinal) 3  -TS  --  --    Putting on and taking off regular upper body clothing 3  -TS  --  --    Taking care of personal grooming (such as brushing teeth) 3  -TS  --  --    Eating meals 4  -TS  --  --    Score 17  -TS  --  --       Functional Assessment    Outcome Measure Options AM-PAC 6 Clicks Daily Activity (OT)  -TS AM-[facility] 6 Clicks Basic Mobility (PT)  -CW  --    Row Name 03/31/18 0915 03/31/18 0830          How much help from another person do you currently need...    Turning from your back to your side while in flat bed without using bedrails?  -- 3  -TB     Moving from lying on back to sitting on the side of a flat bed without bedrails?  -- 3  -TB     Moving to and from a bed to a chair (including a wheelchair)?  -- 3  -TB     Standing up from a chair using your arms (e.g., wheelchair, bedside chair)?  -- 3  -TB     Climbing 3-5 steps with a railing?  -- 3  -TB     To walk in hospital room?  -- 3  -TB     AM-PAC 6 Clicks Score  -- 18  -TB        How much help from another is currently needed...    Putting on and taking off regular lower body clothing? 2  -AC  --     Bathing (including washing, rinsing, and drying) 2  -AC  --     Toileting (which includes using toilet bed pan or urinal) 3  -AC  --     Putting on and taking off regular upper body clothing 2  -AC  --     Taking care of personal grooming (such as brushing teeth) 3  -AC  --     Eating meals 4  -AC  --     Score 16  -AC  --        Functional  Assessment    Outcome Measure Options AM-PAC 6 Clicks Daily Activity (OT)  -AC AM-PAC 6 Clicks Basic Mobility (PT)  -TB       User Key  (r) = Recorded By, (t) = Taken By, (c) = Cosigned By    Initials Name Provider Type    TB Dilip Avila, PT Physical Therapist    AC Ariel Villarreal, OTR/L Occupational Therapist    TS Saniya Tavares, RODRÍGUEZ/L Occupational Therapy Assistant    CW Sandee Campoverde PTA Physical Therapy Assistant           Time Calculation:         PT Charges     Row Name 04/02/18 1458 04/02/18 0857          Time Calculation    Start Time 1418  -CW 0811  -CW     Stop Time 1450  -CW 0835  -CW     Time Calculation (min) 32 min  -CW 24 min  -CW     PT Received On 04/02/18  -CW 04/02/18  -CW     PT Goal Re-Cert Due Date 04/10/18  -CW 04/10/18  -CW        Time Calculation- PT    Total Timed Code Minutes- PT 32 minute(s)  -CW 24 minute(s)  -CW       User Key  (r) = Recorded By, (t) = Taken By, (c) = Cosigned By    Initials Name Provider Type    CW Sandee Campoverde PTA Physical Therapy Assistant          Therapy Charges for Today     Code Description Service Date Service Provider Modifiers Qty    47600909351 HC PT THER PROC EA 15 MIN 4/2/2018 Sandee Campoverde PTA GP, KX 1    55612557716 HC GAIT TRAINING EA 15 MIN 4/2/2018 Sandee Campoverde PTA GP, KX 1    40298144976 HC GAIT TRAINING EA 15 MIN 4/2/2018 Sandee Campoverde PTA GP, KX 2          PT G-Codes  PT Professional Judgement Used?: Yes  Outcome Measure Options: AM-PAC 6 Clicks Daily Activity (OT)  Score: 18  Functional Limitation: Mobility: Walking and moving around  Mobility: Walking and Moving Around Current Status (): At least 40 percent but less than 60 percent impaired, limited or restricted  Mobility: Walking and Moving Around Goal Status (): At least 20 percent but less than 40 percent impaired, limited or restricted    Sandee Campoverde PTA  4/2/2018

## 2018-04-02 NOTE — THERAPY TREATMENT NOTE
Acute Care - Physical Therapy Treatment Note  [facility]     Patient Name: Eric Caldera  : 1960  MRN: [id number]  Today's Date: 2018  Onset of Illness/Injury or Date of Surgery: 18  Date of Referral to PT: 18  Referring Physician: Dr. Almaraz    Admit Date: 3/30/2018    Visit Dx:    ICD-10-CM ICD-9-CM   1. Lumbar disc herniation with radiculopathy M51.16 722.10   2. Decreased activities of daily living (ADL) Z78.9 V49.89   3. Impaired mobility Z74.09 799.89     Patient Active Problem List   Diagnosis   • Degeneration of lumbar intervertebral disc   • Pain in both lower extremities   • Numbness in feet   • Claudication of both lower extremities   • BMI 28.0-28.9,adult   • Tobacco non-user   • Lumbar disc herniation with radiculopathy       Therapy Treatment    Therapy Treatment / Health Promotion    Treatment Time/Intention  Discipline: physical therapy assistant (18 0811 : Sandee Campoverde PTA)  Document Type: therapy note (daily note) (18 0811 : Sandee Campoverde PTA)  Subjective Information: complains of, pain (18 0811 : Sandee Campoverde PTA)  Patient Effort: good (18 08 : Sandee Campoverde PTA)  Existing Precautions/Restrictions: spinal, brace worn when out of bed (18 0811 : Sandee Campoverde PTA)  Plan of Care Review  Plan of Care Reviewed With: patient (18 0854 : Sandee Campoverde PTA)    Vitals/Pain/Safety  Pain Assessment  Additional Documentation: Pain Scale: Numbers Pre/Post-Treatment (Group) (18 08Jaspreet : Sandee Campoverde PTA)  Pain Scale: Numbers Pre/Post-Treatment  Pain Scale: Numbers, Pretreatment: 9/10 (18 08Jaspreet : Sandee Campoverde PTA)  Pain Scale: Numbers, Post-Treatment: 9/10 (18 08 : Sandee Campoverde PTA)  Pain Location - Side: Left (18 08 : Sandee Campoverde PTA)  Pain Location - Orientation: lower (18 : Sandee Campoverde PTA)  Pain Location: abdomen (18 :  Sandee Campoverde PTA)  Pre/Post Treatment Pain Comment: incisional pain (04/02/18 0811 : Sandee Campoverde PTA)  Pain Intervention(s): Ambulation/increased activity (04/02/18 0811 : Sandee Campoverde PTA)  Pain Scale: Word Pre/Post-Treatment  Pain Location - Side: Left (04/02/18 0811 : Sandee Campoverde PTA)  Pain Location - Orientation: lower (04/02/18 0811 : Sandee Campoverde PTA)  Pain Location: abdomen (04/02/18 0811 : Sandee Campoverde PTA)  Pain Intervention(s): Ambulation/increased activity (04/02/18 0811 : Sandee Campoverde PTA)  Pain Scale: FACES Pre/Post-Treatment  Pain Location - Side: Left (04/02/18 0811 : Sandee Campoverde PTA)  Pain Location - Orientation: lower (04/02/18 0811 : Sandee Campoverde PTA)  Pain Location: abdomen (04/02/18 0811 : Sandee Campoverde PTA)  Pain Intervention(s): Ambulation/increased activity (04/02/18 0811 : Sandee Campoverde PTA)  Safety Issues, Functional Mobility  Impairments Affecting Function (Mobility): pain, strength (04/02/18 0811 : Sandee Campoverde PTA)  Positioning and Restraints  Pre-Treatment Position: in bed (04/02/18 0811 : Sandee Campoverde PTA)  Post Treatment Position: chair (04/02/18 0811 : Sandee Campoverde PTA)  In Chair: sitting, call light within reach, encouraged to call for assist, with nsg (04/02/18 0811 : Sandee Campoverde PTA)    Mobility,ADL,Motor, Modality  Bed Mobility Assessment/Treatment  Sidelying-Sit Rooks (Bed Mobility): contact guard (04/02/18 0811 : Sandee Campoverde PTA)  Assistive Device (Bed Mobility): bed rails (04/02/18 0811 : Sandee Campoverde PTA)  Comment (Bed Mobility): cues to roll - spinal precautions (04/02/18 0811 : Sandee Campoverde PTA)  Sit-Stand Transfer  Sit-Stand Rooks (Transfers): contact guard (04/02/18 0811 : Sandee Campoverde PTA)  Stand-Sit Transfer  Stand-Sit Rooks (Transfers): contact guard (04/02/18 0811 : Sandee Campoverde PTA)  Gait/Stairs  Assessment/Training  [place] Level (Gait): contact guard (04/02/18 0811 : Sandee Campoverde PTA)  Assistive Device (Gait): walker, front-wheeled (04/02/18 0811 : Sandee Campoverde PTA)  Distance in Feet (Gait): 60 (x 2 with standing rest) (04/02/18 0811 : Sandee Campoverde PTA)  Deviations/Abnormal Patterns (Gait): antalgic, jayson decreased (04/02/18 0811 : Sandee Campoverde PTA)  Bilateral Gait Deviations: forward flexed posture, heel strike decreased (04/02/18 0811 : Sandee Campoverde PTA)     Motor Skills Assessment/Interventions  Additional Documentation: Therapeutic Exercise (Group), Therapeutic Exercise Interventions (Group) (04/02/18 0811 : Sandee Campoverde PTA)  Therapeutic Exercise  Exercise Type (Therapeutic Exercise): AROM (active range of motion) (04/02/18 0811 : Sandee Campoverde PTA)  Position (Therapeutic Exercise): seated (04/02/18 0811 : Sandee Campoverde PTA)  Sets/Reps (Therapeutic Exercise): 20 (04/02/18 0811 : Sandee Campoverde PTA)           ROM/MMT             Sensory, Edema, Orthotics          Cognition, Communication, Swallow  Cognitive Assessment/Intervention  Additional Documentation: Cognitive Assessment/Intervention (Group) (04/02/18 0811 : Sandee Campoverde PTA)  Cognitive Assessment/Intervention- PT/OT  Personal Safety Interventions: fall prevention program maintained, gait belt, nonskid shoes/slippers when out of bed (04/02/18 0811 : Sandee Campoverde PTA)    Outcome Summary               PT Rehab Goals     Row Name 03/31/18 1500 03/31/18 0830          Bed Mobility Goal 1 (PT)    Activity/Assistive Device (Bed Mobility Goal 1, PT)  -- bed mobility activities, all  -TB     [place] Level/Cues Needed (Bed Mobility Goal 1, PT)  -- independent  -TB     Time Frame (Bed Mobility Goal 1, PT)  -- by discharge  -TB     Barriers (Bed Mobility Goal 1, PT)  -- pain  -TB     Progress/Outcomes (Bed Mobility Goal 1, PT)  -- good progress toward goal  -TB         Transfer Goal 1 (PT)    Activity/Assistive Device (Transfer Goal 1, PT)  -- transfers, all  -TB     [place] Level/Cues Needed (Transfer Goal 1, PT)  -- supervision required  -TB     Time Frame (Transfer Goal 1, PT)  -- by discharge  -TB     Barriers (Transfers Goal 1, PT)  -- pain  -TB     Progress/Outcome (Transfer Goal 1, PT)  -- good progress toward goal  -TB        Gait Training Goal 1 (PT)    Activity/Assistive Device (Gait Training Goal 1, PT) gait (walking locomotion)  -TB gait (walking locomotion)  -TB     [place] Level (Gait Training Goal 1, PT) supervision required  -TB supervision required  -TB     Distance (Gait Goal 1, PT) 200  -  -TB     Time Frame (Gait Training Goal 1, PT) by discharge  -TB by discharge  -TB     Barriers (Gait Training Goal 1, PT) pain  -TB pain  -TB     Progress/Outcome (Gait Training Goal 1, PT) good progress toward goal  -TB good progress toward goal  -TB        Patient Education Goal (PT)    Activity (Patient Education Goal, PT)  -- Independent with don/doff/use of LSO  -TB     [place]/Cues/Accuracy (Memory Goal 2, PT)  -- demonstrates adequately  -TB     Time Frame (Patient Education Goal, PT)  -- by discharge  -TB     Barriers (Patient Education Goal, PT)  -- pain  -TB     Progress/Outcome (Patient Education Goal, PT)  -- good progress toward goal  -TB       User Key  (r) = Recorded By, (t) = Taken By, (c) = Cosigned By    Initials Name Provider Type    TB Dilip Avila, PT Physical Therapist          Physical Therapy Education     Title: PT OT SLP Therapies (Active)     Topic: Physical Therapy (Active)     Point: Mobility training (Active)    Learning Progress Summary     Learner Status Readiness Method Response Comment Documented by    Patient Active Acceptance E,D NR bed mobility, transfers, gait, spinal precautions CW 04/02/18 0853     Active Acceptance E NR log rolling, don/doffing LSO KJ 04/01/18 1030     Active Acceptance E NR  TB 03/31/18 0912           Point: Home exercise program (Active)    Learning Progress Summary     Learner Status Readiness Method Response Comment Documented by    Patient Active Acceptance E,D NR bed mobility, transfers, gait, spinal precautions CW 04/02/18 0853     Active Acceptance E NR log rolling, don/doffing LSO KJ 04/01/18 1030     Active Acceptance E NR   03/31/18 0912          Point: Body mechanics (Active)    Learning Progress Summary     Learner Status Readiness Method Response Comment Documented by    Patient Active Acceptance E,D NR bed mobility, transfers, gait, spinal precautions CW 04/02/18 0853     Active Acceptance E NR log rolling, don/doffing LSO KJ 04/01/18 1030     Active Acceptance E NR   03/31/18 0912          Point: Precautions (Active)    Learning Progress Summary     Learner Status Readiness Method Response Comment Documented by    Patient Active Acceptance E,D NR bed mobility, transfers, gait, spinal precautions CW 04/02/18 0853     Active Acceptance E NR log rolling, don/doffing LSO KJ 04/01/18 1030     Active Acceptance E NR   03/31/18 0912                      User Key     Initials Effective Dates Name Provider Type Discipline     08/02/16 -  Dilip Avila, PT Physical Therapist PT     08/02/16 -  Ximena Delcid, PTA Physical Therapy Assistant PT     06/22/15 -  Sandee Campoverde, PTA Physical Therapy Assistant PT                    PT Recommendation and Plan     Plan of Care Reviewed With: patient  Progress: improving  Outcome Summary: Pt requires cga/min assist for bed mobility and cga for sit to stand transfers.  Ambulated 60' twice cga with rolling walker. Pt c/o 9/10 lower left abdomen..  Will contine to benefit from therapy to increase strength and improve mobility.          Outcome Measures     Row Name 04/02/18 0800 03/31/18 1500 03/31/18 0915       How much help from another person do you currently need...    Turning from your back to your side while in flat bed without using  bedrails? 3  -CW 3  -TB  --    Moving from lying on back to sitting on the side of a flat bed without bedrails? 3  -CW 3  -TB  --    Moving to and from a bed to a chair (including a wheelchair)? 3  -CW 3  -TB  --    Standing up from a chair using your arms (e.g., wheelchair, bedside chair)? 3  -CW 3  -TB  --    Climbing 3-5 steps with a railing? 3  -CW 3  -TB  --    To walk in hospital room? 3  -CW 3  -TB  --    AM-PAC 6 Clicks Score 18  -CW 18  -TB  --       How much help from another is currently needed...    Putting on and taking off regular lower body clothing?  --  -- 2  -AC    Bathing (including washing, rinsing, and drying)  --  -- 2  -AC    Toileting (which includes using toilet bed pan or urinal)  --  -- 3  -AC    Putting on and taking off regular upper body clothing  --  -- 2  -AC    Taking care of personal grooming (such as brushing teeth)  --  -- 3  -AC    Eating meals  --  -- 4  -AC    Score  --  -- 16  -AC       Functional Assessment    Outcome Measure Options AM-PAC 6 Clicks Basic Mobility (PT)  -CW  -- AM-PAC 6 Clicks Daily Activity (OT)  -AC    Row Name 03/31/18 0830             How much help from another person do you currently need...    Turning from your back to your side while in flat bed without using bedrails? 3  -TB      Moving from lying on back to sitting on the side of a flat bed without bedrails? 3  -TB      Moving to and from a bed to a chair (including a wheelchair)? 3  -TB      Standing up from a chair using your arms (e.g., wheelchair, bedside chair)? 3  -TB      Climbing 3-5 steps with a railing? 3  -TB      To walk in hospital room? 3  -TB      AM-PAC 6 Clicks Score 18  -TB         Functional Assessment    Outcome Measure Options AM-PAC 6 Clicks Basic Mobility (PT)  -TB        User Key  (r) = Recorded By, (t) = Taken By, (c) = Cosigned By    Initials Name Provider Type    TB Dilip Avila, PT Physical Therapist    AC Ariel Villarreal, OTR/L Occupational Therapist    BRITTNEY RAMIREZ  AMADEO Campoverde Physical Therapy Assistant           Time Calculation:         PT Charges     Row Name 04/02/18 0857             Time Calculation    Start Time 0811  -CW      Stop Time 0835  -CW      Time Calculation (min) 24 min  -CW      PT Received On 04/02/18  -CW      PT Goal Re-Cert Due Date 04/10/18  -CW         Time Calculation- PT    Total Timed Code Minutes- PT 24 minute(s)  -CW        User Key  (r) = Recorded By, (t) = Taken By, (c) = Cosigned By    Initials Name Provider Type    CW Sandee Campoverde PTA Physical Therapy Assistant          Therapy Charges for Today     Code Description Service Date Service Provider Modifiers Qty    49254550734 HC PT THER PROC EA 15 MIN 4/2/2018 Sandee Campoverde PTA GP, KX 1    01841243574 HC GAIT TRAINING EA 15 MIN 4/2/2018 Sandee Campoverde PTA GP, KX 1          PT G-Codes  PT Professional Judgement Used?: Yes  Outcome Measure Options: AM-PAC 6 Clicks Basic Mobility (PT)  Score: 18  Functional Limitation: Mobility: Walking and moving around  Mobility: Walking and Moving Around Current Status (): At least 40 percent but less than 60 percent impaired, limited or restricted  Mobility: Walking and Moving Around Goal Status (): At least 20 percent but less than 40 percent impaired, limited or restricted    Sandee Campoverde PTA  4/2/2018

## 2018-04-02 NOTE — PLAN OF CARE
Problem: Patient Care Overview  Goal: Plan of Care Review  Outcome: Ongoing (interventions implemented as appropriate)   04/02/18 2102   Coping/Psychosocial   Plan of Care Reviewed With patient   Plan of Care Review   Progress improving   OTHER   Outcome Summary Pt requires cga/min assist for bed mobility and cga for sit to stand transfers. Ambulated 60' twice cga with rolling walker. Pt c/o 9/10 lower left abdomen.. Will continue to benefit from therapy to increase strength and improve mobility.

## 2018-04-02 NOTE — PROGRESS NOTES
Discharge Planning Assessment  [facility]     Patient Name: Eric Caldera  MRN: [id number]  Today's Date: 4/2/2018    Admit Date: 3/30/2018          Discharge Needs Assessment     Row Name 04/02/18 1046       Living Environment    Lives With child(eugene), adult   Son    Current Living Arrangements home/apartment/condo    Primary Care Provided by child(eugene)   Patient's son assists but does work.     Family Caregiver if Needed child(eugene), adult    Quality of Family Relationships helpful;involved;supportive    Able to Return to Prior Arrangements yes       Resource/Environmental Concerns    Transportation Concerns car, none       Transition Planning    Patient/Family Anticipates Transition to home with family    Patient/Family Anticipated Services at Transition none    Transportation Anticipated family or friend will provide       Discharge Needs Assessment    Readmission Within the Last 30 Days no previous admission in last 30 days    Concerns to be Addressed no discharge needs identified;denies needs/concerns at this time    Equipment Currently Used at Home cane, straight    Discharge Coordination/Progress Patient states he lives with his son and does plan to return home at discharge. No definite discharge planning needs identified at this time.             Discharge Plan    No documentation.       Destination     No service coordination in this encounter.      Durable Medical Equipment     No service coordination in this encounter.      Dialysis/Infusion     No service coordination in this encounter.      Home Medical Care     No service coordination in this encounter.      Social Care     No service coordination in this encounter.                Demographic Summary    No documentation.           Functional Status    No documentation.           Psychosocial    No documentation.           Abuse/Neglect    No documentation.           Legal    No documentation.           Substance Abuse    No documentation.            Patient Forms    No documentation.         ASTON FerrerW

## 2018-04-02 NOTE — PLAN OF CARE
Problem: Patient Care Overview  Goal: Plan of Care Review  Outcome: Ongoing (interventions implemented as appropriate)   04/02/18 8113   Coping/Psychosocial   Plan of Care Reviewed With patient   Plan of Care Review   Progress improving   OTHER   Outcome Summary VSS. Patient had decreased pain compaired to previous shift. Patient able to ambulate well with PT.      Goal: Individualization and Mutuality  Outcome: Ongoing (interventions implemented as appropriate)    Goal: Discharge Needs Assessment  Outcome: Ongoing (interventions implemented as appropriate)    Goal: Interprofessional Rounds/Family Conf  Outcome: Ongoing (interventions implemented as appropriate)      Problem: Laminectomy/Foraminotomy/Discectomy (Adult)  Goal: Signs and Symptoms of Listed Potential Problems Will be Absent, Minimized or Managed (Laminectomy/Foraminotomy/Discectomy)  Outcome: Ongoing (interventions implemented as appropriate)      Problem: Fall Risk (Adult)  Goal: Absence of Fall  Outcome: Ongoing (interventions implemented as appropriate)

## 2018-04-03 VITALS
BODY MASS INDEX: 28.9 KG/M2 | OXYGEN SATURATION: 95 % | DIASTOLIC BLOOD PRESSURE: 80 MMHG | TEMPERATURE: 98.3 F | WEIGHT: 195.11 LBS | SYSTOLIC BLOOD PRESSURE: 116 MMHG | RESPIRATION RATE: 18 BRPM | HEART RATE: 65 BPM | HEIGHT: 69 IN

## 2018-04-03 PROBLEM — M51.16 LUMBAR DISC HERNIATION WITH RADICULOPATHY: Status: RESOLVED | Noted: 2018-02-26 | Resolved: 2018-04-03

## 2018-04-03 PROCEDURE — 97535 SELF CARE MNGMENT TRAINING: CPT

## 2018-04-03 PROCEDURE — 97110 THERAPEUTIC EXERCISES: CPT

## 2018-04-03 PROCEDURE — 25010000002 HEPARIN (PORCINE) PER 1000 UNITS: Performed by: NEUROLOGICAL SURGERY

## 2018-04-03 PROCEDURE — 97116 GAIT TRAINING THERAPY: CPT

## 2018-04-03 RX ORDER — GABAPENTIN 100 MG/1
100 CAPSULE ORAL EVERY 8 HOURS SCHEDULED
Qty: 90 CAPSULE | Refills: 0 | Status: SHIPPED | OUTPATIENT
Start: 2018-04-03 | End: 2018-05-03

## 2018-04-03 RX ORDER — OXYCODONE HYDROCHLORIDE 15 MG/1
15 TABLET ORAL EVERY 4 HOURS PRN
Qty: 60 TABLET | Refills: 0 | Status: SHIPPED | OUTPATIENT
Start: 2018-04-03 | End: 2018-04-11

## 2018-04-03 RX ORDER — ACETAMINOPHEN 325 MG/1
650 TABLET ORAL EVERY 6 HOURS
Qty: 240 TABLET | Refills: 0 | Status: SHIPPED | OUTPATIENT
Start: 2018-04-03 | End: 2018-05-03

## 2018-04-03 RX ORDER — SENNA AND DOCUSATE SODIUM 50; 8.6 MG/1; MG/1
2 TABLET, FILM COATED ORAL 2 TIMES DAILY
Qty: 120 TABLET | Refills: 0 | Status: SHIPPED | OUTPATIENT
Start: 2018-04-03 | End: 2018-05-03

## 2018-04-03 RX ORDER — DIAZEPAM 5 MG/1
5 TABLET ORAL EVERY 8 HOURS PRN
Qty: 60 TABLET | Refills: 0 | Status: SHIPPED | OUTPATIENT
Start: 2018-04-03 | End: 2018-04-16 | Stop reason: SDUPTHER

## 2018-04-03 RX ADMIN — OXYCODONE HYDROCHLORIDE 15 MG: 5 TABLET ORAL at 08:57

## 2018-04-03 RX ADMIN — OXYCODONE HYDROCHLORIDE 15 MG: 5 TABLET ORAL at 14:51

## 2018-04-03 RX ADMIN — OXYCODONE HYDROCHLORIDE 15 MG: 5 TABLET ORAL at 04:06

## 2018-04-03 RX ADMIN — ACETAMINOPHEN 650 MG: 325 TABLET, FILM COATED ORAL at 07:41

## 2018-04-03 RX ADMIN — ACETAMINOPHEN 650 MG: 325 TABLET, FILM COATED ORAL at 11:42

## 2018-04-03 RX ADMIN — LEVOTHYROXINE SODIUM 100 MCG: 100 TABLET ORAL at 06:00

## 2018-04-03 RX ADMIN — DOCUSATE SODIUM AND SENNOSIDES 2 TABLET: 8.6; 5 TABLET, FILM COATED ORAL at 08:52

## 2018-04-03 RX ADMIN — DIAZEPAM 5 MG: 5 TABLET ORAL at 06:00

## 2018-04-03 RX ADMIN — GABAPENTIN 100 MG: 100 CAPSULE ORAL at 06:00

## 2018-04-03 RX ADMIN — HEPARIN SODIUM 5000 UNITS: 5000 INJECTION INTRAVENOUS; SUBCUTANEOUS at 08:52

## 2018-04-03 RX ADMIN — GABAPENTIN 100 MG: 100 CAPSULE ORAL at 13:54

## 2018-04-03 RX ADMIN — SERTRALINE 150 MG: 100 TABLET, FILM COATED ORAL at 08:52

## 2018-04-03 RX ADMIN — ACETAMINOPHEN 650 MG: 325 TABLET, FILM COATED ORAL at 15:52

## 2018-04-03 RX ADMIN — DIAZEPAM 5 MG: 5 TABLET ORAL at 13:54

## 2018-04-03 NOTE — PROGRESS NOTES
"Neurosurgery Daily Progress Note    Assessment:   Eric Caldera is a 57 y.o. yo with a significant PMH of htn and depression who presented with Nhan S1 and left L5 radiculopathy.  Imaging revealed recurrent L5/S1 disc herniation.      DDX:   Patient Active Problem List   Diagnosis   • Degeneration of lumbar intervertebral disc   • Pain in both lower extremities   • Numbness in feet   • Claudication of both lower extremities   • BMI 28.0-28.9,adult   • Tobacco non-user   • Lumbar disc herniation with radiculopathy       Plan:   Neuro: POD#4 from L5/S1 ALIF and Posterior instrumentation and left L5 hemilaminectomy  CV: DOREEN  Pulm: RA  :  Davis out  FEN: Shannan PO, HLIV  GI: Awaiting BM. Gina enema, KUB  ID: DOREEN  Heme:  DVT prophylaxis begins today  Pain: Pain controlled.  Gabapentin, valium scheduled.  oxycodone to 15 and schedule tylenol  Dispo: PT/OT, roll walker.  Home today      Chief complaint:   Back pain    Subjective  Complete resolution of leg symptoms.     Temp:  [97.6 °F (36.4 °C)-98.9 °F (37.2 °C)] 98.3 °F (36.8 °C)  Heart Rate:  [72-87] 72  Resp:  [16-20] 18  BP: ()/(55-79) 106/55    Objective:  Vital signs: (most recent): Blood pressure 106/55, pulse 72, temperature 98.3 °F (36.8 °C), temperature source Oral, resp. rate 18, height 176.5 cm (69.49\"), weight 88.5 kg (195 lb 1.7 oz), SpO2 93 %.        Neurologic Exam     Mental Status   Oriented to person, place, and time.   Speech: speech is normal   Level of consciousness: alert    Cranial Nerves     CN III, IV, VI   Pupils are equal, round, and reactive to light.  Extraocular motions are normal.     CN V   Facial sensation intact.     CN VII   Facial expression full, symmetric.     Motor Exam   Right arm pronator drift: absent  Left arm pronator drift: absent    Strength   Right deltoid: 5/5  Left deltoid: 5/5  Right biceps: 5/5  Left biceps: 5/5  Right triceps: 5/5  Left triceps: 5/5  Right iliopsoas: 5/5  Left iliopsoas: 5/5  Right quadriceps: " 5/5  Left quadriceps: 5/5  Right gastroc: 5/5  Left gastroc: 5/5    Sensory Exam   Light touch normal.     Incision CDI    Drains:   [REMOVED] Urethral Catheter Non-latex;Silicone;Double-lumen 16 Fr. (Removed)   Removed 03/30/18 2000   Daily Indications Selected surgeries ( tract, abdomen) 3/30/2018  3:20 PM   Site Assessment Clean;Skin intact 3/30/2018  3:20 PM   Collection Container Standard drainage bag 3/30/2018  3:20 PM   Securement Method Securing device 3/30/2018  3:20 PM   Output (mL) 1200 mL 3/30/2018  7:54 PM       Active Problems:    Lumbar disc herniation with radiculopathy      Lab Results (last 24 hours)     ** No results found for the last 24 hours. **          [id number]  Mehran Almaraz MD

## 2018-04-03 NOTE — PLAN OF CARE
Problem: Patient Care Overview  Goal: Plan of Care Review  Outcome: Ongoing (interventions implemented as appropriate)   04/03/18 0888   Coping/Psychosocial   Plan of Care Reviewed With patient   Plan of Care Review   Progress improving   OTHER   Outcome Summary Pt S for UB/LB dressing. Pt able to don LSO independently and in correct position. Pt educated on home safety and work simplification. Pt plans to discharge home with son.

## 2018-04-03 NOTE — PLAN OF CARE
Problem: Patient Care Overview  Goal: Plan of Care Review  Outcome: Outcome(s) achieved Date Met: 04/03/18 04/03/18 1444   Coping/Psychosocial   Plan of Care Reviewed With patient   Plan of Care Review   Progress no change   OTHER   Outcome Summary VSS. Managed pain throughout shift. Patient able to ambulate well with PT. Patient being discharged home with pain medicine.     Goal: Individualization and Mutuality  Outcome: Outcome(s) achieved Date Met: 04/03/18    Goal: Discharge Needs Assessment  Outcome: Outcome(s) achieved Date Met: 04/03/18    Goal: Interprofessional Rounds/Family Conf  Outcome: Outcome(s) achieved Date Met: 04/03/18      Problem: Laminectomy/Foraminotomy/Discectomy (Adult)  Goal: Signs and Symptoms of Listed Potential Problems Will be Absent, Minimized or Managed (Laminectomy/Foraminotomy/Discectomy)  Outcome: Outcome(s) achieved Date Met: 04/03/18      Problem: Fall Risk (Adult)  Goal: Absence of Fall  Outcome: Outcome(s) achieved Date Met: 04/03/18

## 2018-04-03 NOTE — PROGRESS NOTES
Continued Stay Note   [place]     Patient Name: Eric Caldera  MRN: [id number]  Today's Date: 4/3/2018    Admit Date: 3/30/2018          Discharge Plan     Row Name 04/03/18 1446       Plan    Plan Walker    Patient/Family in Agreement with Plan yes    Final Discharge Disposition Code 01 - home or self-care    Final Note Patient is discharged today with an order for a walker. SW spoke to patient in room and he would like for walker to be delivered to room. SW provided options for DME agencies and patient has selected [facility]. SW faxed referral to [facility] at [phone]. SW requested that walker be delivered to room as he is discharged home today.                Expected Discharge Date and Time     Expected Discharge Date Expected Discharge Time    Apr 3, 2018  1:58 PM            TWIN Ferrer

## 2018-04-03 NOTE — DISCHARGE INSTRUCTIONS
[facility] Neurosurgery    Postoperative care following spine surgery  Dear Patient,  You have recently undergone spine surgery (L5/S1 fusion) and are now ready to go home. These written instructions are intended to help you to recover quickly.  • If you have ANY QUESTIONS about your condition prior to discharge please ask Dr. Almaraz. In particular, if you have concerns about going home discuss them now. We do not want you to go until you are completely comfortable leaving the hospital.   • If you have ANY QUESTIONS about your condition after you go home call your doctor. The number is [phone] which is answered 24 hours a day. During regular working hours a  will connect you to your doctor, one of his partners, or one of our nurses. At night or on weekends the answering service will connect you with the physician on call. DO NOT HESITATE to call. We want to help you with any problems.     Deep vein thrombosis/ pulmonary embolus  Some patients who undergo surgery develop blood clots in the veins of the legs. These clots can cause pain or swelling in the legs, or may cause no obvious problem. They can break free from the legs and travel to the lungs causing shortness of breath and/or chest pain.you develop pain or swelling in your legs after surgery, call your doctor. If you develop breathing problems or chest pain after surgery, call [phone].    Neurological Deficit  Neurological deficits are problems with brain function like speech difficulty, weakness, numbness, imbalance, etc. These deficits may be present before or after spine surgery. Prior to discharge your doctor will make sure that all treatment needed to help you recover from such deficits has been instituted. He will also make sure that these deficits are stable or improving. After you go home, if you think any of your spine problems are getting worse, not better, it may be a sign of bleeding, infection, or other problems. Call your doctor.  He will order tests and prescribe treatment as needed.    Activity Restrictions  In general after surgery you will be on restricted activities for several weeks to several months depending on the nature of your operation. For six weeks you should avoid heavy lifting (over 8 lbs or roughly a gallon of milk), bending, or stooping. You may be released by Dr. Almaraz earlier. You may return to driving when you feel comfortable enough that you can safely handle your vehicle AND you are not taking narcotic pain medications. This may be as early as 10 - 14 days, but could be longer as instructed by your neurosurgeon. After surgery you may gradually increase your activities, as you are able to tolerate. Walking is an excellent low impact exercise to begin after surgery. You should try to make regular walks of 15 to 30 minutes part of your postoperative recovery as you become able to do so. It typically requires a period of days to a few weeks to reach this level of activity and should be done in a gradual fashion. Your return to work will depend on your job requirements. In general, you may return to light duty work as soon as you feel comfortable and are not taking routine narcotic pain medications.    Medication  It is important to take your medication EXACTLY as prescribed. Some patients are reluctant to take pain medication. It is perfectly fine to take pain medication for several weeks after surgery. We want to eliminate pain whenever possible. Many pain medications can cause nausea (sick to your stomach), constipation (inability to poop), or itching. Nausea may be minimized by taking the medication with food. Constipation can be relieved by taking stool softeners and/ or laxatives that you can purchase over the counter as needed.    Wound Care  Your incision is held together with either staples that need to be removed in 2 weeks and dissolvable sutures that do not need to be removed.     Seventy-two hours after  surgery it is OK to get the wound wet, so you can take a shower or bath.     You do not need to put any medication (like Neosporin or Vitamin E) on the wound. Scrubbing the wound should be avoided until the staples nondissolvable sutures come out.     Heating pads have the potential to cause very serious burns, especially in patients using narcotic pain medications (e.g. Oxycodone, Oxycontin, etc.) Do not use heating pads during your recovery.      No nicotine products, including second-hand smoke, gum or patches. Nicotine will delay healing and increase the likelihood of a surgical complication. For help quitting, call the Quitline: 1-315.735.4059     Potential wound problems include the following:  • Infection--If the wound becomes red, tender, swollen, or warm it may be infected. Infection is often accompanied by fever. If you think your wound might be infected you should call your doctor. Often you can send us a picture of the wound so we can better evaluate it.   • Drainage--Fluid should not drain from your wound. If it does, call your doctor. Colored fluid may indicate infection. Clear fluid may indicate leakage of spinal fluid.   • Dehiscence--If the wound does not heal properly it may open up along the staple line. This is called dehiscence. Call us immediately.   • Sutures--Occasionally, one of the buried threads (sutures) may work through the skin. If you think this has happened call your doctor.   • Swelling--Spinal fluid or blood may collect under the skin. This is usually harmless, but needs to be evaluated. Call your doctor.     How to contact your doctor  Dr. Almaraz and his team did your surgery and, therefore, are likely to know more about your condition than any other physicians. We are immediately available to help you with any problems after surgery. Please call us for any concerns at the following numbers:  • Doctor’s office:  [phone] (answered 24 hours a day)   • [facility]  : [phone] (alternative emergency number for on-call neurosurgeon)     Specific instructions related to your surgery  Diet: no restrictions, eat a heart healthy diet. If you are diabetic, tightly controlling your blood sugar over the next 2 weeks is crucial in controlling infection.     Activity: as tolerated, no heavy lifting or strenuous exercise for at least 2 weeks.    Brace/collar instructions: Please wear your LSO brace when out of bed.      Please do not use NSAIDs (Ibuprofen, Toradol, etc) for 4 weeks following spinal fusion, as this can prevent bone growth. Tylenol is acceptable as an over the counter pain management.     Follow up:   Follow up with Dr. Almaraz in the neurosurgery clinic ([phone]) in 2 weeks. We will schedule this appointment for you.            Sincerely,        Maximiliano Almaraz MD, PhD, MPH

## 2018-04-03 NOTE — PLAN OF CARE
Problem: Patient Care Overview  Goal: Plan of Care Review  Outcome: Ongoing (interventions implemented as appropriate)   04/03/18 0900   Coping/Psychosocial   Plan of Care Reviewed With patient   Plan of Care Review   Progress improving   OTHER   Outcome Summary Pt up in chair upon entering room. Independent for sit to stand transfers and gait. Ambulated 200' with rolling walker and completed active sitting BLE exercises. Plans to discharge home today with family.

## 2018-04-03 NOTE — THERAPY TREATMENT NOTE
"Acute Care - Occupational Therapy Treatment Note  [facility]     Patient Name: Eric Caldera  : 1960  MRN: [id number]  Today's Date: 4/3/2018  Onset of Illness/Injury or Date of Surgery: 18  Date of Referral to OT: 18  Referring Physician: Dr. Almaraz    Admit Date: 3/30/2018       ICD-10-CM ICD-9-CM   1. Lumbar disc herniation with radiculopathy M51.16 722.10   2. Decreased activities of daily living (ADL) Z78.9 V49.89   3. Impaired mobility Z74.09 799.89     Patient Active Problem List   Diagnosis   • Degeneration of lumbar intervertebral disc   • Pain in both lower extremities   • Numbness in feet   • Claudication of both lower extremities   • BMI 28.0-28.9,adult   • Tobacco non-user   • Lumbar disc herniation with radiculopathy     Past Medical History:   Diagnosis Date   • Arthritis    • Depression    • Hypertension    • Kidney stones    • Migraine    • Thyroid disease      Past Surgical History:   Procedure Laterality Date   • ARM TENDON REPAIR Left     \"Rebuilding\" of tendons   • LEG SURGERY Right 2009    Removal of bone tumor   • LUMBAR SPINE SURGERY         Therapy Treatment    Therapy Treatment / Health Promotion    Treatment Time/Intention  Discipline: occupational therapy assistant (18 0746 : YEN Stallings)  Document Type: therapy note (daily note) (18 0746 : YEN Stallings)  Subjective Information: complains of, pain (18 0746 : YEN Stallings)  Patient Effort: good (18 0746 : YEN Stallings)  Existing Precautions/Restrictions: brace worn when out of bed, fall, spinal (18 0746 : YEN Stallings)  Plan of Care Review  Plan of Care Reviewed With: patient (18 0843 : Saniya N Anusha, RODRÍGUEZ/L)    Vitals/Pain/Safety  Pain Scale: Numbers Pre/Post-Treatment  Pain Scale: Numbers, Pretreatment: 0/10 - no pain (18 0746 : YEN Stallings)  Positioning and " Restraints  Pre-Treatment Position: in bed (04/03/18 0746 : Saniya Tylerin, RODRÍGUEZ/L)  Post Treatment Position: chair (04/03/18 0746 : Saniya Tylerin, RODRÍGUEZ/L)  In Chair: sitting, call light within reach, encouraged to call for assist, with brace (04/03/18 0746 : Saniya Tylerin, RODRÍGUEZ/L)    Mobility,ADL,Motor, Modality  Bed Mobility Assessment/Treatment  Bed Mobility Assessment/Treatment: sidelying-sit (04/03/18 0746 : Saniya Tylerin, RODRÍGUEZ/L)  Sidelying-Sit [place] (Bed Mobility): supervision (04/03/18 0746 : Saniya Tavares, RODRÍGUEZ/L)  Assistive Device (Bed Mobility): bed rails (04/03/18 0746 : Saniya Tylerin, RODRÍGUEZ/L)  Transfer Assessment/Treatment  Transfer Assessment/Treatment: sit-stand transfer, stand-sit transfer (04/03/18 0746 : Saniya Tylerin, RODRÍGUEZ/L)  Sit-Stand Transfer  Sit-Stand [place] (Transfers): supervision (04/03/18 0746 : Saniya Tylerin, RODRÍGUEZ/L)  Assistive Device (Sit-Stand Transfers): walker, front-wheeled (04/03/18 0746 : Saniya Tylerin, RODRÍGUEZ/L)  Stand-Sit Transfer  Stand-Sit [place] (Transfers): supervision (04/03/18 0746 : Saniya Tylerin, RODRÍGUEZ/L)  Assistive Device (Stand-Sit Transfers): walker, front-wheeled (04/03/18 0746 : Saniya Garberklin, RODRÍGUEZ/L)  ADL Assessment/Intervention  BADL Assessment/Intervention: upper body dressing, lower body dressing, bathing (04/03/18 0746 : Saniya Tavares, RODRÍGUEZ/L)  Bathing Assessment/Intervention  Bathing [place] Level: perineal area, set up (04/03/18 0746 : Saniya Tavares, RODRÍGUEZ/L)  Bathing Position: edge of bed sitting (04/03/18 0746 : Saniya Tavares, RODRÍGUEZ/L)  Upper Body Dressing Assessment/Training  Upper Body Dressing [place] Level: don, front opening garment, set up (LSO, suman gown) (04/03/18 0746 : ANNE Stallings/L)  Upper Body Dressing Position: edge of bed sitting, unsupported standing (04/03/18 0746 : ANNE Stallings/L)  Lower Body Dressing  Assessment/Training  Lower Body Dressing [place] Level: don, pants/bottoms, undergarment, supervision (04/03/18 0746 : ANNE Stallings/L)  Lower Body Dressing Position: edge of bed sitting, unsupported standing (04/03/18 0746 : ANNE Stallings/JAMES)              ROM/MMT             Sensory, Edema, Orthotics          Cognition, Communication, Swallow  Cognitive Assessment/Intervention- PT/OT  Personal Safety Interventions: fall prevention program maintained, gait belt, nonskid shoes/slippers when out of bed (04/03/18 0746 : ANNE Stallings/L)    Outcome Summary           OT Rehab Goals     Row Name 04/03/18 0800 03/31/18 0800          Transfer Goal 1 (OT)    Activity/Assistive Device (Transfer Goal 1, OT)  -- tub  -AC     [place] Level/Cues Needed (Transfer Goal 1, OT)  -- contact guard assist  -AC     Time Frame (Transfer Goal 1, OT)  -- long term goal (LTG);10 days  -AC     Progress/Outcome (Transfer Goal 1, OT)  -- goal ongoing  -AC        Bathing Goal 1 (OT)    Activity/Assistive Device (Bathing Goal 1, OT)  -- lower body bathing  -AC     [place] Level/Cues Needed (Bathing Goal 1, OT)  -- minimum assist (75% or more patient effort)  -AC     Time Frame (Bathing Goal 1, OT)  -- long term goal (LTG);10 days  -AC     Progress/Outcomes (Bathing Goal 1, OT)  -- goal ongoing  -AC        Dressing Goal 1 (OT)    Activity/Assistive Device (Dressing Goal 1, OT)  -- lower body dressing  -AC     [place]/Cues Needed (Dressing Goal 1, OT)  -- minimum assist (75% or more patient effort)  -AC     Time Frame (Dressing Goal 1, OT)  -- long term goal (LTG);10 days  -AC     Progress/Outcome (Dressing Goal 1, OT) goal met  -TS goal ongoing  -AC        Patient Education Goal (OT)    Activity (Patient Education Goal, OT)  -- spinal precautions, LSO wear/care  -AC     [place]/Cues/Accuracy (Memory Goal 2, OT)  -- demonstrates adequately;independent;verbalizes understanding  -AC     Time  Frame (Patient Education Goal, OT)  -- long term goal (LTG);10 days  -AC     Progress/Outcome (Patient Education Goal, OT)  -- goal ongoing  -AC       User Key  (r) = Recorded By, (t) = Taken By, (c) = Cosigned By    Initials Name Provider Type    AC Ariel Villarreal, OTR/L Occupational Therapist    TS Saniya MALU Tavares, RODRÍGUEZ/L Occupational Therapy Assistant        Occupational Therapy Education     Title: PT OT SLP Therapies (Active)     Topic: Occupational Therapy (Done)     Point: ADL training (Done)     Description: Instruct learner(s) on proper safety adaptation and remediation techniques during self care or transfers.   Instruct in proper use of assistive devices.   Learning Progress Summary     Learner Status Readiness Method Response Comment Documented by    Patient Done Acceptance E VU transfers, home safety, benefits of activity TS 04/03/18 0842     Done Acceptance E VU back precautions, LSO, transfers, DME TS 04/02/18 1027     Done Acceptance E VU spinal precautions, OT POC, pain mgmt via deep breathing AC 03/31/18 0916          Point: Precautions (Done)     Description: Instruct learner(s) on prescribed precautions during self-care and functional transfers.   Learning Progress Summary     Learner Status Readiness Method Response Comment Documented by    Patient Done Acceptance E VU transfers, home safety, benefits of activity TS 04/03/18 0842     Done Acceptance E VU back precautions, LSO, transfers, DME TS 04/02/18 1027     Done Acceptance E VU spinal precautions, OT POC, pain mgmt via deep breathing AC 03/31/18 0916          Point: Body mechanics (Done)     Description: Instruct learner(s) on proper positioning and spine alignment during self-care, functional mobility activities and/or exercises.   Learning Progress Summary     Learner Status Readiness Method Response Comment Documented by    Patient Done Acceptance E VU spinal precautions, OT POC, pain mgmt via deep breathing AC 03/31/18 0916                       User Key     Initials Effective Dates Name Provider Type Discipline     06/22/15 -  Ariel Villarreal, OTR/L Occupational Therapist OT    TS 08/02/16 -  ANNE Stallings/L Occupational Therapy Assistant OT                  OT Recommendation and Plan     Plan of Care Review  Plan of Care Reviewed With: patient  Plan of Care Reviewed With: patient  Outcome Summary: Pt S for UB/LB dressing. Pt able to don LSO independently and in correct position. Pt educated on home safety and work simplification. Pt plans to discharge home with son.         Outcome Measures     Row Name 04/03/18 0800 04/02/18 1000 04/02/18 0800       How much help from another person do you currently need...    Turning from your back to your side while in flat bed without using bedrails?  --  -- 3  -CW    Moving from lying on back to sitting on the side of a flat bed without bedrails?  --  -- 3  -CW    Moving to and from a bed to a chair (including a wheelchair)?  --  -- 3  -CW    Standing up from a chair using your arms (e.g., wheelchair, bedside chair)?  --  -- 3  -CW    Climbing 3-5 steps with a railing?  --  -- 3  -CW    To walk in hospital room?  --  -- 3  -CW    AM-PAC 6 Clicks Score  --  -- 18  -CW       How much help from another is currently needed...    Putting on and taking off regular lower body clothing? 3  -TS 2  -TS  --    Bathing (including washing, rinsing, and drying) 3  -TS 2  -TS  --    Toileting (which includes using toilet bed pan or urinal) 3  -TS 3  -TS  --    Putting on and taking off regular upper body clothing 3  -TS 3  -TS  --    Taking care of personal grooming (such as brushing teeth) 3  -TS 3  -TS  --    Eating meals 4  -TS 4  -TS  --    Score 19  -TS 17  -TS  --       Functional Assessment    Outcome Measure Options AM-PAC 6 Clicks Daily Activity (OT)  -TS AM-PAC 6 Clicks Daily Activity (OT)  -TS AM-PAC 6 Clicks Basic Mobility (PT)  -CW    Row Name 03/31/18 1500 03/31/18 0915          How much  help from another person do you currently need...    Turning from your back to your side while in flat bed without using bedrails? 3  -TB  --     Moving from lying on back to sitting on the side of a flat bed without bedrails? 3  -TB  --     Moving to and from a bed to a chair (including a wheelchair)? 3  -TB  --     Standing up from a chair using your arms (e.g., wheelchair, bedside chair)? 3  -TB  --     Climbing 3-5 steps with a railing? 3  -TB  --     To walk in hospital room? 3  -TB  --     AM-PAC 6 Clicks Score 18  -TB  --        How much help from another is currently needed...    Putting on and taking off regular lower body clothing?  -- 2  -AC     Bathing (including washing, rinsing, and drying)  -- 2  -AC     Toileting (which includes using toilet bed pan or urinal)  -- 3  -AC     Putting on and taking off regular upper body clothing  -- 2  -AC     Taking care of personal grooming (such as brushing teeth)  -- 3  -AC     Eating meals  -- 4  -AC     Score  -- 16  -AC        Functional Assessment    Outcome Measure Options  -- AM-PAC 6 Clicks Daily Activity (OT)  -AC       User Key  (r) = Recorded By, (t) = Taken By, (c) = Cosigned By    Initials Name Provider Type    TB Dilip Avila, PT Physical Therapist    AC Ariel Villarreal, OTR/L Occupational Therapist    TS Saniya Tavares RODRÍGUEZ/L Occupational Therapy Assistant     Sandee Campoverde, PTA Physical Therapy Assistant           Time Calculation:         Time Calculation- OT     Row Name 04/03/18 0845             Time Calculation- OT    OT Start Time 0740  -TS      OT Stop Time 0804  -TS      OT Time Calculation (min) 24 min  -TS      Total Timed Code Minutes- OT 24 minute(s)  -TS      OT Received On 04/03/18  -TS        User Key  (r) = Recorded By, (t) = Taken By, (c) = Cosigned By    Initials Name Provider Type    TS Saniya Tavares, RODRÍGUEZ/L Occupational Therapy Assistant           Therapy Charges for Today     Code Description Service Date  Service Provider Modifiers Qty    49685558944 HC OT SELF CARE/MGMT/TRAIN EA 15 MIN 4/2/2018 ANNE Stallings/L GO, KX 2    [id number] HC OT SELF CARE/MGMT/TRAIN EA 15 MIN 4/3/2018 MAURI StallingsA/L GO, KX 2          OT G-codes  OT Professional Judgement Used?: Yes  OT Functional Scales Options: AM-PAC 6 Clicks Daily Activity (OT)  Score: 16  Functional Limitation: Self care  Self Care Current Status (): At least 40 percent but less than 60 percent impaired, limited or restricted  Self Care Goal Status (): At least 20 percent but less than 40 percent impaired, limited or restricted    ANNE Diaz/JAMES  4/3/2018

## 2018-04-03 NOTE — THERAPY TREATMENT NOTE
Acute Care - Physical Therapy Treatment Note  [facility]     Patient Name: Eric Caldera  : 1960  MRN: [id number]  Today's Date: 4/3/2018  Onset of Illness/Injury or Date of Surgery: 18  Date of Referral to PT: 18  Referring Physician: Dr. Almaraz    Admit Date: 3/30/2018    Visit Dx:    ICD-10-CM ICD-9-CM   1. Lumbar disc herniation with radiculopathy M51.16 722.10   2. Decreased activities of daily living (ADL) Z78.9 V49.89   3. Impaired mobility Z74.09 799.89     Patient Active Problem List   Diagnosis   • Degeneration of lumbar intervertebral disc   • Pain in both lower extremities   • Numbness in feet   • Claudication of both lower extremities   • BMI 28.0-28.9,adult   • Tobacco non-user   • Lumbar disc herniation with radiculopathy       Therapy Treatment    Therapy Treatment / Health Promotion    Treatment Time/Intention  Discipline: physical therapy assistant (18 0816 : Sandee Campoverde PTA)  Document Type: therapy note (daily note) (18 0816 : Sandee Campoverde PTA)  Subjective Information: complains of, pain (18 0816 : Sandee Campoverde PTA)  Patient Effort: good (18 08 : Sandee Campoverde PTA)  Existing Precautions/Restrictions: brace worn when out of bed, spinal (18 0816 : Sandee Campoverde PTA)  Plan of Care Review  Plan of Care Reviewed With: patient (18 0900 : Sandee Campoverde PTA)    Vitals/Pain/Safety  Pain Scale: Numbers Pre/Post-Treatment  Pain Scale: Numbers, Pretreatment: 7/10 (18 08 : Sandee Campoverde PTA)  Pain Scale: Numbers, Post-Treatment: 8/10 (18 08 : Sandee Campoverde PTA)  Pain Location - Side: Bilateral (18 08 : Sandee Campoverde PTA)  Pain Location - Orientation: lower (18 08 : Sandee Campoverde PTA)  Pain Location: back (18 0816 : Sandee Campoverde PTA)  Pre/Post Treatment Pain Comment: back and B hips (18 0816 : Sandee Campoverde PTA)  Pain  Intervention(s): Medication (See MAR), Ambulation/increased activity (04/03/18 0816 : Sandee Campoverde PTA)  Pain Scale: Word Pre/Post-Treatment  Pain Location - Side: Bilateral (04/03/18 0816 : Sandee Campoverde PTA)  Pain Location - Orientation: lower (04/03/18 0816 : Sandee Campoverde, PTA)  Pain Location: back (04/03/18 0816 : Sandee Campoverde PTA)  Pain Intervention(s): Medication (See MAR), Ambulation/increased activity (04/03/18 0816 : Sandee Campoverde, PTA)  Pain Scale: FACES Pre/Post-Treatment  Pain Location - Side: Bilateral (04/03/18 0816 : Sandee Campoverde PTA)  Pain Location - Orientation: lower (04/03/18 0816 : Sandee Campoverde, PTA)  Pain Location: back (04/03/18 0816 : Sandee Campoverde PTA)  Pain Intervention(s): Medication (See MAR), Ambulation/increased activity (04/03/18 0816 : Sandee Campoverde PTA)  Positioning and Restraints  Pre-Treatment Position: sitting in chair/recliner (04/03/18 0816 : Sandee Campoverde PTA)  Post Treatment Position: bed (04/03/18 0816 : Sandee Campoverde PTA)  In Bed: supine, call light within reach, encouraged to call for assist, with nsg (04/03/18 0816 : Sandee Campoverde PTA)    Mobility,ADL,Motor, Modality  Bed Mobility Assessment/Treatment  Sit-Sidelying Mcintosh (Bed Mobility): independent (04/03/18 0816 : Sandee Campoverde PTA)  Sit-Stand Transfer  Sit-Stand Mcintosh (Transfers): conditional independence (04/03/18 0816 : Sandee Campoverde PTA)  Assistive Device (Sit-Stand Transfers): walker, front-wheeled (04/03/18 0816 : Sandee Campoverde PTA)  Stand-Sit Transfer  Stand-Sit Mcintosh (Transfers): conditional independence (04/03/18 0816 : Sandee Campoverde PTA)  Assistive Device (Stand-Sit Transfers): walker, front-wheeled (04/03/18 0816 : Sandee Campoverde PTA)  Gait/Stairs Assessment/Training  Mcintosh Level (Gait): conditional independence (04/03/18 0816 : Sandee Campoverde PTA)  Assistive Device (Gait):  walker, front-wheeled (04/03/18 0816 : Sandee Campoverde PTA)  Distance in Feet (Gait): 200 (04/03/18 0816 : Sandee Campoverde PTA)  Deviations/Abnormal Patterns (Gait): antalgic, jayson decreased (04/03/18 0816 : Sandee Campoverde PTA)  Bilateral Gait Deviations: forward flexed posture, heel strike decreased (04/03/18 0816 : Sandee Campoverde PTA)     Therapeutic Exercise  Lower Extremity (Therapeutic Exercise): LAQ (long arc quad), bilateral, marching while seated (04/03/18 0816 : Sandee Campoverde PTA)  Lower Extremity Range of Motion (Therapeutic Exercise): ankle dorsiflexion/plantar flexion, bilateral (04/03/18 0816 : Sandee Campoverde PTA)  Exercise Type (Therapeutic Exercise): AROM (active range of motion) (04/03/18 0816 : Sandee Campoverde PTA)  Position (Therapeutic Exercise): seated (04/03/18 0816 : Sandee Campoverde PTA)  Sets/Reps (Therapeutic Exercise): 20 (04/03/18 0816 : Sandee Campoverde PTA)           ROM/MMT             Sensory, Edema, Orthotics          Cognition, Communication, Swallow  Cognitive Assessment/Intervention- PT/OT  Personal Safety Interventions: fall prevention program maintained, gait belt, nonskid shoes/slippers when out of bed (04/03/18 0816 : Sandee Campoverde PTA)    Outcome Summary               PT Rehab Goals     Row Name 03/31/18 1500 03/31/18 0830          Bed Mobility Goal 1 (PT)    Activity/Assistive Device (Bed Mobility Goal 1, PT)  -- bed mobility activities, all  -TB     [place] Level/Cues Needed (Bed Mobility Goal 1, PT)  -- independent  -TB     Time Frame (Bed Mobility Goal 1, PT)  -- by discharge  -TB     Barriers (Bed Mobility Goal 1, PT)  -- pain  -TB     Progress/Outcomes (Bed Mobility Goal 1, PT)  -- good progress toward goal  -TB        Transfer Goal 1 (PT)    Activity/Assistive Device (Transfer Goal 1, PT)  -- transfers, all  -TB     [place] Level/Cues Needed (Transfer Goal 1, PT)  -- supervision required  -TB     Time Frame  (Transfer Goal 1, PT)  -- by discharge  -TB     Barriers (Transfers Goal 1, PT)  -- pain  -TB     Progress/Outcome (Transfer Goal 1, PT)  -- good progress toward goal  -TB        Gait Training Goal 1 (PT)    Activity/Assistive Device (Gait Training Goal 1, PT) gait (walking locomotion)  -TB gait (walking locomotion)  -TB     [place] Level (Gait Training Goal 1, PT) supervision required  -TB supervision required  -TB     Distance (Gait Goal 1, PT) 200  -  -TB     Time Frame (Gait Training Goal 1, PT) by discharge  -TB by discharge  -TB     Barriers (Gait Training Goal 1, PT) pain  -TB pain  -TB     Progress/Outcome (Gait Training Goal 1, PT) good progress toward goal  -TB good progress toward goal  -TB        Patient Education Goal (PT)    Activity (Patient Education Goal, PT)  -- Independent with don/doff/use of LSO  -TB     [place]/Cues/Accuracy (Memory Goal 2, PT)  -- demonstrates adequately  -TB     Time Frame (Patient Education Goal, PT)  -- by discharge  -TB     Barriers (Patient Education Goal, PT)  -- pain  -TB     Progress/Outcome (Patient Education Goal, PT)  -- good progress toward goal  -TB       User Key  (r) = Recorded By, (t) = Taken By, (c) = Cosigned By    Initials Name Provider Type    TB Dilip Avila PT Physical Therapist          Physical Therapy Education     Title: PT OT SLP Therapies (Done)     Topic: Physical Therapy (Done)     Point: Mobility training (Done)    Learning Progress Summary     Learner Status Readiness Method Response Comment Documented by    Patient Done Acceptance E VU,DU bed mobility, transfers, gait, spinal precautions, exercises CW 04/03/18 0859     Active Acceptance E,D NR bed mobility, transfers, gait, spinal precautions CW 04/02/18 0853     Active Acceptance E NR log rolling, don/doffing LSO KJ 04/01/18 1030     Active Acceptance E NR  TB 03/31/18 0912          Point: Home exercise program (Done)    Learning Progress Summary     Learner Status  Readiness Method Response Comment Documented by    Patient Done Acceptance E VU,DU bed mobility, transfers, gait, spinal precautions, exercises  04/03/18 0859     Active Acceptance E,D NR bed mobility, transfers, gait, spinal precautions CW 04/02/18 0853     Active Acceptance E NR log rolling, don/doffing LSO KJ 04/01/18 1030     Active Acceptance E NR   03/31/18 0912          Point: Body mechanics (Done)    Learning Progress Summary     Learner Status Readiness Method Response Comment Documented by    Patient Done Acceptance E VU,DU bed mobility, transfers, gait, spinal precautions, exercises  04/03/18 0859     Active Acceptance E,D NR bed mobility, transfers, gait, spinal precautions  04/02/18 0853     Active Acceptance E NR log rolling, don/doffing LSO KJ 04/01/18 1030     Active Acceptance E NR   03/31/18 0912          Point: Precautions (Done)    Learning Progress Summary     Learner Status Readiness Method Response Comment Documented by    Patient Done Acceptance E VU,DU bed mobility, transfers, gait, spinal precautions, exercises  04/03/18 0859     Active Acceptance E,D NR bed mobility, transfers, gait, spinal precautions  04/02/18 0853     Active Acceptance E NR log rolling, don/doffing LSO KJ 04/01/18 1030     Active Acceptance E NR   03/31/18 0912                      User Key     Initials Effective Dates Name Provider Type Discipline     08/02/16 -  Dilip Avila, PT Physical Therapist PT     08/02/16 -  Ximena Delcid, PTA Physical Therapy Assistant PT     06/22/15 -  Sandee Campoverde, PTA Physical Therapy Assistant PT                    PT Recommendation and Plan     Plan of Care Reviewed With: patient  Progress: improving  Outcome Summary: Pt up in chair upon entering room.  Independent for sit to stand transfers and gait.  Ambulated 200' with rolling walker and completed active sitting BLE exercises.  Plans to dischage home today with family.          Outcome Measures     Row  Name 04/03/18 0900 04/03/18 0800 04/02/18 1000       How much help from another person do you currently need...    Turning from your back to your side while in flat bed without using bedrails? 4  -CW  --  --    Moving from lying on back to sitting on the side of a flat bed without bedrails? 4  -CW  --  --    Moving to and from a bed to a chair (including a wheelchair)? 4  -CW  --  --    Standing up from a chair using your arms (e.g., wheelchair, bedside chair)? 4  -CW  --  --    Climbing 3-5 steps with a railing? 3  -CW  --  --    To walk in hospital room? 4  -CW  --  --    AM-PAC 6 Clicks Score 23  -CW  --  --       How much help from another is currently needed...    Putting on and taking off regular lower body clothing?  -- 3  -TS 2  -TS    Bathing (including washing, rinsing, and drying)  -- 3  -TS 2  -TS    Toileting (which includes using toilet bed pan or urinal)  -- 3  -TS 3  -TS    Putting on and taking off regular upper body clothing  -- 3  -TS 3  -TS    Taking care of personal grooming (such as brushing teeth)  -- 3  -TS 3  -TS    Eating meals  -- 4  -TS 4  -TS    Score  -- 19  -TS 17  -TS       Functional Assessment    Outcome Measure Options AM-PAC 6 Clicks Basic Mobility (PT)  -CW AM-PAC 6 Clicks Daily Activity (OT)  -TS AM-PAC 6 Clicks Daily Activity (OT)  -TS    Row Name 04/02/18 0800 03/31/18 1500 03/31/18 0915       How much help from another person do you currently need...    Turning from your back to your side while in flat bed without using bedrails? 3  -CW 3  -TB  --    Moving from lying on back to sitting on the side of a flat bed without bedrails? 3  -CW 3  -TB  --    Moving to and from a bed to a chair (including a wheelchair)? 3  -CW 3  -TB  --    Standing up from a chair using your arms (e.g., wheelchair, bedside chair)? 3  -CW 3  -TB  --    Climbing 3-5 steps with a railing? 3  -CW 3  -TB  --    To walk in hospital room? 3  -CW 3  -TB  --    AM-PAC 6 Clicks Score 18  -CW 18  -TB  --        How much help from another is currently needed...    Putting on and taking off regular lower body clothing?  --  -- 2  -AC    Bathing (including washing, rinsing, and drying)  --  -- 2  -AC    Toileting (which includes using toilet bed pan or urinal)  --  -- 3  -AC    Putting on and taking off regular upper body clothing  --  -- 2  -AC    Taking care of personal grooming (such as brushing teeth)  --  -- 3  -AC    Eating meals  --  -- 4  -AC    Score  --  -- 16  -AC       Functional Assessment    Outcome Measure Options AM-PAC 6 Clicks Basic Mobility (PT)  -CW  -- AM-PAC 6 Clicks Daily Activity (OT)  -AC      User Key  (r) = Recorded By, (t) = Taken By, (c) = Cosigned By    Initials Name Provider Type    TB Dilip Avila, PT Physical Therapist    AC Ariel Villarreal, OTR/L Occupational Therapist    TS Saniya Tavares, RODRÍGUEZ/L Occupational Therapy Assistant    CW Sandee Campoverde PTA Physical Therapy Assistant           Time Calculation:         PT Charges     Row Name 04/03/18 0902             Time Calculation    Start Time 0816  -CW      Stop Time 0845  -CW      Time Calculation (min) 29 min  -CW      PT Received On 04/03/18  -CW      PT Goal Re-Cert Due Date 04/10/18  -CW         Time Calculation- PT    Total Timed Code Minutes- PT 29 minute(s)  -CW        User Key  (r) = Recorded By, (t) = Taken By, (c) = Cosigned By    Initials Name Provider Type    BRITTNEY Campoverde PTA Physical Therapy Assistant          Therapy Charges for Today     Code Description Service Date Service Provider Modifiers Qty    33861864723 HC PT THER PROC EA 15 MIN 4/2/2018 Sandee Campoverde PTA GP, KX 1    22792011596 HC GAIT TRAINING EA 15 MIN 4/2/2018 Sandee Campoverde PTA GP, KX 1    08401388200 HC GAIT TRAINING EA 15 MIN 4/2/2018 Sandee Campoverde PTA GP, KX 2    49908215758 HC PT THER PROC EA 15 MIN 4/3/2018 Sandee Campoverde PTA GP, KX 1    01033613607 HC GAIT TRAINING EA 15 MIN 4/3/2018 AMADEO Camara,  KX 1          PT G-Codes  PT Professional Judgement Used?: Yes  Outcome Measure Options: AM-PAC 6 Clicks Basic Mobility (PT)  Score: 18  Functional Limitation: Mobility: Walking and moving around  Mobility: Walking and Moving Around Current Status (): At least 40 percent but less than 60 percent impaired, limited or restricted  Mobility: Walking and Moving Around Goal Status (): At least 20 percent but less than 40 percent impaired, limited or restricted    Sandee Campoverde, PTA  4/3/2018

## 2018-04-03 NOTE — PLAN OF CARE
Problem: Patient Care Overview  Goal: Plan of Care Review  Outcome: Ongoing (interventions implemented as appropriate)   04/03/18 0320   Coping/Psychosocial   Plan of Care Reviewed With patient   Plan of Care Review   Progress improving   OTHER   Outcome Summary States he is ready to go home. Looks like he feels better. Doing better with ambulation. Pleasant       Problem: Laminectomy/Foraminotomy/Discectomy (Adult)  Goal: Signs and Symptoms of Listed Potential Problems Will be Absent, Minimized or Managed (Laminectomy/Foraminotomy/Discectomy)  Outcome: Ongoing (interventions implemented as appropriate)      Problem: Fall Risk (Adult)  Goal: Absence of Fall  Outcome: Ongoing (interventions implemented as appropriate)

## 2018-04-03 NOTE — DISCHARGE SUMMARY
Date of Discharge:  4/3/2018    Discharge Diagnosis: Repeat herniation of L5/1 nucleus pulposus.  Patient Active Problem List   Diagnosis   • Degeneration of lumbar intervertebral disc   • Pain in both lower extremities   • Numbness in feet   • Claudication of both lower extremities   • BMI 28.0-28.9,adult   • Tobacco non-user     1. Lumbar disc herniation with radiculopathy    2. Decreased activities of daily living (ADL)    3. Impaired mobility        Presenting Problem/History of Present Illness  Lumbar disc herniation with radiculopathy [M51.16]  Lumbar disc herniation with radiculopathy [M51.16]   1. Lumbar disc herniation with radiculopathy    2. Decreased activities of daily living (ADL)    3. Impaired mobility        Hospital Course  Patient is a 57 y.o. male presented with Recurrent herniation of his L5/S1 intervertebral disc resulting in S1 radiculopathy.  The patient previously had a discectomy performed in the 1980s.  He now presents with recurrent disc herniation.  We discussed the risks benefits and possible complications of conservative management versus repeat discectomy versus fusion.  The patient elected to proceed with fusion.    He was brought to the main operating room and underwent an uneventful anterior lumbar interbody fusion that was assisted by Dr. Robb as well as a posterior L5 to S1 pedicle screw instrumentation and hemilaminotomy with discectomy.  The patient awoke and had complete resolution of his leg symptoms.    He had a small amount of ileus postoperatively which resolved with mag citrate and Senokot.  Otherwise the patient ambulated over 3-4 days and his pain was transitioned from IV pain medication to oral pain medication without difficulty.  He was evaluated by physical therapy and occupational therapy and deemed safe for discharge home with a rolling walker.      Procedures Performed  Procedure(s):  LUMBAR LAMINECTOMY ANTERIOR LUMBAR INTERBODY FUSION, Lumbar 5 to sacral 1  anterior interbody fusion and then posterior MIS L5 to S1 left decompression and instrumentation. O-arm  ANTERIOR LUMBAR EXPOSURE       Consults:   Consults     No orders found from 3/1/2018 to 3/31/2018.          Pertinent Test Results: Intraoperative images with excellent expansion of the bilateral L5/S1 foramen and good placement of instrumentation.    Condition on Discharge:  Improved    Vital Signs  Temp:  [97.6 °F (36.4 °C)-98.9 °F (37.2 °C)] 98.3 °F (36.8 °C)  Heart Rate:  [72-87] 72  Resp:  [16-20] 18  BP: ()/(55-79) 106/55    Physical Exam:   Physical Exam   Constitutional: He is oriented to person, place, and time.   Eyes: EOM are normal. Pupils are equal, round, and reactive to light.   Neurological: He is oriented to person, place, and time.   Psychiatric: His speech is normal.        Neurologic Exam     Mental Status   Oriented to person, place, and time.   Speech: speech is normal   Level of consciousness: alert    Cranial Nerves     CN III, IV, VI   Pupils are equal, round, and reactive to light.  Extraocular motions are normal.     CN V   Facial sensation intact.     CN VII   Facial expression full, symmetric.     Motor Exam   Right arm pronator drift: absent  Left arm pronator drift: absent    Strength   Right deltoid: 5/5  Left deltoid: 5/5  Right biceps: 5/5  Left biceps: 5/5  Right triceps: 5/5  Left triceps: 5/5  Right iliopsoas: 5/5  Left iliopsoas: 5/5  Right quadriceps: 5/5  Left quadriceps: 5/5  Right gastroc: 5/5  Left gastroc: 5/5    Sensory Exam   Light touch normal.    incisions clean dry and intact     Discharge Disposition  Home or Self Care    Discharge Medications   Eric Caldera   Home Medication Instructions ALISTAIR:[id number]    Printed on:04/03/18 1401   Medication Information                      acetaminophen (TYLENOL) 325 MG tablet  Take 2 tablets by mouth Every 6 (Six) Hours for 30 days.             diazePAM (VALIUM) 5 MG tablet  Take 1 tablet by mouth Every 8  (Eight) Hours As Needed for Muscle Spasms.             gabapentin (NEURONTIN) 100 MG capsule  Take 1 capsule by mouth Every 8 (Eight) Hours for 30 days.             hydrOXYzine (ATARAX) 25 MG tablet  Take 25 mg by mouth 3 (Three) Times a Day As Needed for Itching.             levothyroxine (SYNTHROID, LEVOTHROID) 75 MCG tablet  Take 100 mcg by mouth Daily.             oxyCODONE (ROXICODONE) 15 MG immediate release tablet  Take 1 tablet by mouth Every 4 (Four) Hours As Needed for Moderate Pain  (Begin to space out medication) for up to 8 days.             sennosides-docusate sodium (SENOKOT-S) 8.6-50 MG tablet  Take 2 tablets by mouth 2 (Two) Times a Day for 30 days. While taking pain medications to prevent constipation.             sertraline (ZOLOFT) 50 MG tablet  Take 150 mg by mouth Daily.                 Discharge Diet:  regular    Activity at Discharge:  as tolerated    Follow-up Appointments  No future appointments.  Additional Instructions for the Follow-ups that You Need to Schedule     Ambulatory Referral to Occupational Therapy    As directed      Ambulatory Referral to Physical Therapy    As directed            Test Results Pending at Discharge       Mehran Almaraz MD  04/03/18  2:01 PM    Time: Discharge 20 min

## 2018-04-04 ENCOUNTER — TELEPHONE (OUTPATIENT)
Dept: NEUROSURGERY | Facility: CLINIC | Age: 58
End: 2018-04-04

## 2018-04-04 DIAGNOSIS — Z98.1 S/P LUMBAR FUSION: Primary | ICD-10-CM

## 2018-04-04 RX ORDER — HYDROCODONE BITARTRATE AND ACETAMINOPHEN 10; 325 MG/1; MG/1
1 TABLET ORAL EVERY 4 HOURS PRN
Qty: 20 TABLET | Refills: 0 | Status: SHIPPED | OUTPATIENT
Start: 2018-04-04 | End: 2018-04-05 | Stop reason: SDUPTHER

## 2018-04-04 NOTE — THERAPY DISCHARGE NOTE
Acute Care - Occupational Therapy Discharge Summary  [facility]     Patient Name: Eric Caldera  : 1960  MRN: [id number]    Today's Date: 2018       Date of Referral to OT: 18         Admit Date: 3/30/2018        OT Recommendation and Plan    Visit Dx:    ICD-10-CM ICD-9-CM   1. Lumbar disc herniation with radiculopathy M51.16 722.10   2. Decreased activities of daily living (ADL) Z78.9 V49.89   3. Impaired mobility Z74.09 799.89                     OT Rehab Goals     Row Name 18 0700 18 0800 18 0800       Transfer Goal 1 (OT)    Activity/Assistive Device (Transfer Goal 1, OT)  --  -- tub  -AC    [place] Level/Cues Needed (Transfer Goal 1, OT)  --  -- contact guard assist  -AC    Time Frame (Transfer Goal 1, OT)  --  -- long term goal (LTG);10 days  -AC    Progress/Outcome (Transfer Goal 1, OT) goal not met  -TS  -- goal ongoing  -AC       Bathing Goal 1 (OT)    Activity/Assistive Device (Bathing Goal 1, OT)  --  -- lower body bathing  -AC    [place] Level/Cues Needed (Bathing Goal 1, OT)  --  -- minimum assist (75% or more patient effort)  -AC    Time Frame (Bathing Goal 1, OT)  --  -- long term goal (LTG);10 days  -AC    Progress/Outcomes (Bathing Goal 1, OT) goal not met  -TS  -- goal ongoing  -AC       Dressing Goal 1 (OT)    Activity/Assistive Device (Dressing Goal 1, OT)  --  -- lower body dressing  -AC    [place]/Cues Needed (Dressing Goal 1, OT)  --  -- minimum assist (75% or more patient effort)  -AC    Time Frame (Dressing Goal 1, OT)  --  -- long term goal (LTG);10 days  -AC    Progress/Outcome (Dressing Goal 1, OT)  -- goal met  -TS goal ongoing  -AC       Patient Education Goal (OT)    Activity (Patient Education Goal, OT)  --  -- spinal precautions, LSO wear/care  -AC    [place]/Cues/Accuracy (Memory Goal 2, OT)  --  -- demonstrates adequately;independent;verbalizes understanding  -AC    Time Frame (Patient Education Goal, OT)  --  -- long term  goal (LTG);10 days  -AC    Progress/Outcome (Patient Education Goal, OT) goal not met  -TS  -- goal ongoing  -AC      User Key  (r) = Recorded By, (t) = Taken By, (c) = Cosigned By    Initials Name Provider Type    AC Ariel Villarreal, OTR/L Occupational Therapist    TS Saniya Tavares, RODRÍGUEZ/L Occupational Therapy Assistant                Outcome Measures     Row Name 04/03/18 0900 04/03/18 0800 04/02/18 1000       How much help from another person do you currently need...    Turning from your back to your side while in flat bed without using bedrails? 4  -CW  --  --    Moving from lying on back to sitting on the side of a flat bed without bedrails? 4  -CW  --  --    Moving to and from a bed to a chair (including a wheelchair)? 4  -CW  --  --    Standing up from a chair using your arms (e.g., wheelchair, bedside chair)? 4  -CW  --  --    Climbing 3-5 steps with a railing? 3  -CW  --  --    To walk in hospital room? 4  -CW  --  --    AM-PAC 6 Clicks Score 23  -CW  --  --       How much help from another is currently needed...    Putting on and taking off regular lower body clothing?  -- 3  -TS 2  -TS    Bathing (including washing, rinsing, and drying)  -- 3  -TS 2  -TS    Toileting (which includes using toilet bed pan or urinal)  -- 3  -TS 3  -TS    Putting on and taking off regular upper body clothing  -- 3  -TS 3  -TS    Taking care of personal grooming (such as brushing teeth)  -- 3  -TS 3  -TS    Eating meals  -- 4  -TS 4  -TS    Score  -- 19  -TS 17  -TS       Functional Assessment    Outcome Measure Options AM-PAC 6 Clicks Basic Mobility (PT)  -CW AM-PAC 6 Clicks Daily Activity (OT)  -TS AM-PAC 6 Clicks Daily Activity (OT)  -TS    Row Name 04/02/18 0800             How much help from another person do you currently need...    Turning from your back to your side while in flat bed without using bedrails? 3  -CW      Moving from lying on back to sitting on the side of a flat bed without bedrails? 3  -CW       Moving to and from a bed to a chair (including a wheelchair)? 3  -CW      Standing up from a chair using your arms (e.g., wheelchair, bedside chair)? 3  -CW      Climbing 3-5 steps with a railing? 3  -CW      To walk in hospital room? 3  -CW      AM-PAC 6 Clicks Score 18  -CW         Functional Assessment    Outcome Measure Options AM-PAC 6 Clicks Basic Mobility (PT)  -CW        User Key  (r) = Recorded By, (t) = Taken By, (c) = Cosigned By    Initials Name Provider Type    TS YEN Stallings Occupational Therapy Assistant    CW Sandee Campoverde, PTA Physical Therapy Assistant          Therapy Charges for Today     Code Description Service Date Service Provider Modifiers Qty    29798337944 HC OT SELF CARE/MGMT/TRAIN EA 15 MIN 4/3/2018 YEN Stallings KX 2          OT Discharge Summary  Reason for Discharge: Discharge from facility  Outcomes Achieved: Refer to plan of care for updates on goals achieved  Discharge Destination: Home with assist      YEN Diaz  4/4/2018

## 2018-04-04 NOTE — THERAPY DISCHARGE NOTE
Acute Care - Physical Therapy Discharge Summary  [facility]       Patient Name: Eric Caldera  : 1960  MRN: [id number]    Today's Date: 2018  Onset of Illness/Injury or Date of Surgery: 18    Date of Referral to PT: 18  Referring Physician: Dr. Almaraz      Admit Date: 3/30/2018      PT Recommendation and Plan    Visit Dx:    ICD-10-CM ICD-9-CM   1. Lumbar disc herniation with radiculopathy M51.16 722.10   2. Decreased activities of daily living (ADL) Z78.9 V49.89   3. Impaired mobility Z74.09 799.89             Outcome Measures     Row Name 18 0900 18 0800 18 1000       How much help from another person do you currently need...    Turning from your back to your side while in flat bed without using bedrails? 4  -CW  --  --    Moving from lying on back to sitting on the side of a flat bed without bedrails? 4  -CW  --  --    Moving to and from a bed to a chair (including a wheelchair)? 4  -CW  --  --    Standing up from a chair using your arms (e.g., wheelchair, bedside chair)? 4  -CW  --  --    Climbing 3-5 steps with a railing? 3  -CW  --  --    To walk in hospital room? 4  -CW  --  --    AM-PAC 6 Clicks Score 23  -CW  --  --       How much help from another is currently needed...    Putting on and taking off regular lower body clothing?  -- 3  -TS 2  -TS    Bathing (including washing, rinsing, and drying)  -- 3  -TS 2  -TS    Toileting (which includes using toilet bed pan or urinal)  -- 3  -TS 3  -TS    Putting on and taking off regular upper body clothing  -- 3  -TS 3  -TS    Taking care of personal grooming (such as brushing teeth)  -- 3  -TS 3  -TS    Eating meals  -- 4  -TS 4  -TS    Score  -- 19  -TS 17  -TS       Functional Assessment    Outcome Measure Options AM-PAC 6 Clicks Basic Mobility (PT)  -CW AM-PAC 6 Clicks Daily Activity (OT)  -TS AM-PAC 6 Clicks Daily Activity (OT)  -TS    Row Name 18 0800             How much help from another person do you  currently need...    Turning from your back to your side while in flat bed without using bedrails? 3  -CW      Moving from lying on back to sitting on the side of a flat bed without bedrails? 3  -CW      Moving to and from a bed to a chair (including a wheelchair)? 3  -CW      Standing up from a chair using your arms (e.g., wheelchair, bedside chair)? 3  -CW      Climbing 3-5 steps with a railing? 3  -CW      To walk in hospital room? 3  -CW      AM-PAC 6 Clicks Score 18  -CW         Functional Assessment    Outcome Measure Options AM-PAC 6 Clicks Basic Mobility (PT)  -CW        User Key  (r) = Recorded By, (t) = Taken By, (c) = Cosigned By    Initials Name Provider Type    ANNE Miranda/L Occupational Therapy Assistant    BRITTNEY Campoverde PTA Physical Therapy Assistant                      PT Rehab Goals     Row Name 04/04/18 0800 03/31/18 1500 03/31/18 0830       Bed Mobility Goal 1 (PT)    Activity/Assistive Device (Bed Mobility Goal 1, PT)  --  -- bed mobility activities, all  -TB    [place] Level/Cues Needed (Bed Mobility Goal 1, PT)  --  -- independent  -TB    Time Frame (Bed Mobility Goal 1, PT)  --  -- by discharge  -TB    Barriers (Bed Mobility Goal 1, PT)  --  -- pain  -TB    Progress/Outcomes (Bed Mobility Goal 1, PT)  --  -- good progress toward goal  -TB       Transfer Goal 1 (PT)    Activity/Assistive Device (Transfer Goal 1, PT)  --  -- transfers, all  -TB    [place] Level/Cues Needed (Transfer Goal 1, PT)  --  -- supervision required  -TB    Time Frame (Transfer Goal 1, PT)  --  -- by discharge  -TB    Barriers (Transfers Goal 1, PT)  --  -- pain  -TB    Progress/Outcome (Transfer Goal 1, PT) goal met  -NW  -- good progress toward goal  -TB       Gait Training Goal 1 (PT)    Activity/Assistive Device (Gait Training Goal 1, PT)  -- gait (walking locomotion)  -TB gait (walking locomotion)  -TB    [place] Level (Gait Training Goal 1, PT)  -- supervision required  -TB  supervision required  -TB    Distance (Gait Goal 1, PT)  -- 200  -  -TB    Time Frame (Gait Training Goal 1, PT)  -- by discharge  -TB by discharge  -TB    Barriers (Gait Training Goal 1, PT)  -- pain  -TB pain  -TB    Progress/Outcome (Gait Training Goal 1, PT) goal met  -NW good progress toward goal  -TB good progress toward goal  -TB       Patient Education Goal (PT)    Activity (Patient Education Goal, PT)  --  -- Independent with don/doff/use of LSO  -TB    [place]/Cues/Accuracy (Memory Goal 2, PT)  --  -- demonstrates adequately  -TB    Time Frame (Patient Education Goal, PT)  --  -- by discharge  -TB    Barriers (Patient Education Goal, PT)  --  -- pain  -TB    Progress/Outcome (Patient Education Goal, PT) goal met  -NW  -- good progress toward goal  -TB      User Key  (r) = Recorded By, (t) = Taken By, (c) = Cosigned By    Initials Name Provider Type    TB Dilip Avila, PT Physical Therapist    NW Anel Parr, AMADEO Physical Therapy Assistant              PT Discharge Summary  Anticipated Discharge Disposition (PT): home or self care  Reason for Discharge: Discharge from facility  Outcomes Achieved: Refer to plan of care for updates on goals achieved  Discharge Destination: Home      Anel Parr, AMADEO   4/4/2018

## 2018-04-04 NOTE — PAYOR COMM NOTE
"DC HOME 4-3-18    [id number]  NEED 4 DAYS APPROVED PLEASE  UR  [phone]  Yeimy Han (57 y.o. Male)     Date of Birth Social Security Number Address Home Phone MRN    1960  [address] [address] [phone] [id number]    Faith Marital Status          None        Admission Date Admission Type Admitting Provider Attending Provider Department, Room/Bed    3/30/18 Elective Mehran Almaraz MD  [facility] 3A, 351/1    Discharge Date Discharge Disposition Discharge Destination        4/3/2018 Home or Self Care              Attending Provider:  (none)   Allergies:  Naproxen, Nsaids, Prednisone    Isolation:  None   Infection:  None   Code Status:  Prior    Ht:  176.5 cm (69.49\")   Wt:  88.5 kg (195 lb 1.7 oz)    Admission Cmt:  None   Principal Problem:  None                Active Insurance as of 3/30/2018     Primary Coverage     Payor Plan Insurance Group Employer/Plan Group    [employer] [place] [employer] [place]      Payor Plan Address Payor Plan Phone Number Effective From Effective To    [address] [address] [phone] 1/25/2018     [place] [address]       Subscriber Name Subscriber Birth Date Member ID       YEIMY HAN 1960 [id number]                 Emergency Contacts      (Rel.) Home Phone Work Phone Mobile Phone    Solo Han (Son) -- -- [phone]               Physician Progress Notes (all)      Mehran Almaraz MD at 4/3/2018  1:55 PM          Neurosurgery Daily Progress Note    Assessment:   Yeimy Han is a 57 y.o. yo with a significant PMH of htn and depression who presented with Nhan S1 and left L5 radiculopathy.  Imaging revealed recurrent L5/S1 disc herniation.      DDX:   Patient Active Problem List   Diagnosis   • Degeneration of lumbar intervertebral disc   • Pain in both lower extremities   • Numbness in feet   • Claudication of both lower extremities   • BMI 28.0-28.9,adult   • Tobacco non-user " "  • Lumbar disc herniation with radiculopathy       Plan:   Neuro: POD#4 from L5/S1 ALIF and Posterior instrumentation and left L5 hemilaminectomy  CV: DOREEN  Pulm: RA  :  Davis out  FEN: Shannan PO, HLIV  GI: Awaiting BM. Gina enema, KUB  ID: DOREEN  Heme:  DVT prophylaxis begins today  Pain: Pain controlled.  Gabapentin, valium scheduled.  oxycodone to 15 and schedule tylenol  Dispo: PT/OT, roll walker.  Home today      Chief complaint:   Back pain    Subjective  Complete resolution of leg symptoms.     Temp:  [97.6 °F (36.4 °C)-98.9 °F (37.2 °C)] 98.3 °F (36.8 °C)  Heart Rate:  [72-87] 72  Resp:  [16-20] 18  BP: ()/(55-79) 106/55    Objective:  Vital signs: (most recent): Blood pressure 106/55, pulse 72, temperature 98.3 °F (36.8 °C), temperature source Oral, resp. rate 18, height 176.5 cm (69.49\"), weight 88.5 kg (195 lb 1.7 oz), SpO2 93 %.        Neurologic Exam     Mental Status   Oriented to person, place, and time.   Speech: speech is normal   Level of consciousness: alert    Cranial Nerves     CN III, IV, VI   Pupils are equal, round, and reactive to light.  Extraocular motions are normal.     CN V   Facial sensation intact.     CN VII   Facial expression full, symmetric.     Motor Exam   Right arm pronator drift: absent  Left arm pronator drift: absent    Strength   Right deltoid: 5/5  Left deltoid: 5/5  Right biceps: 5/5  Left biceps: 5/5  Right triceps: 5/5  Left triceps: 5/5  Right iliopsoas: 5/5  Left iliopsoas: 5/5  Right quadriceps: 5/5  Left quadriceps: 5/5  Right gastroc: 5/5  Left gastroc: 5/5    Sensory Exam   Light touch normal.     Incision CDI    Drains:   [REMOVED] Urethral Catheter Non-latex;Silicone;Double-lumen 16 Fr. (Removed)   Removed 03/30/18 2000   Daily Indications Selected surgeries ( tract, abdomen) 3/30/2018  3:20 PM   Site Assessment Clean;Skin intact 3/30/2018  3:20 PM   Collection Container Standard drainage bag 3/30/2018  3:20 PM   Securement Method Securing device " "3/30/2018  3:20 PM   Output (mL) 1200 mL 3/30/2018  7:54 PM       Active Problems:    Lumbar disc herniation with radiculopathy      Lab Results (last 24 hours)     ** No results found for the last 24 hours. **          [id number]  Mehran Almaraz MD      Electronically signed by Mehran Almaraz MD at 4/3/2018  1:55 PM     Mehran Almaraz MD at 4/2/2018  8:27 AM          Neurosurgery Daily Progress Note    Assessment:   Eric Caldera is a 57 y.o. yo with a significant PMH of htn and depression who presented with Nhan S1 and left L5 radiculopathy.  Imaging revealed recurrent L5/S1 disc herniation.      DDX:   Patient Active Problem List   Diagnosis   • Degeneration of lumbar intervertebral disc   • Pain in both lower extremities   • Numbness in feet   • Claudication of both lower extremities   • BMI 28.0-28.9,adult   • Tobacco non-user   • Lumbar disc herniation with radiculopathy       Plan:   Neuro: POD#3 from L5/S1 ALIF and Posterior instrumentation and left L5 hemilaminectomy  CV: DOREEN  Pulm: RA  :  Davis out  FEN: Shannan PO, HLIV  GI: Awaiting BM. Fleets enema, KUB  ID: Post op Abx for 23 hours  Heme:  DVT prophylaxis begins today  Pain: Pain still poorly controlled.  Gabapentin, valium scheduled.  Increase oxycodone to 15 and schedule tylenol  Dispo: PT/OT, anticipate 4-5 days due to home situation      Chief complaint:   Back pain    Subjective  Complete resolution of leg symptoms.     Temp:  [97.9 °F (36.6 °C)-99.6 °F (37.6 °C)] 97.9 °F (36.6 °C)  Heart Rate:  [54-86] 81  Resp:  [16-18] 16  BP: (101-153)/(52-83) 129/83    Objective:  Vital signs: (most recent): Blood pressure 129/83, pulse 81, temperature 97.9 °F (36.6 °C), temperature source Oral, resp. rate 16, height 176.5 cm (69.49\"), weight 88.5 kg (195 lb 1.7 oz), SpO2 95 %.        Neurologic Exam     Mental Status   Oriented to person, place, and time.   Speech: speech is normal   Level of consciousness: alert    Cranial Nerves     CN " III, IV, VI   Pupils are equal, round, and reactive to light.  Extraocular motions are normal.     CN V   Facial sensation intact.     CN VII   Facial expression full, symmetric.     Motor Exam   Right arm pronator drift: absent  Left arm pronator drift: absent    Strength   Right deltoid: 5/5  Left deltoid: 5/5  Right biceps: 5/5  Left biceps: 5/5  Right triceps: 5/5  Left triceps: 5/5  Right iliopsoas: 5/5  Left iliopsoas: 5/5  Right quadriceps: 5/5  Left quadriceps: 5/5  Right gastroc: 5/5  Left gastroc: 5/5    Sensory Exam   Light touch normal.     Incision CDI    Drains:   [REMOVED] Urethral Catheter Non-latex;Silicone;Double-lumen 16 Fr. (Removed)   Removed 03/30/18 2000   Daily Indications Selected surgeries ( tract, abdomen) 3/30/2018  3:20 PM   Site Assessment Clean;Skin intact 3/30/2018  3:20 PM   Collection Container Standard drainage bag 3/30/2018  3:20 PM   Securement Method Securing device 3/30/2018  3:20 PM   Output (mL) 1200 mL 3/30/2018  7:54 PM       Active Problems:    Lumbar disc herniation with radiculopathy      Lab Results (last 24 hours)     ** No results found for the last 24 hours. **          [id number]  Mehran Almaraz MD      Electronically signed by Mehran Almaraz MD at 4/2/2018  8:29 AM     Mehran Almaraz MD at 4/1/2018 11:34 AM          Neurosurgery Daily Progress Note    Assessment:   Eric Caldera is a 57 y.o. yo with a significant PMH of htn and depression who presented with Nhan S1 and left L5 radiculopathy.  Imaging revealed recurrent L5/S1 disc herniation.      DDX:   Patient Active Problem List   Diagnosis   • Degeneration of lumbar intervertebral disc   • Pain in both lower extremities   • Numbness in feet   • Claudication of both lower extremities   • BMI 28.0-28.9,adult   • Tobacco non-user   • Lumbar disc herniation with radiculopathy       Plan:   Neuro: POD#1 from L5/S1 ALIF and Posterior instrumentation and left L5 hemilaminectomy  CV: DOREEN  Pulm:  "BUBBA  :  Davis out  FEN: Shannan PO, HLIV  GI: Awaiting BM  ID: Post op Abx for 23 hours  Heme:  DVT prophylaxis begins today  Pain: Gabapentin, valium scheduled.  Increase oxycodone to 15 and schedule tylenol  Dispo: PT/OT, anticipate 4-5 days due to home situation      Chief complaint:   Back pain    Subjective  Complete resolution of leg symptoms.     Temp:  [97.4 °F (36.3 °C)-100 °F (37.8 °C)] 98.5 °F (36.9 °C)  Heart Rate:  [83-98] 87  Resp:  [18-20] 18  BP: (105-126)/(52-71) 126/67    Objective:  Vital signs: (most recent): Blood pressure 126/67, pulse 87, temperature 98.5 °F (36.9 °C), temperature source Oral, resp. rate 18, height 176.5 cm (69.49\"), weight 88.5 kg (195 lb 1.7 oz), SpO2 95 %.        Neurologic Exam     Mental Status   Oriented to person, place, and time.   Speech: speech is normal   Level of consciousness: alert    Cranial Nerves     CN III, IV, VI   Pupils are equal, round, and reactive to light.  Extraocular motions are normal.     CN V   Facial sensation intact.     CN VII   Facial expression full, symmetric.     Motor Exam   Right arm pronator drift: absent  Left arm pronator drift: absent    Strength   Right deltoid: 5/5  Left deltoid: 5/5  Right biceps: 5/5  Left biceps: 5/5  Right triceps: 5/5  Left triceps: 5/5  Right iliopsoas: 5/5  Left iliopsoas: 5/5  Right quadriceps: 5/5  Left quadriceps: 5/5  Right gastroc: 5/5  Left gastroc: 5/5    Sensory Exam   Light touch normal.     Incision CDI    Drains:   [REMOVED] Urethral Catheter Non-latex;Silicone;Double-lumen 16 Fr. (Removed)   Removed 03/30/18 2000   Daily Indications Selected surgeries ( tract, abdomen) 3/30/2018  3:20 PM   Site Assessment Clean;Skin intact 3/30/2018  3:20 PM   Collection Container Standard drainage bag 3/30/2018  3:20 PM   Securement Method Securing device 3/30/2018  3:20 PM   Output (mL) 1200 mL 3/30/2018  7:54 PM       Active Problems:    Lumbar disc herniation with radiculopathy      Lab Results (last 24 hours)  "    ** No results found for the last 24 hours. **          [id number]  Mehran Almaraz MD      Electronically signed by Mehran Almaraz MD at 4/1/2018 11:38 AM     Mehran Almaraz MD at 3/31/2018  9:17 AM          Neurosurgery Daily Progress Note    Assessment:   Eric Caldera is a 57 y.o. yo with a significant PMH of htn and depression who presented with Nhan S1 and left L5 radiculopathy.  Imaging revealed recurrent L5/S1 disc herniation.      DDX:   Patient Active Problem List   Diagnosis   • Degeneration of lumbar intervertebral disc   • Pain in both lower extremities   • Numbness in feet   • Claudication of both lower extremities   • BMI 28.0-28.9,adult   • Tobacco non-user   • Lumbar disc herniation with radiculopathy       Plan:   Neuro: POD#1 from L5/S1 ALIF and Posterior instrumentation and left L5 hemilaminectomy  CV: DOREEN  Pulm: RA  :  Davis out  FEN: Shannan PO, HLIV  GI: Awaiting BM  ID: Post op Abx for 23 hours  Heme:  DVT prophylaxis begins today  Pain: DC PCA and transition to oral pain meds.  Can not tolerate NSAIDs  Dispo: PT/OT, anticipate 4-5 days due to home situation      Chief complaint:   Back pain    Subjective  Complete resolution of leg symptoms.     Temp:  [97.7 °F (36.5 °C)-98.9 °F (37.2 °C)] 98.6 °F (37 °C)  Heart Rate:  [] 88  Resp:  [14-21] 20  BP: (101-138)/(36-88) 115/74    Objective    Neurologic Exam     Mental Status   Oriented to person, place, and time.   Speech: speech is normal   Level of consciousness: alert    Cranial Nerves     CN III, IV, VI   Pupils are equal, round, and reactive to light.  Extraocular motions are normal.     CN V   Facial sensation intact.     CN VII   Facial expression full, symmetric.     Motor Exam   Right arm pronator drift: absent  Left arm pronator drift: absent    Strength   Right deltoid: 5/5  Left deltoid: 5/5  Right biceps: 5/5  Left biceps: 5/5  Right triceps: 5/5  Left triceps: 5/5  Right iliopsoas: 5/5  Left iliopsoas:  5/5  Right quadriceps: 5/5  Left quadriceps: 5/5  Right gastroc: 5/5  Left gastroc: 5/5    Sensory Exam   Light touch normal.     Incision CDI    Drains:   [REMOVED] Urethral Catheter Non-latex;Silicone;Double-lumen 16 Fr. (Removed)   Removed 03/30/18 2000   Daily Indications Selected surgeries ( tract, abdomen) 3/30/2018  3:20 PM   Site Assessment Clean;Skin intact 3/30/2018  3:20 PM   Collection Container Standard drainage bag 3/30/2018  3:20 PM   Securement Method Securing device 3/30/2018  3:20 PM   Output (mL) 1200 mL 3/30/2018  7:54 PM       Active Problems:    Lumbar disc herniation with radiculopathy      Lab Results (last 24 hours)     ** No results found for the last 24 hours. **          [id number]  Mehran Almaraz MD      Electronically signed by Mehran Almaraz MD at 3/31/2018  9:21 AM       Consult Notes (all)     No notes of this type exist for this encounter.        Discharge Summary     No notes of this type exist for this encounter.

## 2018-04-04 NOTE — TELEPHONE ENCOUNTER
Patient calls after hours last night explaining that his insurance is not covering his Roxicodone 15mg Rx. I have attempted to call patient back today on multiple occasions, explaining that we can change Rx to Norco 10mg temporarily until I can get authorization on his Roxicodone by [facility] insurance. I have completed and faxed all paperwork to [facility] for authorization. Will wait to hear back from patient in regards to changing his Rx temporarily.     Patient calls back, he wants Birney Rx until insurance approves Roxicodone.

## 2018-04-05 ENCOUNTER — TELEPHONE (OUTPATIENT)
Dept: NEUROSURGERY | Facility: CLINIC | Age: 58
End: 2018-04-05

## 2018-04-05 DIAGNOSIS — Z98.1 S/P LUMBAR FUSION: ICD-10-CM

## 2018-04-05 RX ORDER — HYDROCODONE BITARTRATE AND ACETAMINOPHEN 10; 325 MG/1; MG/1
1 TABLET ORAL EVERY 4 HOURS PRN
Qty: 60 TABLET | Refills: 0 | Status: SHIPPED | OUTPATIENT
Start: 2018-04-05 | End: 2018-04-16 | Stop reason: SDUPTHER

## 2018-04-05 NOTE — TELEPHONE ENCOUNTER
[facility] that [facility] are not [facility] the Oxycodone Rx. I have spoke with Dr. Almaraz regarding this and we are going to continue Norco 10mg Q4H. I have contacted patient regarding this but had to leave a message. Will go ahead and fill Rx for Norco so this will be available for patient to .

## 2018-04-06 NOTE — PAYOR COMM NOTE
"FROM: ALEKSANDRA MUHAMMAD  PHONE: [phone]  FAX: [phone]    AUTH: [id number]    Yeimy Han (57 y.o. Male)     Date of Birth Social Security Number Address Home Phone MRN    1960  [address] [facility] [address] [phone] [id number]    Latter day Marital Status          None        Admission Date Admission Type Admitting Provider Attending Provider Department, Room/Bed    3/30/18 Elective Mehran Almaraz MD  [facility] 3A, 351/1    Discharge Date Discharge Disposition Discharge Destination        4/3/2018 Home or Self Care              Attending Provider:  (none)   Allergies:  Naproxen, Nsaids, Prednisone    Isolation:  None   Infection:  None   Code Status:  Prior    Ht:  176.5 cm (69.49\")   Wt:  88.5 kg (195 lb 1.7 oz)    Admission Cmt:  None   Principal Problem:  None                Active Insurance as of 3/30/2018     Primary Coverage     Payor Plan Insurance Group Employer/Plan Group    [employer] [place] [employer] [place]      Payor Plan Address Payor Plan Phone Number Effective From Effective To    [address] [address] [phone] 1/25/2018     [place] [address]       Subscriber Name Subscriber Birth Date Member ID       YEIMY HAN 1960 [id number]                 Emergency Contacts      (Rel.) Home Phone Work Phone Mobile Phone    Solo Han (Son) -- -- [phone]                  ICU Vital Signs     Row Name 04/03/18 1542 04/03/18 1130 04/03/18 0741 04/03/18 0722 04/03/18 0310       Vitals    Temp 98.3 °F (36.8 °C) 98.3 °F (36.8 °C)  -- 98.4 °F (36.9 °C) 98.6 °F (37 °C)    Temp src Oral Oral  -- Oral Oral    Pulse 65 72  -- 84 87    Heart Rate Source Monitor Monitor  -- Monitor Monitor    Resp 18 18  -- 20 19    Resp Rate Source Visual Visual  -- Visual Visual    /80 106/55  -- 127/79 104/56    BP Location Left arm Right arm  -- Left arm Right arm    BP Method Automatic Automatic  -- Automatic Automatic    Patient Position Lying " Lying  -- Sitting Lying       Oxygen Therapy    SpO2 95 % 93 %  -- 95 % 97 %    Pulse Oximetry Type Intermittent  --  --  -- Intermittent    Device (Oxygen Therapy) room air room air room air room air room air    Row Name 04/03/18 0005 04/02/18 2024 04/02/18 1952 04/02/18 1556 04/02/18 1120       Vitals    Temp 97.6 °F (36.4 °C) 98.9 °F (37.2 °C)  -- 98.2 °F (36.8 °C) 98.4 °F (36.9 °C)    Temp src Oral Oral  -- Oral Oral    Pulse 72 79  -- 75 74    Heart Rate Source Monitor Monitor  -- Monitor Monitor    Resp 20 16  -- 16 20    Resp Rate Source Visual Visual  -- Visual Visual    BP 96/60 131/76  -- 112/59 116/82    BP Location Right arm Left arm  -- Left arm Left arm    BP Method Automatic Automatic  -- Automatic Automatic    Patient Position Lying Lying  -- Lying Lying       Oxygen Therapy    SpO2 97 % 93 %  -- 95 % 95 %    Pulse Oximetry Type Intermittent Intermittent  -- Intermittent  --    Device (Oxygen Therapy) room air room air nasal cannula with reservoir (i.e. oximizer) room air room air    Flow (L/min)  --  -- 1.5  --  --    Row Name 04/02/18 0820 04/02/18 0743 04/02/18 0427 04/01/18 2327 04/01/18 2011       Vitals    Temp 98.2 °F (36.8 °C)  -- 97.9 °F (36.6 °C) 99.6 °F (37.6 °C) 99.3 °F (37.4 °C)    Temp src Oral  -- Oral Oral Oral    Pulse 93  -- 81 54 86    Heart Rate Source Monitor  -- Monitor Monitor Monitor    Resp 20  -- 16 18 16    Resp Rate Source Visual  -- Visual  -- Visual    /66  -- 129/83 153/69 101/52    BP Location Left arm  -- Left arm Right arm Right arm    BP Method Automatic  -- Automatic Automatic Automatic    Patient Position Sitting  -- Lying Lying Lying       Oxygen Therapy    SpO2 96 %  -- 95 % 98 % 95 %    Pulse Oximetry Type  --  -- Intermittent Intermittent Intermittent    Device (Oxygen Therapy) room air room air nasal cannula room air room air    Row Name 04/01/18 1940 04/01/18 1500 04/01/18 1240 04/01/18 0810 04/01/18 0804       Vitals    Temp  -- 98.2 °F (36.8 °C) 99.2  °F (37.3 °C) 98.5 °F (36.9 °C)  --    Temp src  -- Oral Oral Oral  --    Pulse  -- 80 86 87  --    Heart Rate Source  -- Monitor Monitor Monitor  --    Resp  -- 18 18 18  --    Resp Rate Source  -- Visual Visual Visual  --    BP  -- 105/62 101/56 126/67  --    BP Location  -- Right arm Right arm Right arm  --    BP Method  -- Automatic Automatic Automatic  --    Patient Position  -- Lying Lying Lying  --       Oxygen Therapy    SpO2  -- 94 % 95 % 95 %  --    Pulse Oximetry Type  --  --  -- Intermittent  --    Device (Oxygen Therapy) room air room air room air room air room air    Row Name 04/01/18 0341 03/31/18 2353 03/31/18 1941 03/31/18 1938 03/31/18 1615       Vitals    Temp 99 °F (37.2 °C) 99.5 °F (37.5 °C)  -- 100 °F (37.8 °C)   notified nurse 98.2 °F (36.8 °C)    Temp src Oral Oral  -- Oral Oral    Pulse 98 89  -- 88  --    Heart Rate Source Monitor Monitor  -- Monitor  --    Resp 18 18  -- 18  --    Resp Rate Source Visual Visual  -- Visual  --    /58 109/60  -- 105/64  --    BP Location Left arm Left arm  -- Left arm  --    BP Method Automatic Automatic  -- Automatic  --    Patient Position Lying Lying  -- Lying  --       Oxygen Therapy    SpO2 95 % 95 %  -- 95 %  --    Pulse Oximetry Type Intermittent Intermittent  -- Intermittent  --    Device (Oxygen Therapy) room air room air room air room air  --    Row Name 03/31/18 1532 03/31/18 1213 03/31/18 0821 03/31/18 0815 03/31/18 0423       Vitals    Temp 99.9 °F (37.7 °C)   notified nurse 97.4 °F (36.3 °C) 98.6 °F (37 °C)  -- 98.9 °F (37.2 °C)    Temp src Oral Oral Oral  -- Axillary    Pulse 83 85 88  -- 91    Heart Rate Source Monitor Monitor Monitor  -- Monitor    Resp 18 20 20  -- 20    Resp Rate Source Visual Visual Visual  -- Visual    /52 116/71 115/74  -- 118/54    BP Location Right arm Right arm Right arm  -- Right arm    BP Method Automatic Automatic Automatic  -- Automatic    Patient Position Lying Lying Lying  -- Lying       Oxygen  "Therapy    SpO2 93 % 95 % 97 %  -- 94 %    Pulse Oximetry Type Intermittent  --  --  -- Continuous    Device (Oxygen Therapy) room air nasal cannula nasal cannula room air nasal cannula    Flow (L/min)  -- 3 3  -- 3    Row Name 03/31/18 0015 03/30/18 2035 03/30/18 2000 03/30/18 1620 03/30/18 1550       Height and Weight    Height  --  --  --  -- 176.5 cm (69.49\")    Weight  --  --  --  -- 88.5 kg (195 lb 1.7 oz)    Ideal Body Weight (IBW) (kg)  --  --  --  -- 75.05    BSA (Calculated - sq m)  --  --  --  -- 2.05 sq meters    BMI (Calculated)  --  --  --  -- 28.4    Weight in (lb) to have BMI = 25  --  --  --  -- 171.3       Vitals    Temp 98.4 °F (36.9 °C) 98.1 °F (36.7 °C)  -- 98.2 °F (36.8 °C) 98.2 °F (36.8 °C)    Temp src Oral Oral  -- Oral Oral    Pulse 89 83  -- 90 91    Heart Rate Source Monitor Monitor  -- Monitor Monitor    Resp 18 16  -- 16 16    Resp Rate Source Visual Visual  -- Visual Visual    /60 105/54  -- 127/63 138/69    BP Location Left arm Left arm  -- Right arm Right arm    BP Method Automatic Automatic  -- Automatic Automatic    Patient Position Lying Lying  -- Lying Lying       Oxygen Therapy    SpO2 97 % 94 %  -- 98 % 98 %    Pulse Oximetry Type Continuous Continuous  -- Continuous Continuous    Device (Oxygen Therapy) nasal cannula nasal prongs nasal cannula nasal cannula nasal cannula    Flow (L/min) 3 3 3 3 3    Row Name 03/30/18 1520 03/30/18 1449 03/30/18 1434 03/30/18 1419 03/30/18 1414       Vitals    Temp 98 °F (36.7 °C) 97.7 °F (36.5 °C)  --  --  --    Temp src Oral Temporal Artery   --  --  --    Pulse 88 93 99 91  --    Heart Rate Source Monitor Monitor  --  --  --    Resp 16 16 18 21  --    Resp Rate Source Visual  --  --  --  --    /57 136/88 124/78 131/78  --    Noninvasive MAP (mmHg)  -- 98 90 90  --    BP Location Right arm Right arm  --  --  --    BP Method Automatic Automatic  --  --  --    Patient Position Lying Lying  --  --  --       Oxygen Therapy    SpO2 98 % " 95 % 95 % 96 %  --    Pulse Oximetry Type Continuous  --  --  --  --    Device (Oxygen Therapy) nasal cannula nasal cannula nasal cannula nasal cannula nasal cannula    Flow (L/min) 3 3 3 3  --    Row Name 03/30/18 1404 03/30/18 1349 03/30/18 1344 03/30/18 1339 03/30/18 1334       Vitals    Pulse 95 96 99 106 108    Heart Rate Source  --  -- Monitor Monitor Monitor    Resp 19 21 14 14 20    Resp Rate Source Monitor Monitor Monitor Monitor Monitor    /86 119/65 (!)  101/36 113/55 120/70    Noninvasive MAP (mmHg) 95 75 76 68  --    BP Method  --  -- Automatic Automatic Automatic    Patient Position  --  -- Lying Lying Lying       Oxygen Therapy    SpO2 94 % 93 % 99 % 97 % 97 %    Pulse Oximetry Type  --  -- Continuous Continuous Continuous    Device (Oxygen Therapy) nasal cannula nasal cannula simple face mask simple face mask simple face mask    Flow (L/min) 3 3 8 8 8       Art Line    Arterial Line MAP (mmHg)  --  -- 277 mmHg --  --    Row Name 03/30/18 1329                   Vitals    Temp 98.2 °F (36.8 °C)        Temp src Temporal Artery         Pulse 114        Heart Rate Source Monitor        Resp 16        Resp Rate Source Monitor        /60        BP Method Automatic        Patient Position Lying           Oxygen Therapy    SpO2 94 %        Pulse Oximetry Type Continuous        Device (Oxygen Therapy) simple face mask        Flow (L/min) 8            Hospital Medications (all)       Dose Frequency Start End    buffered lidocaine syringe 0.5 mL 0.5 mL Once As Needed 3/30/2018 3/30/2018    Sig - Route: Inject 0.5 mL into the skin 1 (One) Time As Needed (Prior to IV Insertion). - Intradermal    ceFAZolin (ANCEF) 2 g/20ml IV PUSH syringe 2 g Once 3/30/2018 3/30/2018    Sig - Route: Infuse 20 mL into a venous catheter 1 (One) Time. - Intravenous    ceFAZolin (ANCEF) 2 g/20ml IV PUSH syringe 2 g Every 8 Hours 3/30/2018 3/31/2018    Sig - Route: Infuse 20 mL into a venous catheter Every 8 (Eight) Hours. -  Intravenous    magnesium citrate solution 150 mL 150 mL Once 4/2/2018 4/2/2018    Sig - Route: Take 150 mL by mouth 1 (One) Time. - Oral    acetaminophen (TYLENOL) tablet 650 mg (Discontinued) 650 mg Every 6 Hours PRN 3/30/2018 3/30/2018    Sig - Route: Take 2 tablets by mouth Every 6 (Six) Hours As Needed for Mild Pain . - Oral    Reason for Discontinue: Duplicate order    acetaminophen (TYLENOL) tablet 650 mg (Discontinued) 650 mg Every 4 Hours PRN 3/30/2018 4/1/2018    Sig - Route: Take 2 tablets by mouth Every 4 (Four) Hours As Needed for Mild Pain . - Oral    acetaminophen (TYLENOL) tablet 650 mg (Discontinued) 650 mg Every 4 Hours Scheduled 4/1/2018 4/3/2018    Sig - Route: Take 2 tablets by mouth Every 4 (Four) Hours. - Oral    Reason for Discontinue: Patient Discharge    atropine sulfate injection 0.5 mg (Discontinued) 0.5 mg Once As Needed 3/30/2018 3/30/2018    Sig - Route: Infuse 5 mL into a venous catheter 1 (One) Time As Needed for Bradycardia (symptomatic bradycardia (hypotension, dizziness, confusion). Notify attending anesthesiologist.). - Intravenous    Reason for Discontinue: Patient Transfer    buffered lidocaine syringe 0.5 mL (Discontinued) 0.5 mL Once As Needed 3/30/2018 3/30/2018    Sig - Route: Inject 0.5 mL as directed 1 (One) Time As Needed (IV Start). - Injection    Reason for Discontinue: Patient Transfer    buffered lidocaine syringe 0.5 mL (Discontinued) 0.5 mL Once As Needed 3/30/2018 3/30/2018    Sig - Route: Inject 0.5 mL as directed 1 (One) Time As Needed (IV Start). - Injection    Reason for Discontinue: Patient Transfer    bupivacaine-EPINEPHrine PF (MARCAINE w/EPI) 0.25% -[id number] injection (Discontinued)  As Needed 3/30/2018 3/30/2018    Sig: As Needed.    Reason for Discontinue: Patient Discharge    diazePAM (VALIUM) injection 5 mg (Discontinued) 5 mg Every 4 Hours PRN 3/30/2018 3/30/2018    Sig - Route: Infuse 1 mL into a venous catheter Every 4 (Four) Hours As Needed for  Muscle Spasms. - Intravenous    Reason for Discontinue: Reorder    diazePAM (VALIUM) tablet 5 mg (Discontinued) 5 mg Every 4 Hours PRN 3/30/2018 4/1/2018    Sig - Route: Take 1 tablet by mouth Every 4 (Four) Hours As Needed for Muscle Spasms. - Oral    diazePAM (VALIUM) tablet 5 mg (Discontinued) 5 mg Every 8 Hours Scheduled 4/1/2018 4/3/2018    Sig - Route: Take 1 tablet by mouth Every 8 (Eight) Hours. - Oral    Reason for Discontinue: Patient Discharge    flumazenil (ROMAZICON) injection 0.2 mg (Discontinued) 0.2 mg As Needed 3/30/2018 3/30/2018    Sig - Route: Infuse 2 mL into a venous catheter As Needed (unresponsiveness). - Intravenous    Reason for Discontinue: Patient Transfer    gabapentin (NEURONTIN) capsule 100 mg (Discontinued) 100 mg Every 8 Hours Scheduled 4/1/2018 4/3/2018    Sig - Route: Take 1 capsule by mouth Every 8 (Eight) Hours. - Oral    Reason for Discontinue: Patient Discharge    gelatin absorbable (GELFOAM) powder (Discontinued)  As Needed 3/30/2018 3/30/2018    Sig: As Needed.    Reason for Discontinue: Patient Discharge    heparin (porcine) 5000 UNIT/ML injection 5,000 Units (Discontinued) 5,000 Units Every 12 Hours Scheduled 3/31/2018 4/3/2018    Sig - Route: Inject 1 mL under the skin Every 12 (Twelve) Hours. - Subcutaneous    Reason for Discontinue: Patient Discharge    hydrALAZINE (APRESOLINE) injection 5 mg (Discontinued) 5 mg Every 10 Minutes PRN 3/30/2018 3/30/2018    Sig - Route: Infuse 0.25 mL into a venous catheter Every 10 (Ten) Minutes As Needed for High Blood Pressure (for systolic blood pressure greater than 180 mmHg or diastolic blood pressure greater than 105 mmHg when HR less than 60). - Intravenous    Reason for Discontinue: Patient Transfer    HYDROcodone-acetaminophen (NORCO) 7.5-325 MG per tablet 1 tablet (Discontinued) 1 tablet Every 4 Hours PRN 3/30/2018 3/31/2018    Sig - Route: Take 1 tablet by mouth Every 4 (Four) Hours As Needed for Moderate Pain . - Oral     "HYDROmorphone (DILAUDID) injection 0.5 mg (Discontinued) 0.5 mg As Needed 3/30/2018 3/30/2018    Sig - Route: Infuse 0.5 mL into a venous catheter As Needed for Moderate Pain  or Severe Pain . - Intravenous    Reason for Discontinue: Patient Transfer    HYDROmorphone (DILAUDID) injection 0.5 mg (Discontinued) 0.5 mg Every 2 Hours PRN 3/30/2018 4/3/2018    Sig - Route: Infuse 0.25 mL into a venous catheter Every 2 (Two) Hours As Needed for Severe Pain . - Intravenous    Reason for Discontinue: Patient Discharge    Linked Group 1:  \"And\" Linked Group Details        HYDROmorphone (DILAUDID) PCA 1 mg/mL syringe (Discontinued)  Continuous 3/30/2018 3/31/2018    Sig - Route: Infuse  into a venous catheter Continuous. - Intravenous    hydrOXYzine (ATARAX) tablet 25 mg (Discontinued) 25 mg 3 Times Daily PRN 3/30/2018 4/3/2018    Sig - Route: Take 1 tablet by mouth 3 (Three) Times a Day As Needed for Itching. - Oral    Reason for Discontinue: Patient Discharge    ipratropium-albuterol (DUO-NEB) nebulizer solution 3 mL (Discontinued) 3 mL Once As Needed 3/30/2018 3/30/2018    Sig - Route: Take 3 mL by nebulization 1 (One) Time As Needed for Wheezing or Shortness of Air (bronchospasm). - Nebulization    Reason for Discontinue: Patient Transfer    labetalol (NORMODYNE,TRANDATE) injection 5 mg (Discontinued) 5 mg Every 5 Minutes PRN 3/30/2018 3/30/2018    Sig - Route: Infuse 1 mL into a venous catheter Every 5 (Five) Minutes As Needed for High Blood Pressure (for systolic blood pressure greater than 180 mmHg or diastolic blood pressure greater than 105 mmHg). - Intravenous    Reason for Discontinue: Patient Transfer    lactated ringers infusion 1,000 mL (Discontinued) 1,000 mL Continuous 3/30/2018 3/30/2018    Sig - Route: Infuse 1,000 mL into a venous catheter Continuous. - Intravenous    lactated ringers infusion (Discontinued) 30 mL/hr Continuous 3/30/2018 3/30/2018    Sig - Route: Infuse 30 mL/hr into a venous catheter " "Continuous. - Intravenous    lactated ringers infusion (Discontinued) 100 mL/hr Continuous 3/30/2018 3/30/2018    Sig - Route: Infuse 100 mL/hr into a venous catheter Continuous. - Intravenous    lactated ringers infusion (Discontinued) 100 mL/hr Continuous 3/30/2018 3/30/2018    Sig - Route: Infuse 100 mL/hr into a venous catheter Continuous. - Intravenous    levothyroxine (SYNTHROID, LEVOTHROID) tablet 100 mcg (Discontinued) 100 mcg Every Early Morning 3/30/2018 4/3/2018    Sig - Route: Take 1 tablet by mouth Every Morning. - Oral    Reason for Discontinue: Patient Discharge    meperidine (DEMEROL) injection 12.5 mg (Discontinued) 12.5 mg Every 5 Minutes PRN 3/30/2018 3/30/2018    Sig - Route: Infuse 0.5 mL into a venous catheter Every 5 (Five) Minutes As Needed for Shivering (May repeat x 3). - Intravenous    Reason for Discontinue: Patient Transfer    metoclopramide (REGLAN) injection 5 mg (Discontinued) 5 mg Every 15 Minutes PRN 3/30/2018 3/30/2018    Sig - Route: Infuse 1 mL into a venous catheter Every 15 (Fifteen) Minutes As Needed for Nausea or Vomiting (for nausea/vomiting). - Intravenous    Reason for Discontinue: Patient Transfer    midazolam (VERSED) injection 1 mg (Discontinued) 1 mg Every 5 Minutes PRN 3/30/2018 3/30/2018    Sig - Route: Infuse 1 mL into a venous catheter Every 5 (Five) Minutes As Needed for Anxiety (Max 4mg midazolam TOTAL). - Intravenous    Reason for Discontinue: Patient Transfer    Linked Group 2:  \"Or\" Linked Group Details        midazolam (VERSED) injection 1 mg (Discontinued) 1 mg Every 5 Minutes PRN 3/30/2018 3/30/2018    Sig - Route: Infuse 1 mL into a venous catheter Every 5 (Five) Minutes As Needed for Anxiety (Max 4mg midazolam TOTAL). - Intravenous    Reason for Discontinue: Patient Transfer    Linked Group 3:  \"Or\" Linked Group Details        midazolam (VERSED) injection 2 mg (Discontinued) 2 mg Every 5 Minutes PRN 3/30/2018 3/30/2018    Sig - Route: Infuse 2 mL into a " "venous catheter Every 5 (Five) Minutes As Needed for Anxiety (Max 4mg midazolam TOTAL). - Intravenous    Reason for Discontinue: Patient Transfer    Linked Group 2:  \"Or\" Linked Group Details        midazolam (VERSED) injection 2 mg (Discontinued) 2 mg Every 5 Minutes PRN 3/30/2018 3/30/2018    Sig - Route: Infuse 2 mL into a venous catheter Every 5 (Five) Minutes As Needed for Anxiety (Max 4mg midazolam TOTAL). - Intravenous    Reason for Discontinue: Patient Transfer    Linked Group 3:  \"Or\" Linked Group Details        Morphine sulfate (PF) injection 2 mg (Discontinued) 2 mg As Needed 3/30/2018 3/30/2018    Sig - Route: Infuse 1 mL into a venous catheter As Needed for Mild Pain . - Intravenous    Reason for Discontinue: Patient Transfer    naloxone (NARCAN) injection 0.04 mg (Discontinued) 0.04 mg As Needed 3/30/2018 3/30/2018    Sig - Route: Infuse 0.1 mL into a venous catheter As Needed for Opioid Reversal (unresponsiveness, decrease oxygen saturation). - Intravenous    Reason for Discontinue: Patient Transfer    naloxone (NARCAN) injection 0.1 mg (Discontinued) 0.1 mg Every 5 Minutes PRN 3/30/2018 3/31/2018    Sig - Route: Infuse 0.25 mL into a venous catheter Every 5 (Five) Minutes As Needed for Opioid Reversal or Respiratory Depression (see administration instructions). - Intravenous    naloxone (NARCAN) injection 0.4 mg (Discontinued) 0.4 mg Every 5 Minutes PRN 3/30/2018 4/3/2018    Sig - Route: Infuse 1 mL into a venous catheter Every 5 (Five) Minutes As Needed for Respiratory Depression. - Intravenous    Reason for Discontinue: Patient Discharge    Linked Group 1:  \"And\" Linked Group Details        ondansetron (ZOFRAN) injection 4 mg (Discontinued) 4 mg As Needed 3/30/2018 3/30/2018    Sig - Route: Infuse 2 mL into a venous catheter As Needed for Nausea or Vomiting. - Intravenous    Reason for Discontinue: Patient Transfer    ondansetron (ZOFRAN) injection 4 mg (Discontinued) 4 mg Every 6 Hours PRN " "3/30/2018 4/3/2018    Sig - Route: Infuse 2 mL into a venous catheter Every 6 (Six) Hours As Needed for Nausea or Vomiting. - Intravenous    Reason for Discontinue: Patient Discharge    Linked Group 4:  \"Or\" Linked Group Details        ondansetron (ZOFRAN) tablet 4 mg (Discontinued) 4 mg Every 6 Hours PRN 3/30/2018 4/3/2018    Sig - Route: Take 1 tablet by mouth Every 6 (Six) Hours As Needed for Nausea or Vomiting. - Oral    Reason for Discontinue: Patient Discharge    Linked Group 4:  \"Or\" Linked Group Details        ondansetron ODT (ZOFRAN-ODT) disintegrating tablet 4 mg (Discontinued) 4 mg Every 6 Hours PRN 3/30/2018 4/3/2018    Sig - Route: Take 1 tablet by mouth Every 6 (Six) Hours As Needed for Nausea or Vomiting. - Oral    Reason for Discontinue: Patient Discharge    Linked Group 4:  \"Or\" Linked Group Details        oxyCODONE (ROXICODONE) immediate release tablet 15 mg (Discontinued) 15 mg Every 4 Hours PRN 4/1/2018 4/3/2018    Sig - Route: Take 3 tablets by mouth Every 4 (Four) Hours As Needed for Moderate Pain . - Oral    Reason for Discontinue: Patient Discharge    oxyCODONE-acetaminophen (PERCOCET)  MG per tablet 1 tablet (Discontinued) 1 tablet Every 4 Hours PRN 3/31/2018 4/1/2018    Sig - Route: Take 1 tablet by mouth Every 4 (Four) Hours As Needed for Severe Pain . - Oral    oxyCODONE-acetaminophen (PERCOCET) 5-325 MG per tablet 1 tablet (Discontinued) 1 tablet Every 4 Hours PRN 3/31/2018 4/1/2018    Sig - Route: Take 1 tablet by mouth Every 4 (Four) Hours As Needed for Moderate Pain . - Oral    sennosides-docusate sodium (SENOKOT-S) 8.6-50 MG tablet 2 tablet (Discontinued) 2 tablet 2 Times Daily 3/30/2018 4/3/2018    Sig - Route: Take 2 tablets by mouth 2 (Two) Times a Day. - Oral    Reason for Discontinue: Patient Discharge    sertraline (ZOLOFT) tablet 150 mg (Discontinued) 150 mg Daily 3/30/2018 4/3/2018    Sig - Route: Take 150 mg by mouth Daily. - Oral    Reason for Discontinue: Patient " Discharge    sodium chloride (NS) irrigation solution (Discontinued)  As Needed 3/30/2018 3/30/2018    Sig: As Needed.    Reason for Discontinue: Patient Discharge    sodium chloride 0.9 % flush 1-10 mL (Discontinued) 1-10 mL As Needed 3/30/2018 3/30/2018    Sig - Route: Infuse 1-10 mL into a venous catheter As Needed for Line Care. - Intravenous    Reason for Discontinue: Patient Transfer    sodium chloride 0.9 % flush 1-10 mL (Discontinued) 1-10 mL As Needed 3/30/2018 3/30/2018    Sig - Route: Infuse 1-10 mL into a venous catheter As Needed for Line Care. - Intravenous    Reason for Discontinue: Patient Transfer    sodium chloride 0.9 % flush 1-10 mL (Discontinued) 1-10 mL As Needed 3/30/2018 3/30/2018    Sig - Route: Infuse 1-10 mL into a venous catheter As Needed for Line Care. - Intravenous    Reason for Discontinue: Patient Transfer    sodium chloride 0.9 % flush 1-10 mL (Discontinued) 1-10 mL As Needed 3/30/2018 4/3/2018    Sig - Route: Infuse 1-10 mL into a venous catheter As Needed for Line Care. - Intravenous    Reason for Discontinue: Patient Discharge    sodium chloride 0.9 % flush 3 mL (Discontinued) 3 mL As Needed 3/30/2018 3/30/2018    Sig - Route: Infuse 3 mL into a venous catheter As Needed for Line Care. - Intravenous    Reason for Discontinue: Patient Transfer    sodium chloride 0.9 % solution (Discontinued)  As Needed 3/30/2018 3/30/2018    Sig: As Needed.    Reason for Discontinue: Patient Discharge    sodium chloride 0.9 % with KCl 20 mEq/L infusion (Discontinued) 100 mL/hr Continuous 3/30/2018 3/31/2018    Sig - Route: Infuse 100 mL/hr into a venous catheter Continuous. - Intravenous    sodium chloride 1,000 mL with bacitracin 50,000 Units irrigation (Discontinued)  As Needed 3/30/2018 3/30/2018    Sig: As Needed.    Reason for Discontinue: Patient Discharge    thrombin spray (Discontinued)  As Needed 3/30/2018 3/30/2018    Sig: As Needed.    Reason for Discontinue: Patient Discharge           Lab Results (last 7 days)     ** No results found for the last 168 hours. **        Imaging Results (last 7 days)     Procedure Component Value Units Date/Time    XR Abdomen KUB [[id number]] Collected:  04/02/18 1336     Updated:  04/02/18 1340    Narrative:       History:  57-year-old with post abdomen surgery.     Reference:  KUB March 30, 2018.     FINDINGS:  Frontal abdominal radiograph performed.     Nonobstructive bowel gas pattern. Moderate volume stool throughout the  colon. Abdominal wall skin staples noted. Postoperative changes at the  lumbosacral junction. No obvious free air or urinary tract stone.       Impression:       No evidence of obstruction or ileus. Constipation.  This report was finalized on 04/02/2018 13:37 by  Dennis Jimenes .    XR Spine Lumbar AP & Lateral [[id number]] Collected:  03/30/18 1424     Updated:  04/01/18 1410    Narrative:       HISTORY: Lumbar fusion     Lumbar spine: 4 spot views of the lumbar spine are obtained  intraoperatively.     Fluoroscopy time 14 seconds     There is anterior cervical fusion of L5 and S1 with an interposition  graft placed between the fused vertebra. Alignment appears appropriate.  This report was finalized on 03/30/2018 14:25 by Dr. Cristel Joseph MD.    FL C Arm During Surgery [[id number]] Collected:  03/30/18 1524     Updated:  04/01/18 1410    Narrative:       HISTORY: Lumbar fusion     Lumbar spine: 4 spot views of the lumbar spine are obtained  intraoperatively.     Fluoroscopy time 14 seconds     There is anterior cervical fusion of L5 and S1 with an interposition  graft placed between the fused vertebra. Alignment appears appropriate.  This report was finalized on 03/30/2018 14:25 by Dr. Cristel Joseph MD.    XR Spine Lumbar 2 or 3 View [[id number]] Collected:  03/30/18 2018     Updated:  03/30/18 2023    Narrative:       EXAMINATION: XR SPINE LUMBAR 2 OR 3 VW- 3/30/2018 8:18 PM CDT     HISTORY: Postoperative exam following lumbosacral  fusion     COMPARISON: 9/8/2017     FINDINGS:  There are 5 non rib-bearing vertebral bodies. There has been anterior  and posterior fusion at L5-S1. An interbody disc spacer is in place.  Alignment of the lumbar spine appears normal. Vertebral body heights  appear well maintained. Skin staples remain in place.       Impression:       Postsurgical changes of the lumbosacral spine as above  without appreciable acute bony abnormality.  This report was finalized on 03/30/2018 20:19 by Dr. Ky Andujar MD.    XR Abdomen KUB [[id number]] Collected:  03/30/18 0952     Updated:  03/30/18 1413    Narrative:       EXAMINATION: XR ABDOMEN KUB- 3/30/2018 9:52 AM CDT     HISTORY: Instrument count; M51.16-Intervertebral disc disorders with  radiculopathy, lumbar region.     REPORT: A supine view the abdomen was obtained in the operating room for  instrument count. Correlation is made with an AP view the pelvis from  3/20/2018. There has been interval lumbosacral fusion surgery, a  surgical instruments are seen in the field-of-view. The bowel gas  pattern appears normal. The image is stored in PACS for review.  This report was finalized on 03/30/2018 10:09 by Dr. Solo Tolbert MD.        ECG/EMG Results (last 7 days)     ** No results found for the last 168 hours. **        Orders (last 7 days)     Start     Ordered    04/03/18 1359  Discharge patient  Once      04/03/18 1359 04/03/18 0000  acetaminophen (TYLENOL) 325 MG tablet  Every 6 Hours      04/03/18 1359    04/03/18 0000  oxyCODONE (ROXICODONE) 15 MG immediate release tablet  Every 4 Hours PRN      04/03/18 1359    04/03/18 0000  diazePAM (VALIUM) 5 MG tablet  Every 8 Hours PRN      04/03/18 1359    04/03/18 0000  gabapentin (NEURONTIN) 100 MG capsule  Every 8 Hours Scheduled      04/03/18 1359    04/03/18 0000  sennosides-docusate sodium (SENOKOT-S) 8.6-50 MG tablet  2 Times Daily      04/03/18 1359    04/03/18 0000  Miscellaneous DME      04/03/18 1359    04/03/18  0000  Ambulatory Referral to Physical Therapy      18 1359    18 0000  Ambulatory Referral to Occupational Therapy      18 1359    18 1200  magnesium citrate solution 150 mL  Once      18 1126    18 0830  XR Abdomen KUB  1 Time Imaging      18 0829    18 0829  Fleet Enema  Once,   Status:  Canceled     Comments:  x2 for bowel movement.    18 0829    18 1400  gabapentin (NEURONTIN) capsule 100 mg  Every 8 Hours Scheduled,   Status:  Discontinued      18 1137    18 1400  diazePAM (VALIUM) tablet 5 mg  Every 8 Hours Scheduled,   Status:  Discontinued      18 1137    18 1200  acetaminophen (TYLENOL) tablet 650 mg  Every 4 Hours Scheduled,   Status:  Discontinued      18 1137    18 1135  oxyCODONE (ROXICODONE) immediate release tablet 15 mg  Every 4 Hours PRN,   Status:  Discontinued      187    18 203  oxyCODONE-acetaminophen (PERCOCET)  MG per tablet 1 tablet  Every 4 Hours PRN,   Status:  Discontinued      18 0923  oxyCODONE-acetaminophen (PERCOCET) 5-325 MG per tablet 1 tablet  Every 4 Hours PRN,   Status:  Discontinued      18 0923    18 0923  OT Plan of Care Cert / Re-Cert  Once     Comments:  Occupational Therapy Plan of Care  Initial Certification  Certification Period: 3/31/2018 - 2018    Patient Name: Eric Caldera  : 1960    (M51.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  (Z78.9) Decreased activities of daily living (ADL)                Assessment  OT Assessment  OT Diagnosis: decreased ADL  Rehab Potential (OT Eval): good, to achieve stated therapy goals  Criteria for Skilled Therapeutic Interventions Met (OT Eval): yes, treatment indicated              OT Rehab Goals     Row Name 18 0800             Transfer Goal 1 (OT)    Activity/Assistive Device (Transfer Goal 1, OT) tub  -AC      [place] Level/Cues Needed (Transfer Goal 1,  OT) contact guard assist    -AC      Time Frame (Transfer Goal 1, OT) long term goal (LTG);10 days  -AC      Progress/Outcome (Transfer Goal 1, OT) goal ongoing  -AC         Bathing Goal 1 (OT)    Activity/Assistive Device (Bathing Goal 1, OT) lower body bathing  -AC      [place] Level/Cues Needed (Bathing Goal 1, OT) minimum assist (75%   or more patient effort)  -AC      Time Frame (Bathing Goal 1, OT) long term goal (LTG);10 days  -AC      Progress/Outcomes (Bathing Goal 1, OT) goal ongoing  -AC         Dressing Goal 1 (OT)    Activity/Assistive Device (Dressing Goal 1, OT) lower body dressing  -AC        [place]/Cues Needed (Dressing Goal 1, OT) minimum assist (75% or   more patient effort)  -AC      Time Frame (Dressing Goal 1, OT) long term goal (LTG);10 days  -AC      Progress/Outcome (Dressing Goal 1, OT) goal ongoing  -AC         Patient Education Goal (OT)    Activity (Patient Education Goal, OT) spinal precautions, LSO wear/care    -AC      [place]/Cues/Accuracy (Memory Goal 2, OT) demonstrates   adequately;independent;verbalizes understanding  -AC      Time Frame (Patient Education Goal, OT) long term goal (LTG);10 days  -AC        Progress/Outcome (Patient Education Goal, OT) goal ongoing  -AC        User Key  (r) = Recorded By, (t) = Taken By, (c) = Cosigned By    Initials Name Provider Type    AC Ariel Villarreal, OTR/L Occupational Therapist              OT Goals     Row Name 03/31/18 0917          Time Calculation    OT Goal Re-Cert Due Date 04/10/18  -AC       User Key  (r) = Recorded By, (t) = Taken By, (c) = Cosigned By    Initials Name Provider Type    AC Ariel Villarreal, OTR/L Occupational Therapist          Plan    OT Plan  Therapy Frequency (OT Eval): daily  Predicted Duration of Therapy Intervention (OT Eval): 10 days  Outcome Summary: OT eval completed.  Pain was pt's biggest limiting factor during eval.  He requires maxA for donning socks and LSO.  He is unable to reach his  feet for ADL.  Ean for log roll, CGA for transfers, hand held assist to walk to door and to chair.  Pt was instructed on spinal precautions, log roll, brace wear/care.  He will need reinforcement of the above teaching.  Skilled OT required to address decreased ADL function, balance and safety.  Anticipate discharge home with sonCasie Villarreal, OTR/L  3/31/2018        By cosigning this order, either electronically or physically, I certify that the therapy services are furnished while this patient is under my care, the services outline above are required by this patient, and will be reviewed every 90 days.        M.D.:__________________________________________ Date: ______________    18 0922    18 0915  PT Plan of Care Cert / Re-Cert  Once     Comments:  Physical Therapy Plan of Care  Initial Certification  Certification Period: 3/31/2018 - 2018    Patient Name: Eric Caldera  : 1960    (M51.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                  Assessment  PT Assessment  Functional Level at Time of Evaluation (PT Clinical Impression): CGA to min asst  Impairments Found (describe specific impairments): gait, locomotion, and balance  PT Diagnosis (PT Clinical Impression): abnormal gait/mobility  Criteria for Skilled Interventions Met (PT Clinical Impression): yes              PT Rehab Goals     Row Name 18 0830             Bed Mobility Goal 1 (PT)    Activity/Assistive Device (Bed Mobility Goal 1, PT) bed mobility   activities, all  -TB      [place] Level/Cues Needed (Bed Mobility Goal 1, PT) independent  -TB        Time Frame (Bed Mobility Goal 1, PT) by discharge  -TB      Barriers (Bed Mobility Goal 1, PT) pain  -TB      Progress/Outcomes (Bed Mobility Goal 1, PT) good progress toward goal    -TB         Transfer Goal 1 (PT)    Activity/Assistive Device (Transfer Goal 1, PT) transfers, all  -TB      [place] Level/Cues Needed (Transfer Goal 1, PT)  supervision required    -TB      Time Frame (Transfer Goal 1, PT) by discharge  -TB      Barriers (Transfers Goal 1, PT) pain  -TB      Progress/Outcome (Transfer Goal 1, PT) good progress toward goal  -TB         Gait Training Goal 1 (PT)    Activity/Assistive Device (Gait Training Goal 1, PT) gait (walking   locomotion)  -TB      [place] Level (Gait Training Goal 1, PT) supervision required  -TB        Distance (Gait Goal 1, PT) 200  -TB      Time Frame (Gait Training Goal 1, PT) by discharge  -TB      Barriers (Gait Training Goal 1, PT) pain  -TB      Progress/Outcome (Gait Training Goal 1, PT) good progress toward goal    -TB         Patient Education Goal (PT)    Activity (Patient Education Goal, PT) Independent with don/doff/use of   LSO  -TB      [place]/Cues/Accuracy (Memory Goal 2, PT) demonstrates adequately    -TB      Time Frame (Patient Education Goal, PT) by discharge  -TB      Barriers (Patient Education Goal, PT) pain  -TB      Progress/Outcome (Patient Education Goal, PT) good progress toward goal    -TB        User Key  (r) = Recorded By, (t) = Taken By, (c) = Cosigned By    Initials Name Provider Type    TB Dilip Avila, PT Physical Therapist              Plan  PT Plan  Therapy Frequency (PT Clinical Impression): 2 times/day  Planned Therapy Interventions (PT Eval): bed mobility training, gait training, patient/family education, strengthening, transfer training  Outcome Summary: Eval completed. He walked 15 feet with hand hold asst. Pain at 8/10 but says for first time in years, he has no throbbing pain in his legs.         Dilip Avila, PT  3/31/2018            By cosigning this order, either electronically or physically, I certify that the therapy services are furnished while this patient is under my care, the services outline above are required by this patient, and will be reviewed every 90 days.        M.D.:__________________________________________ Date: ______________     03/31/18 0914    03/31/18 0900  heparin (porcine) 5000 UNIT/ML injection 5,000 Units  Every 12 Hours Scheduled,   Status:  Discontinued      03/30/18 1522    03/30/18 2100  sennosides-docusate sodium (SENOKOT-S) 8.6-50 MG tablet 2 tablet  2 Times Daily,   Status:  Discontinued      03/30/18 1522    03/30/18 2000  ceFAZolin (ANCEF) 2 g/20ml IV PUSH syringe  Every 8 Hours      03/30/18 1522    03/30/18 1809  Inpatient Advance Care Planning Consult  Once,   Status:  Canceled     Provider:  (Not yet assigned)    03/30/18 1809    03/30/18 1600  sertraline (ZOLOFT) tablet 150 mg  Daily,   Status:  Discontinued      03/30/18 1522    03/30/18 1600  Incentive Spirometry  Every 2 Hours While Awake,   Status:  Canceled      03/30/18 1522    03/30/18 1600  Vital Signs  Every 4 Hours,   Status:  Canceled      03/30/18 1522    03/30/18 1600  Neurovascular Checks  Every 4 Hours,   Status:  Canceled      03/30/18 1522    03/30/18 1600  Strict Intake and Output  Every 4 Hours,   Status:  Canceled      03/30/18 1522    03/30/18 1600  sodium chloride 0.9 % with KCl 20 mEq/L infusion  Continuous,   Status:  Discontinued      03/30/18 1522    03/30/18 1600  Clear & Record PCA Pump Settings  Every 4 Hours,   Status:  Canceled      03/30/18 1522    03/30/18 1600  HYDROmorphone (DILAUDID) PCA 1 mg/mL syringe  Continuous,   Status:  Discontinued      03/30/18 1522    03/30/18 1535  diazePAM (VALIUM) tablet 5 mg  Every 4 Hours PRN,   Status:  Discontinued      03/30/18 1535    03/30/18 1530  levothyroxine (SYNTHROID, LEVOTHROID) tablet 100 mcg  Every Early Morning,   Status:  Discontinued      03/30/18 1522    03/30/18 1527  FL O Arm During Surgery  1 Time Imaging      03/30/18 1526    03/30/18 1526  CT Limited Localized Follow Up Study  1 Time Imaging      03/30/18 1526    03/30/18 1523  Assess Need for Indwelling Urinary Catheter - Follow Removal Protocol  Continuous,   Status:  Canceled     Comments:  Indwelling Urinary Catheter Removal  Criteria  Discontinue Indwelling Urinary Catheter Unless One of the Following is Present  Urinary Retention or Obstruction  Chronic Davis Catheter Use  End of Life  Critical Illness with Strict I/O   Tract or Abdominal Surgery  Stage 3/4 Sacral / Perineal Wound  Required Activity Restriction: Trauma  Required Activity Restriction: Spine Surgery  If Patient is Being Followed by Urology Contact Them PRIOR to Removal  Do Not Remove Indwelling Urinary Catheter Order is Present with a CLINICAL REASON to Maintain the Catheter. Provider is Required to Include a Clinical Reason to Maintain a Urinary Catheter    Chronic Davis Catheter Use (Present on Admission)  Assess for Continued Need & Document Medical Necessity  If Infection is Suspected, Contact the Provider        03/30/18 1522    03/30/18 1523  Catheter Care  Every Shift,   Status:  Canceled      03/30/18 1522 03/30/18 1523  Oxygen Therapy- Nasal Cannula; Titrate for SPO2: equal to or greater than, 92%, per policy  Continuous,   Status:  Canceled      03/30/18 1522 03/30/18 1523  Continuous Pulse Oximetry  Continuous,   Status:  Canceled      03/30/18 1522 03/30/18 1523  Advance Diet as Tolerated  Until Discontinued,   Status:  Canceled      03/30/18 1522    03/30/18 1523  Insert Peripheral IV  Once      03/30/18 1522    03/30/18 1523  Saline Lock & Maintain IV Access  Continuous,   Status:  Canceled      03/30/18 1522 03/30/18 1523  Full Code  Continuous,   Status:  Canceled      03/30/18 1522    03/30/18 1523  VTE Risk Assessment - Moderate Risk  Once      03/30/18 1522    03/30/18 1523  Place Compression Stockings (TEDs)  Once,   Status:  Canceled      03/30/18 1522    03/30/18 1523  Remove & Replace Compression Stockings (TEDS) Daily  Daily,   Status:  Canceled      03/30/18 1522    03/30/18 1523  Place Sequential Compression Device  Once      03/30/18 1522    03/30/18 1523  Maintain Sequential Compression Device  Continuous,   Status:  Canceled       03/30/18 1522    03/30/18 1523  Ambulate Patient  Every Shift,   Status:  Canceled     Comments:  In LSO    03/30/18 1522    03/30/18 1523  Saline Lock IV With Adequate PO Intake  Continuous,   Status:  Canceled      03/30/18 1522    03/30/18 1523  Discontinue Indwelling Urinary Catheter  Once,   Status:  Canceled      03/30/18 1522    03/30/18 1523  Notify Provider if Bladder Distention Continues  Until Discontinued,   Status:  Canceled      03/30/18 1522    03/30/18 1523  Consult Pharmacist For Review of Medications That May Cause Urinary Retention - RN To Place Order for Consult it Needed  Continuous,   Status:  Canceled      03/30/18 1522    03/30/18 1523  Notify Provider - Access Site  Until Discontinued,   Status:  Canceled      03/30/18 1522    03/30/18 1523  Notify Provider of Changes in Neuro Status  Until Discontinued,   Status:  Canceled      03/30/18 1522    03/30/18 1523  Notify Provider (Standard Adult Parameters)  Until Discontinued,   Status:  Canceled      03/30/18 1522    03/30/18 1523  Turn Cough Deep Breathe  Once      03/30/18 1522    03/30/18 1523  Diet Regular  Diet Effective Now,   Status:  Canceled      03/30/18 1522    03/30/18 1523  PT Consult: Eval & Treat  Once,   Status:  Canceled      03/30/18 1522    03/30/18 1523  OT Consult: Eval & Treat ambulation  Once,   Status:  Canceled      03/30/18 1522    03/30/18 1523  Assess Respiratory Rate, Pain Score & Sedation Level Using Pasero Opioid-Sedation Scale (POSS)  Per Hospital Policy,   Status:  Canceled     Comments:  Assess every 2 hours x 24 hours, then every 4 hours and PRN while receiving PCA.    03/30/18 1522    03/30/18 1523  Notify Provider for Inadequate Pain Control, Excessive Sedation or Other Issues Regarding PCA Therapy  Until Discontinued,   Status:  Canceled      03/30/18 1522    03/30/18 1523  XR Spine Lumbar 2 or 3 View  1 Time Imaging      03/30/18 1522    03/30/18 1522  Straight Cath  As Needed,   Status:  Canceled       03/30/18 1522    03/30/18 1522  Straight Cath Every 4-6 Hours As Needed If Patient is Unable to Void After 4-6 Hours, Bladder Scan Volume is Greater Than 350-500mL & Patient Has Symptoms of Bladder Discomfort / Distention  As Needed,   Status:  Canceled      03/30/18 1522    03/30/18 1522  HYDROmorphone (DILAUDID) injection 0.5 mg  Every 2 Hours PRN,   Status:  Discontinued      03/30/18 1522    03/30/18 1522  naloxone (NARCAN) injection 0.4 mg  Every 5 Minutes PRN,   Status:  Discontinued      03/30/18 1522    03/30/18 1522  ondansetron (ZOFRAN) tablet 4 mg  Every 6 Hours PRN,   Status:  Discontinued      03/30/18 1522 03/30/18 1522  ondansetron ODT (ZOFRAN-ODT) disintegrating tablet 4 mg  Every 6 Hours PRN,   Status:  Discontinued      03/30/18 1522 03/30/18 1522  ondansetron (ZOFRAN) injection 4 mg  Every 6 Hours PRN,   Status:  Discontinued      03/30/18 1522    03/30/18 1522  acetaminophen (TYLENOL) tablet 650 mg  Every 6 Hours PRN,   Status:  Discontinued      03/30/18 1522    03/30/18 1522  hydrOXYzine (ATARAX) tablet 25 mg  3 Times Daily PRN,   Status:  Discontinued      03/30/18 1522    03/30/18 1522  sodium chloride 0.9 % flush 1-10 mL  As Needed,   Status:  Discontinued      03/30/18 1522    03/30/18 1522  Apply Ice to Affected Area / Incision As Needed For Pain or Swelling  As Needed,   Status:  Canceled      03/30/18 1522    03/30/18 1522  Bladder Scan if Patient Unable to Void 4-6 Hours After Catheter Removal  As Needed,   Status:  Canceled      03/30/18 1522    03/30/18 1522  If Bladder Scan Volume is Less Than 350-500mL & Patient is Without Symptoms of Bladder Discomfort / Distention Monitor Every 1-2 Hours for Spontaneous Void  As Needed,   Status:  Canceled      03/30/18 1522    03/30/18 1522  Schedule / Prompt Voiding For Patients With Urinary Incontinence  As Needed,   Status:  Canceled      03/30/18 1522    03/30/18 1522  acetaminophen (TYLENOL) tablet 650 mg  Every 4 Hours PRN,   Status:   Discontinued      03/30/18 1522    03/30/18 1522  HYDROcodone-acetaminophen (NORCO) 7.5-325 MG per tablet 1 tablet  Every 4 Hours PRN,   Status:  Discontinued      03/30/18 1522    03/30/18 1522  diazePAM (VALIUM) injection 5 mg  Every 4 Hours PRN,   Status:  Discontinued      03/30/18 1522    03/30/18 1522  naloxone (NARCAN) injection 0.1 mg  Every 5 Minutes PRN,   Status:  Discontinued      03/30/18 1522    03/30/18 1413  FL C Arm During Surgery  1 Time Imaging      03/30/18 1412    03/30/18 1412  XR Spine Lumbar AP & Lateral  1 Time Imaging      03/30/18 1412    03/30/18 1338  Vital signs every 5 minutes for 15 minutes, every 15 minutes thereafter.  Once,   Status:  Canceled      03/30/18 1337    03/30/18 1338  Call Anesthesiologist for additional IV Fluid bolus for Hypotension/Tachycardia  Continuous,   Status:  Canceled      03/30/18 1337    03/30/18 1338  Notify Anesthesia of Any Acute Changes in Patient Condition  Until Discontinued,   Status:  Canceled      03/30/18 1337    03/30/18 1338  Notify Anesthesia for Unrelieved Pain  Until Discontinued,   Status:  Canceled      03/30/18 1337    03/30/18 1338  Once DC criteria to floor met, follow surgeon's orders.  Until Discontinued,   Status:  Canceled      03/30/18 1337    03/30/18 1338  Discharge patient from PACU when discharge criteria is met.  Until Discontinued,   Status:  Canceled      03/30/18 1337    03/30/18 1337  Apply warming blanket  As Needed,   Status:  Canceled     Comments:  For a recorded temp of <36.9 C    03/30/18 1337    03/30/18 1337  Morphine sulfate (PF) injection 2 mg  As Needed,   Status:  Discontinued      03/30/18 1337    03/30/18 1337  HYDROmorphone (DILAUDID) injection 0.5 mg  As Needed,   Status:  Discontinued      03/30/18 1337    03/30/18 1337  labetalol (NORMODYNE,TRANDATE) injection 5 mg  Every 5 Minutes PRN,   Status:  Discontinued      03/30/18 1337    03/30/18 1337  hydrALAZINE (APRESOLINE) injection 5 mg  Every 10 Minutes  PRN,   Status:  Discontinued      03/30/18 1337 03/30/18 1337  atropine sulfate injection 0.5 mg  Once As Needed,   Status:  Discontinued      03/30/18 1337 03/30/18 1337  ipratropium-albuterol (DUO-NEB) nebulizer solution 3 mL  Once As Needed,   Status:  Discontinued      03/30/18 1337 03/30/18 1337  naloxone (NARCAN) injection 0.04 mg  As Needed,   Status:  Discontinued      03/30/18 1337 03/30/18 1337  flumazenil (ROMAZICON) injection 0.2 mg  As Needed,   Status:  Discontinued      03/30/18 1337 03/30/18 1337  ondansetron (ZOFRAN) injection 4 mg  As Needed,   Status:  Discontinued      03/30/18 1337 03/30/18 1337  metoclopramide (REGLAN) injection 5 mg  Every 15 Minutes PRN,   Status:  Discontinued      03/30/18 1337 03/30/18 1337  meperidine (DEMEROL) injection 12.5 mg  Every 5 Minutes PRN,   Status:  Discontinued      03/30/18 1337 03/30/18 0801  sodium chloride 0.9 % solution  As Needed,   Status:  Discontinued      03/30/18 0801    03/30/18 0801  sodium chloride (NS) irrigation solution  As Needed,   Status:  Discontinued      03/30/18 0801 03/30/18 0801  thrombin spray  As Needed,   Status:  Discontinued      03/30/18 0801 03/30/18 0800  bupivacaine-EPINEPHrine PF (MARCAINE w/EPI) 0.25% -[id number] injection  As Needed,   Status:  Discontinued      03/30/18 0800 03/30/18 0800  gelatin absorbable (GELFOAM) powder  As Needed,   Status:  Discontinued      03/30/18 0800    03/30/18 0759  sodium chloride 1,000 mL with bacitracin 50,000 Units irrigation  As Needed,   Status:  Discontinued      03/30/18 0800    03/30/18 0715  lactated ringers infusion  Continuous,   Status:  Discontinued      03/30/18 0639    03/30/18 0715  lactated ringers infusion  Continuous,   Status:  Discontinued      03/30/18 0643    03/30/18 0642  midazolam (VERSED) injection 1 mg  Every 5 Minutes PRN,   Status:  Discontinued      03/30/18 0643    03/30/18 0642  midazolam (VERSED) injection 2 mg  Every 5  Minutes PRN,   Status:  Discontinued      03/30/18 0643    03/30/18 0642  Vital Signs - Per Anesthesia Protocol  As Needed,   Status:  Canceled      03/30/18 0643    03/30/18 0642  sodium chloride 0.9 % flush 1-10 mL  As Needed,   Status:  Discontinued      03/30/18 0643    03/30/18 0642  buffered lidocaine syringe 0.5 mL  Once As Needed,   Status:  Discontinued      03/30/18 0643    03/30/18 0638  midazolam (VERSED) injection 1 mg  Every 5 Minutes PRN,   Status:  Discontinued      03/30/18 0639    03/30/18 0638  midazolam (VERSED) injection 2 mg  Every 5 Minutes PRN,   Status:  Discontinued      03/30/18 0639    03/30/18 0638  Vital Signs - Per Anesthesia Protocol  As Needed,   Status:  Canceled      03/30/18 0639    03/30/18 0638  sodium chloride 0.9 % flush 1-10 mL  As Needed,   Status:  Discontinued      03/30/18 0639    03/30/18 0638  buffered lidocaine syringe 0.5 mL  Once As Needed,   Status:  Discontinued      03/30/18 0639    03/30/18 0630  lactated ringers infusion 1,000 mL  Continuous,   Status:  Discontinued      03/30/18 0552    03/30/18 0600  lactated ringers infusion  Continuous,   Status:  Discontinued      03/30/18 0527    03/30/18 0600  ceFAZolin (ANCEF) 2 g/20ml IV PUSH syringe  Once      03/30/18 0527    03/30/18 0551  sodium chloride 0.9 % flush 3 mL  As Needed,   Status:  Discontinued      03/30/18 0552    03/30/18 0527  sodium chloride 0.9 % flush 1-10 mL  As Needed,   Status:  Discontinued      03/30/18 0527    --  SCANNED EKG      03/30/18 0000          Operative/Procedure Notes (last 7 days) (Notes from 3/30/2018 10:19 AM through 4/6/2018 10:19 AM)     No notes of this type exist for this encounter.           Physician Progress Notes (last 7 days) (Notes from 3/30/2018 10:19 AM through 4/6/2018 10:19 AM)      Mehran Almaraz MD at 4/3/2018  1:55 PM          Neurosurgery Daily Progress Note    Assessment:   Eric Caldera is a 57 y.o. yo with a significant PMH of htn and depression  "who presented with Nhan S1 and left L5 radiculopathy.  Imaging revealed recurrent L5/S1 disc herniation.      DDX:   Patient Active Problem List   Diagnosis   • Degeneration of lumbar intervertebral disc   • Pain in both lower extremities   • Numbness in feet   • Claudication of both lower extremities   • BMI 28.0-28.9,adult   • Tobacco non-user   • Lumbar disc herniation with radiculopathy       Plan:   Neuro: POD#4 from L5/S1 ALIF and Posterior instrumentation and left L5 hemilaminectomy  CV: DOREEN  Pulm: RA  :  Davis out  FEN: Shannan PO, HLIV  GI: Awaiting BM. Fleets enema, KUB  ID: DOREEN  Heme:  DVT prophylaxis begins today  Pain: Pain controlled.  Gabapentin, valium scheduled.  oxycodone to 15 and schedule tylenol  Dispo: PT/OT, roll walker.  Home today      Chief complaint:   Back pain    Subjective  Complete resolution of leg symptoms.     Temp:  [97.6 °F (36.4 °C)-98.9 °F (37.2 °C)] 98.3 °F (36.8 °C)  Heart Rate:  [72-87] 72  Resp:  [16-20] 18  BP: ()/(55-79) 106/55    Objective:  Vital signs: (most recent): Blood pressure 106/55, pulse 72, temperature 98.3 °F (36.8 °C), temperature source Oral, resp. rate 18, height 176.5 cm (69.49\"), weight 88.5 kg (195 lb 1.7 oz), SpO2 93 %.        Neurologic Exam     Mental Status   Oriented to person, place, and time.   Speech: speech is normal   Level of consciousness: alert    Cranial Nerves     CN III, IV, VI   Pupils are equal, round, and reactive to light.  Extraocular motions are normal.     CN V   Facial sensation intact.     CN VII   Facial expression full, symmetric.     Motor Exam   Right arm pronator drift: absent  Left arm pronator drift: absent    Strength   Right deltoid: 5/5  Left deltoid: 5/5  Right biceps: 5/5  Left biceps: 5/5  Right triceps: 5/5  Left triceps: 5/5  Right iliopsoas: 5/5  Left iliopsoas: 5/5  Right quadriceps: 5/5  Left quadriceps: 5/5  Right gastroc: 5/5  Left gastroc: 5/5    Sensory Exam   Light touch normal.     Incision " CDI    Drains:   [REMOVED] Urethral Catheter Non-latex;Silicone;Double-lumen 16 Fr. (Removed)   Removed 03/30/18 2000   Daily Indications Selected surgeries ( tract, abdomen) 3/30/2018  3:20 PM   Site Assessment Clean;Skin intact 3/30/2018  3:20 PM   Collection Container Standard drainage bag 3/30/2018  3:20 PM   Securement Method Securing device 3/30/2018  3:20 PM   Output (mL) 1200 mL 3/30/2018  7:54 PM       Active Problems:    Lumbar disc herniation with radiculopathy      Lab Results (last 24 hours)     ** No results found for the last 24 hours. **          [id number]  Mehran Almaraz MD      Electronically signed by Mehran Almaraz MD at 4/3/2018  1:55 PM     Mehran Almaraz MD at 4/2/2018  8:27 AM          Neurosurgery Daily Progress Note    Assessment:   Eric Caldera is a 57 y.o. yo with a significant PMH of htn and depression who presented with Nhan S1 and left L5 radiculopathy.  Imaging revealed recurrent L5/S1 disc herniation.      DDX:   Patient Active Problem List   Diagnosis   • Degeneration of lumbar intervertebral disc   • Pain in both lower extremities   • Numbness in feet   • Claudication of both lower extremities   • BMI 28.0-28.9,adult   • Tobacco non-user   • Lumbar disc herniation with radiculopathy       Plan:   Neuro: POD#3 from L5/S1 ALIF and Posterior instrumentation and left L5 hemilaminectomy  CV: DOREEN  Pulm: RA  :  Davis out  FEN: Shannan PO, HLIV  GI: Awaiting BM. Fleets enema, KUB  ID: Post op Abx for 23 hours  Heme:  DVT prophylaxis begins today  Pain: Pain still poorly controlled.  Gabapentin, valium scheduled.  Increase oxycodone to 15 and schedule tylenol  Dispo: PT/OT, anticipate 4-5 days due to home situation      Chief complaint:   Back pain    Subjective  Complete resolution of leg symptoms.     Temp:  [97.9 °F (36.6 °C)-99.6 °F (37.6 °C)] 97.9 °F (36.6 °C)  Heart Rate:  [54-86] 81  Resp:  [16-18] 16  BP: (101-153)/(52-83) 129/83    Objective:  Vital signs:  "(most recent): Blood pressure 129/83, pulse 81, temperature 97.9 °F (36.6 °C), temperature source Oral, resp. rate 16, height 176.5 cm (69.49\"), weight 88.5 kg (195 lb 1.7 oz), SpO2 95 %.        Neurologic Exam     Mental Status   Oriented to person, place, and time.   Speech: speech is normal   Level of consciousness: alert    Cranial Nerves     CN III, IV, VI   Pupils are equal, round, and reactive to light.  Extraocular motions are normal.     CN V   Facial sensation intact.     CN VII   Facial expression full, symmetric.     Motor Exam   Right arm pronator drift: absent  Left arm pronator drift: absent    Strength   Right deltoid: 5/5  Left deltoid: 5/5  Right biceps: 5/5  Left biceps: 5/5  Right triceps: 5/5  Left triceps: 5/5  Right iliopsoas: 5/5  Left iliopsoas: 5/5  Right quadriceps: 5/5  Left quadriceps: 5/5  Right gastroc: 5/5  Left gastroc: 5/5    Sensory Exam   Light touch normal.     Incision CDI    Drains:   [REMOVED] Urethral Catheter Non-latex;Silicone;Double-lumen 16 Fr. (Removed)   Removed 03/30/18 2000   Daily Indications Selected surgeries ( tract, abdomen) 3/30/2018  3:20 PM   Site Assessment Clean;Skin intact 3/30/2018  3:20 PM   Collection Container Standard drainage bag 3/30/2018  3:20 PM   Securement Method Securing device 3/30/2018  3:20 PM   Output (mL) 1200 mL 3/30/2018  7:54 PM       Active Problems:    Lumbar disc herniation with radiculopathy      Lab Results (last 24 hours)     ** No results found for the last 24 hours. **          [id number]  Mehran Almaraz MD      Electronically signed by Mehran Almaraz MD at 4/2/2018  8:29 AM     Mehran Almaraz MD at 4/1/2018 11:34 AM          Neurosurgery Daily Progress Note    Assessment:   Eric Caldera is a 57 y.o. yo with a significant PMH of htn and depression who presented with Nhan S1 and left L5 radiculopathy.  Imaging revealed recurrent L5/S1 disc herniation.      DDX:   Patient Active Problem List   Diagnosis   • " "Degeneration of lumbar intervertebral disc   • Pain in both lower extremities   • Numbness in feet   • Claudication of both lower extremities   • BMI 28.0-28.9,adult   • Tobacco non-user   • Lumbar disc herniation with radiculopathy       Plan:   Neuro: POD#1 from L5/S1 ALIF and Posterior instrumentation and left L5 hemilaminectomy  CV: DOREEN  Pulm: RA  :  Davis out  FEN: Shannan PO, HLIV  GI: Awaiting BM  ID: Post op Abx for 23 hours  Heme:  DVT prophylaxis begins today  Pain: Gabapentin, valium scheduled.  Increase oxycodone to 15 and schedule tylenol  Dispo: PT/OT, anticipate 4-5 days due to home situation      Chief complaint:   Back pain    Subjective  Complete resolution of leg symptoms.     Temp:  [97.4 °F (36.3 °C)-100 °F (37.8 °C)] 98.5 °F (36.9 °C)  Heart Rate:  [83-98] 87  Resp:  [18-20] 18  BP: (105-126)/(52-71) 126/67    Objective:  Vital signs: (most recent): Blood pressure 126/67, pulse 87, temperature 98.5 °F (36.9 °C), temperature source Oral, resp. rate 18, height 176.5 cm (69.49\"), weight 88.5 kg (195 lb 1.7 oz), SpO2 95 %.        Neurologic Exam     Mental Status   Oriented to person, place, and time.   Speech: speech is normal   Level of consciousness: alert    Cranial Nerves     CN III, IV, VI   Pupils are equal, round, and reactive to light.  Extraocular motions are normal.     CN V   Facial sensation intact.     CN VII   Facial expression full, symmetric.     Motor Exam   Right arm pronator drift: absent  Left arm pronator drift: absent    Strength   Right deltoid: 5/5  Left deltoid: 5/5  Right biceps: 5/5  Left biceps: 5/5  Right triceps: 5/5  Left triceps: 5/5  Right iliopsoas: 5/5  Left iliopsoas: 5/5  Right quadriceps: 5/5  Left quadriceps: 5/5  Right gastroc: 5/5  Left gastroc: 5/5    Sensory Exam   Light touch normal.     Incision CDI    Drains:   [REMOVED] Urethral Catheter Non-latex;Silicone;Double-lumen 16 Fr. (Removed)   Removed 03/30/18 2000   Daily Indications Selected surgeries ( " tract, abdomen) 3/30/2018  3:20 PM   Site Assessment Clean;Skin intact 3/30/2018  3:20 PM   Collection Container Standard drainage bag 3/30/2018  3:20 PM   Securement Method Securing device 3/30/2018  3:20 PM   Output (mL) 1200 mL 3/30/2018  7:54 PM       Active Problems:    Lumbar disc herniation with radiculopathy      Lab Results (last 24 hours)     ** No results found for the last 24 hours. **          [id number]  Mehran Almaraz MD      Electronically signed by Mehran Almaraz MD at 4/1/2018 11:38 AM     Mehran Almaraz MD at 3/31/2018  9:17 AM          Neurosurgery Daily Progress Note    Assessment:   Eric Caldera is a 57 y.o. yo with a significant PMH of htn and depression who presented with Nhan S1 and left L5 radiculopathy.  Imaging revealed recurrent L5/S1 disc herniation.      DDX:   Patient Active Problem List   Diagnosis   • Degeneration of lumbar intervertebral disc   • Pain in both lower extremities   • Numbness in feet   • Claudication of both lower extremities   • BMI 28.0-28.9,adult   • Tobacco non-user   • Lumbar disc herniation with radiculopathy       Plan:   Neuro: POD#1 from L5/S1 ALIF and Posterior instrumentation and left L5 hemilaminectomy  CV: DOREEN  Pulm: RA  :  Davis out  FEN: Shannan PO, HLIV  GI: Awaiting BM  ID: Post op Abx for 23 hours  Heme:  DVT prophylaxis begins today  Pain: DC PCA and transition to oral pain meds.  Can not tolerate NSAIDs  Dispo: PT/OT, anticipate 4-5 days due to home situation      Chief complaint:   Back pain    Subjective  Complete resolution of leg symptoms.     Temp:  [97.7 °F (36.5 °C)-98.9 °F (37.2 °C)] 98.6 °F (37 °C)  Heart Rate:  [] 88  Resp:  [14-21] 20  BP: (101-138)/(36-88) 115/74    Objective    Neurologic Exam     Mental Status   Oriented to person, place, and time.   Speech: speech is normal   Level of consciousness: alert    Cranial Nerves     CN III, IV, VI   Pupils are equal, round, and reactive to light.  Extraocular  motions are normal.     CN V   Facial sensation intact.     CN VII   Facial expression full, symmetric.     Motor Exam   Right arm pronator drift: absent  Left arm pronator drift: absent    Strength   Right deltoid: 5/5  Left deltoid: 5/5  Right biceps: 5/5  Left biceps: 5/5  Right triceps: 5/5  Left triceps: 5/5  Right iliopsoas: 5/5  Left iliopsoas: 5/5  Right quadriceps: 5/5  Left quadriceps: 5/5  Right gastroc: 5/5  Left gastroc: 5/5    Sensory Exam   Light touch normal.     Incision CDI    Drains:   [REMOVED] Urethral Catheter Non-latex;Silicone;Double-lumen 16 Fr. (Removed)   Removed 03/30/18 2000   Daily Indications Selected surgeries ( tract, abdomen) 3/30/2018  3:20 PM   Site Assessment Clean;Skin intact 3/30/2018  3:20 PM   Collection Container Standard drainage bag 3/30/2018  3:20 PM   Securement Method Securing device 3/30/2018  3:20 PM   Output (mL) 1200 mL 3/30/2018  7:54 PM       Active Problems:    Lumbar disc herniation with radiculopathy      Lab Results (last 24 hours)     ** No results found for the last 24 hours. **          [id number]  Mehran Almaraz MD      Electronically signed by Mehran Almaraz MD at 3/31/2018  9:21 AM       Consult Notes (last 7 days) (Notes from 03/30/18 through 04/06/18)     No notes of this type exist for this encounter.           Discharge Summary      Mehran Almaraz MD at 4/3/2018  2:01 PM            Date of Discharge:  4/3/2018    Discharge Diagnosis: Repeat herniation of L5/1 nucleus pulposus.  Patient Active Problem List   Diagnosis   • Degeneration of lumbar intervertebral disc   • Pain in both lower extremities   • Numbness in feet   • Claudication of both lower extremities   • BMI 28.0-28.9,adult   • Tobacco non-user     1. Lumbar disc herniation with radiculopathy    2. Decreased activities of daily living (ADL)    3. Impaired mobility        Presenting Problem/History of Present Illness  Lumbar disc herniation with radiculopathy  [M51.16]  Lumbar disc herniation with radiculopathy [M51.16]   1. Lumbar disc herniation with radiculopathy    2. Decreased activities of daily living (ADL)    3. Impaired mobility        Hospital Course  Patient is a 57 y.o. male presented with Recurrent herniation of his L5/S1 intervertebral disc resulting in S1 radiculopathy.  The patient previously had a discectomy performed in the 1980s.  He now presents with recurrent disc herniation.  We discussed the risks benefits and possible complications of conservative management versus repeat discectomy versus fusion.  The patient elected to proceed with fusion.    He was brought to the main operating room and underwent an uneventful anterior lumbar interbody fusion that was assisted by Dr. Robb as well as a posterior L5 to S1 pedicle screw instrumentation and hemilaminotomy with discectomy.  The patient awoke and had complete resolution of his leg symptoms.    He had a small amount of ileus postoperatively which resolved with mag citrate and Senokot.  Otherwise the patient ambulated over 3-4 days and his pain was transitioned from IV pain medication to oral pain medication without difficulty.  He was evaluated by physical therapy and occupational therapy and deemed safe for discharge home with a rolling walker.      Procedures Performed  Procedure(s):  LUMBAR LAMINECTOMY ANTERIOR LUMBAR INTERBODY FUSION, Lumbar 5 to sacral 1 anterior interbody fusion and then posterior MIS L5 to S1 left decompression and instrumentation. O-arm  ANTERIOR LUMBAR EXPOSURE       Consults:   Consults     No orders found from 3/1/2018 to 3/31/2018.          Pertinent Test Results: Intraoperative images with excellent expansion of the bilateral L5/S1 foramen and good placement of instrumentation.    Condition on Discharge:  Improved    Vital Signs  Temp:  [97.6 °F (36.4 °C)-98.9 °F (37.2 °C)] 98.3 °F (36.8 °C)  Heart Rate:  [72-87] 72  Resp:  [16-20] 18  BP: ()/(55-79)  106/55    Physical Exam:   Physical Exam   Constitutional: He is oriented to person, place, and time.   Eyes: EOM are normal. Pupils are equal, round, and reactive to light.   Neurological: He is oriented to person, place, and time.   Psychiatric: His speech is normal.        Neurologic Exam     Mental Status   Oriented to person, place, and time.   Speech: speech is normal   Level of consciousness: alert    Cranial Nerves     CN III, IV, VI   Pupils are equal, round, and reactive to light.  Extraocular motions are normal.     CN V   Facial sensation intact.     CN VII   Facial expression full, symmetric.     Motor Exam   Right arm pronator drift: absent  Left arm pronator drift: absent    Strength   Right deltoid: 5/5  Left deltoid: 5/5  Right biceps: 5/5  Left biceps: 5/5  Right triceps: 5/5  Left triceps: 5/5  Right iliopsoas: 5/5  Left iliopsoas: 5/5  Right quadriceps: 5/5  Left quadriceps: 5/5  Right gastroc: 5/5  Left gastroc: 5/5    Sensory Exam   Light touch normal.    incisions clean dry and intact     Discharge Disposition  Home or Self Care    Discharge Medications   Eric Caldera   Home Medication Instructions ALISTAIR:[id number]    Printed on:04/03/18 1401   Medication Information                      acetaminophen (TYLENOL) 325 MG tablet  Take 2 tablets by mouth Every 6 (Six) Hours for 30 days.             diazePAM (VALIUM) 5 MG tablet  Take 1 tablet by mouth Every 8 (Eight) Hours As Needed for Muscle Spasms.             gabapentin (NEURONTIN) 100 MG capsule  Take 1 capsule by mouth Every 8 (Eight) Hours for 30 days.             hydrOXYzine (ATARAX) 25 MG tablet  Take 25 mg by mouth 3 (Three) Times a Day As Needed for Itching.             levothyroxine (SYNTHROID, LEVOTHROID) 75 MCG tablet  Take 100 mcg by mouth Daily.             oxyCODONE (ROXICODONE) 15 MG immediate release tablet  Take 1 tablet by mouth Every 4 (Four) Hours As Needed for Moderate Pain  (Begin to space out medication) for up to 8  days.             sennosides-docusate sodium (SENOKOT-S) 8.6-50 MG tablet  Take 2 tablets by mouth 2 (Two) Times a Day for 30 days. While taking pain medications to prevent constipation.             sertraline (ZOLOFT) 50 MG tablet  Take 150 mg by mouth Daily.                 Discharge Diet:  regular    Activity at Discharge:  as tolerated    Follow-up Appointments  No future appointments.  Additional Instructions for the Follow-ups that You Need to Schedule     Ambulatory Referral to Occupational Therapy    As directed      Ambulatory Referral to Physical Therapy    As directed            Test Results Pending at Discharge       Mehran Almaraz MD  04/03/18  2:01 PM    Time: Discharge 20 min      Electronically signed by Mehran Almaraz MD at 4/4/2018 10:38 AM

## 2018-04-16 ENCOUNTER — OFFICE VISIT (OUTPATIENT)
Dept: NEUROSURGERY | Facility: CLINIC | Age: 58
End: 2018-04-16

## 2018-04-16 DIAGNOSIS — M51.36 DEGENERATION OF LUMBAR INTERVERTEBRAL DISC: Primary | ICD-10-CM

## 2018-04-16 DIAGNOSIS — Z78.9 TOBACCO NON-USER: ICD-10-CM

## 2018-04-16 DIAGNOSIS — Z98.1 S/P LUMBAR FUSION: ICD-10-CM

## 2018-04-16 PROCEDURE — 99024 POSTOP FOLLOW-UP VISIT: CPT | Performed by: NEUROLOGICAL SURGERY

## 2018-04-16 RX ORDER — HYDROCODONE BITARTRATE AND ACETAMINOPHEN 10; 325 MG/1; MG/1
1 TABLET ORAL TAKE AS DIRECTED
Qty: 63 TABLET | Refills: 0 | Status: SHIPPED | OUTPATIENT
Start: 2018-04-16 | End: 2018-05-07

## 2018-04-16 RX ORDER — DIAZEPAM 5 MG/1
5 TABLET ORAL EVERY 8 HOURS PRN
Qty: 60 TABLET | Refills: 0 | Status: SHIPPED | OUTPATIENT
Start: 2018-04-16 | End: 2018-05-09 | Stop reason: SDUPTHER

## 2018-04-16 NOTE — PROGRESS NOTES
"Chief complaint:   Chief Complaint   Patient presents with   • Post-op     Underwent lumbar fusion on 03/30/18. Having increased pain.        Subjective     HPI:   Interval History: Mohamud describes persistent and worsening leg pain.  He has no abnormal numbness.  numbness.  He describes numbness from the waist down.  He states he has had difficulty with ambulation.  His pain is in a nondermatomal distribution.  He has taken all this current pain medications and would like a refill on both his narcotic and is Valium.  He has no bowel or bladder disturbances.  He is also complaining of some mild subumbilical pain associated with his abdominal incision.  He has been compliant with his brace.  He is spending most of his day in bed.    PFSH:  Past Medical History:   Diagnosis Date   • Arthritis    • Depression    • Hypertension    • Kidney stones    • Migraine    • Thyroid disease        Past Surgical History:   Procedure Laterality Date   • ANTERIOR LUMBAR EXPOSURE N/A 3/30/2018    Procedure: ANTERIOR LUMBAR EXPOSURE;  Surgeon: Samm Robb DO;  Location:  [facility] OR;  Service: Vascular   • ARM TENDON REPAIR Left 2011    \"Rebuilding\" of tendons   • LEG SURGERY Right 2009    Removal of bone tumor   • LUMBAR LAMINECTOMY ANTERIOR LUMBAR INTERBODY FUSION Left 3/30/2018    Procedure: LUMBAR LAMINECTOMY ANTERIOR LUMBAR INTERBODY FUSION, Lumbar 5 to sacral 1 anterior interbody fusion and then posterior MIS L5 to S1 left decompression and instrumentation. O-arm;  Surgeon: Mehran Almaraz MD;  Location:  [facility] OR;  Service: Neurosurgery   • LUMBAR SPINE SURGERY  2005       Objective      Current Outpatient Prescriptions   Medication Sig Dispense Refill   • acetaminophen (TYLENOL) 325 MG tablet Take 2 tablets by mouth Every 6 (Six) Hours for 30 days. 240 tablet 0   • diazePAM (VALIUM) 5 MG tablet Take 1 tablet by mouth Every 8 (Eight) Hours As Needed for Muscle Spasms. 60 tablet 0   • gabapentin (NEURONTIN) 100 MG " capsule Take 1 capsule by mouth Every 8 (Eight) Hours for 30 days. 90 capsule 0   • HYDROcodone-acetaminophen (NORCO)  MG per tablet Take 1 tablet by mouth Take As Directed for 21 days. Take Q6H x 1 week, Q8H x 1 week then Q12H 63 tablet 0   • hydrOXYzine (ATARAX) 25 MG tablet Take 25 mg by mouth 3 (Three) Times a Day As Needed for Itching.     • levothyroxine (SYNTHROID, LEVOTHROID) 75 MCG tablet Take 100 mcg by mouth Daily.     • sennosides-docusate sodium (SENOKOT-S) 8.6-50 MG tablet Take 2 tablets by mouth 2 (Two) Times a Day for 30 days. While taking pain medications to prevent constipation. 120 tablet 0   • sertraline (ZOLOFT) 50 MG tablet Take 150 mg by mouth Daily.       No current facility-administered medications for this visit.        Vital Signs  There were no vitals taken for this visit.  Physical Exam   Constitutional: He is oriented to person, place, and time.   Eyes: EOM are normal. Pupils are equal, round, and reactive to light.   Neurological: He is oriented to person, place, and time.   Psychiatric: His speech is normal.     Neurologic Exam     Mental Status   Oriented to person, place, and time.   Speech: speech is normal   Level of consciousness: alert    Cranial Nerves     CN III, IV, VI   Pupils are equal, round, and reactive to light.  Extraocular motions are normal.     CN V   Facial sensation intact.     CN VII   Facial expression full, symmetric.     Motor Exam   Right arm pronator drift: absent  Left arm pronator drift: absent    Strength   Right deltoid: 5/5  Left deltoid: 5/5  Right biceps: 5/5  Left biceps: 5/5  Right triceps: 5/5  Left triceps: 5/5  Right iliopsoas: 5/5  Left iliopsoas: 5/5  Right quadriceps: 5/5  Left quadriceps: 5/5  Right gastroc: 5/5  Left gastroc: 5/5    Sensory Exam   Light touch normal.       Incision: Both abdominal and back are clean dry and intact.      Results Review: Lumbar x-rays pending.      Assessment/Plan:   1. Degeneration of lumbar  intervertebral disc    2. BMI 28.0-28.9,adult    3. Tobacco non-user    4. S/P lumbar fusion      Mohamud is complaining of pain from his waist down.  Given that he had an L5/S1 procedure this is unlikely be from nerve compression.  More likely this is to be referred pain from his back.  I encouraged him to ambulate more readily.  We will give him a prescription for physical therapy and occupational therapy.  We will order x-rays today for reassurance.  Both his Valium and Norco have been refilled.  He was provided a narcotic taper.  Finally staples were removed today.    I discussed the patients findings and my recommendations with patient    Mehran Almaraz MD

## 2018-04-17 ENCOUNTER — TELEPHONE (OUTPATIENT)
Dept: NEUROSURGERY | Facility: CLINIC | Age: 58
End: 2018-04-17

## 2018-04-17 NOTE — TELEPHONE ENCOUNTER
"Patient has called me multiple times over the past week. Mainly regarding medications. Initially, because Percocet was not covered by his insurance and I had filed a PA but this was denied. Patient accused me of never filing this, which I did. Then, because his Norco was also not covered. At one point he paid $50 for his medication and contacted the office requesting reimbursement, which I explained we did not do and he should have called me prior to picking up the Rx. During his message on 04/09/18 he has failed to hang up his phone and has proceeds to have a background conversation with another male that is very explicit and vulgar directed towards myself. He calls a total of 9 times on 04/16/18 and two more times leaving 2 voicemails, during both of these messages he also did not hang up the phone having very explicit background conversations regarding myself and our office. He discussed how we wrote his Rx's incorrectly and how \"we can't treat people this way without consequences\", and that \"we'll find out\". Today, he calls and upon answering he is very upset that I have not returned his calls back. I have explained that him calling multiple times daily while I am in clinic, I can not keep up with every single time he has called. He is demanding that either Dr. Almaraz or I need to call [facility] in [place] to pay $50 for his [place] Rx because we wrote it incorrectly and now his insurance will not pay. I explained that per [facility] his insurance will not cover because he is picking up the Rx too early for insurance coverage, he will be covered for medication on 04/18/18 per [facility]. He states that he does not have \"$50 to give up every week because you failed to write the Rx correctly\". I apologize for the inconvenience but I ordered the Rx per Dr. Almaraz's request. Patient then hangs up the phone in anger. Patient has been my extension making both verbal and sexually harrassing phone calls. I have made both " Dr. Almaraz and Jazmyn () aware of this.

## 2018-04-18 DIAGNOSIS — M51.36 DEGENERATION OF LUMBAR INTERVERTEBRAL DISC: Primary | ICD-10-CM

## 2018-04-18 DIAGNOSIS — Z98.1 S/P LUMBAR FUSION: ICD-10-CM

## 2018-05-08 ENCOUNTER — TELEPHONE (OUTPATIENT)
Dept: NEUROSURGERY | Facility: CLINIC | Age: 58
End: 2018-05-08

## 2018-05-08 NOTE — TELEPHONE ENCOUNTER
"Patient calls, leaving voicemail on my extension. He complains of L hip pain, incisional pain and \"pulsations\" in his R leg. I have attempted to contact the patient back, no answer, left message. Advised the patient if he was having complications we needed to see him in office. I asked that he call back to be given a sooner appointment than his regularly scheduled appointment on 05/16/18. I never heard back from patient.  "

## 2018-05-09 ENCOUNTER — HOSPITAL ENCOUNTER (OUTPATIENT)
Dept: GENERAL RADIOLOGY | Facility: HOSPITAL | Age: 58
Discharge: HOME | End: 2018-05-09
Attending: NEUROLOGICAL SURGERY | Admitting: NEUROLOGICAL SURGERY

## 2018-05-09 ENCOUNTER — OFFICE VISIT (OUTPATIENT)
Dept: NEUROSURGERY | Facility: CLINIC | Age: 58
End: 2018-05-09

## 2018-05-09 VITALS — BODY MASS INDEX: 28.14 KG/M2 | WEIGHT: 190 LBS | HEIGHT: 69 IN

## 2018-05-09 DIAGNOSIS — Z78.9 TOBACCO NON-USER: ICD-10-CM

## 2018-05-09 DIAGNOSIS — M51.36 DEGENERATION OF LUMBAR INTERVERTEBRAL DISC: ICD-10-CM

## 2018-05-09 DIAGNOSIS — M51.36 DEGENERATION OF LUMBAR INTERVERTEBRAL DISC: Primary | ICD-10-CM

## 2018-05-09 DIAGNOSIS — Z98.1 S/P LUMBAR FUSION: ICD-10-CM

## 2018-05-09 PROCEDURE — 99024 POSTOP FOLLOW-UP VISIT: CPT | Performed by: NEUROLOGICAL SURGERY

## 2018-05-09 PROCEDURE — 72100 X-RAY EXAM L-S SPINE 2/3 VWS: CPT

## 2018-05-09 RX ORDER — GABAPENTIN 300 MG/1
300 CAPSULE ORAL 3 TIMES DAILY
Qty: 90 CAPSULE | Refills: 0 | Status: SHIPPED | OUTPATIENT
Start: 2018-05-09 | End: 2018-06-08

## 2018-05-09 RX ORDER — DIAZEPAM 5 MG/1
5 TABLET ORAL EVERY 8 HOURS PRN
Qty: 30 TABLET | Refills: 0 | Status: SHIPPED | OUTPATIENT
Start: 2018-05-09 | End: 2018-06-25 | Stop reason: SDUPTHER

## 2018-05-09 RX ORDER — ZOLPIDEM TARTRATE 6.25 MG/1
6.25 TABLET, FILM COATED, EXTENDED RELEASE ORAL NIGHTLY PRN
Qty: 10 TABLET | Refills: 0 | Status: SHIPPED | OUTPATIENT
Start: 2018-05-09 | End: 2018-05-19

## 2018-05-10 ENCOUNTER — TELEPHONE (OUTPATIENT)
Dept: NEUROSURGERY | Facility: CLINIC | Age: 58
End: 2018-05-10

## 2018-05-10 NOTE — TELEPHONE ENCOUNTER
Patient calls stating that his Ambien CR is not covered by insurance, but that the non-extended release would be. I have contacted [facility] pharmacy, spoke with Maida. We have changed Rx to Ambien 5mg QHS x 10 days with no refills.

## 2018-05-24 ENCOUNTER — HOSPITAL ENCOUNTER (OUTPATIENT)
Dept: MRI IMAGING | Facility: HOSPITAL | Age: 58
Discharge: HOME | End: 2018-05-24
Attending: NEUROLOGICAL SURGERY | Admitting: NEUROLOGICAL SURGERY

## 2018-05-24 DIAGNOSIS — Z98.1 S/P LUMBAR FUSION: ICD-10-CM

## 2018-05-24 DIAGNOSIS — M51.36 DEGENERATION OF LUMBAR INTERVERTEBRAL DISC: ICD-10-CM

## 2018-05-24 LAB — CREAT BLDA-MCNC: 1.1 MG/DL (ref 0.6–1.3)

## 2018-05-24 PROCEDURE — A9577 INJ MULTIHANCE: HCPCS | Performed by: NEUROLOGICAL SURGERY

## 2018-05-24 PROCEDURE — 0 GADOBENATE DIMEGLUMINE 529 MG/ML SOLUTION: Performed by: NEUROLOGICAL SURGERY

## 2018-05-24 PROCEDURE — 82565 ASSAY OF CREATININE: CPT

## 2018-05-24 PROCEDURE — 72158 MRI LUMBAR SPINE W/O & W/DYE: CPT

## 2018-05-24 RX ADMIN — GADOBENATE DIMEGLUMINE 17 ML: 529 INJECTION, SOLUTION INTRAVENOUS at 14:45

## 2018-05-25 NOTE — PROGRESS NOTES
Please let Eric know that his MRI great.  There is no compression on that nerve.  He needs to continue with physical therapy and daily activities.  If he still has pain we can look at a nerve conduction study at his next appt.

## 2018-06-05 DIAGNOSIS — Z98.1 S/P LUMBAR FUSION: ICD-10-CM

## 2018-06-05 DIAGNOSIS — M51.36 DEGENERATION OF LUMBAR INTERVERTEBRAL DISC: ICD-10-CM

## 2018-06-08 ENCOUNTER — DOCUMENTATION (OUTPATIENT)
Dept: NEUROSURGERY | Facility: CLINIC | Age: 58
End: 2018-06-08

## 2018-06-08 NOTE — PROGRESS NOTES
Received papers from [employer], requesting Dr. Almaraz to fill out for the patient to obtain long term disability. After speaking with Dr. Almaraz - he is willing to complete paperwork, but he wants to make sure the patient is aware that he understands that Dr. Almaraz is not a , and that he will not recommend long term disability. Also, the cost of this form is $200. I have tried to contact the patient today but his number is disconnected. Therefore, I am sending back the paperwork with decline to complete.

## 2018-06-25 ENCOUNTER — OFFICE VISIT (OUTPATIENT)
Dept: NEUROSURGERY | Facility: CLINIC | Age: 58
End: 2018-06-25

## 2018-06-25 VITALS — BODY MASS INDEX: 27.4 KG/M2 | WEIGHT: 185 LBS | HEIGHT: 69 IN

## 2018-06-25 DIAGNOSIS — R25.1 TREMOR: ICD-10-CM

## 2018-06-25 DIAGNOSIS — M51.36 DEGENERATION OF LUMBAR INTERVERTEBRAL DISC: ICD-10-CM

## 2018-06-25 DIAGNOSIS — Z78.9 TOBACCO NON-USER: ICD-10-CM

## 2018-06-25 DIAGNOSIS — Z98.1 S/P LUMBAR FUSION: ICD-10-CM

## 2018-06-25 PROCEDURE — 99024 POSTOP FOLLOW-UP VISIT: CPT | Performed by: NEUROLOGICAL SURGERY

## 2018-06-25 RX ORDER — PREGABALIN 100 MG/1
100 CAPSULE ORAL DAILY
Qty: 30 CAPSULE | Refills: 6 | Status: SHIPPED | OUTPATIENT
Start: 2018-06-25 | End: 2018-06-27 | Stop reason: SDUPTHER

## 2018-06-25 RX ORDER — DIAZEPAM 5 MG/1
5 TABLET ORAL EVERY 8 HOURS PRN
Qty: 30 TABLET | Refills: 4 | Status: SHIPPED | OUTPATIENT
Start: 2018-06-25 | End: 2018-06-25 | Stop reason: SDUPTHER

## 2018-06-25 RX ORDER — PREGABALIN 100 MG/1
100 CAPSULE ORAL DAILY
Qty: 30 CAPSULE | Refills: 6 | Status: SHIPPED | OUTPATIENT
Start: 2018-06-25 | End: 2018-06-25 | Stop reason: SDUPTHER

## 2018-06-25 RX ORDER — DIAZEPAM 5 MG/1
5 TABLET ORAL EVERY 8 HOURS PRN
Qty: 30 TABLET | Refills: 4 | Status: SHIPPED | OUTPATIENT
Start: 2018-06-25 | End: 2018-10-15 | Stop reason: RX
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
## 2018-06-27 RX ORDER — PREGABALIN 100 MG/1
100 CAPSULE ORAL DAILY
Qty: 30 CAPSULE | Refills: 6 | Status: CANCELLED | OUTPATIENT
Start: 2018-06-27 | End: 2018-07-27

## 2018-06-27 RX ORDER — PREGABALIN 100 MG/1
100 CAPSULE ORAL DAILY
Qty: 30 CAPSULE | Refills: 6 | Status: SHIPPED | OUTPATIENT
Start: 2018-06-27 | End: 2018-07-27

## 2018-08-13 ENCOUNTER — OFFICE VISIT (OUTPATIENT)
Dept: NEUROLOGY | Facility: CLINIC | Age: 58
End: 2018-08-13

## 2018-08-13 VITALS
HEIGHT: 69 IN | HEART RATE: 70 BPM | DIASTOLIC BLOOD PRESSURE: 72 MMHG | WEIGHT: 190 LBS | BODY MASS INDEX: 28.14 KG/M2 | RESPIRATION RATE: 18 BRPM | SYSTOLIC BLOOD PRESSURE: 120 MMHG

## 2018-08-13 DIAGNOSIS — R25.1 TREMOR: Primary | ICD-10-CM

## 2018-08-13 PROCEDURE — 99204 OFFICE O/P NEW MOD 45 MIN: CPT | Performed by: PSYCHIATRY & NEUROLOGY

## 2018-08-13 RX ORDER — CYCLOBENZAPRINE HCL 5 MG
5 TABLET ORAL 3 TIMES DAILY PRN
COMMUNITY
End: 2019-04-15

## 2018-08-13 NOTE — PROGRESS NOTES
Subjective   Eric Caldera, 1960, is a male who is being seen today for   Chief Complaint   Patient presents with   • Pain     left leg and hip       HISTORY OF PRESENT ILLNESS: Patient seen for tremor.  He notices mostly when he gets under stress.  It seems to be more with intention.  Notices more in his left leg and his right leg at first and then in his hands and arms.  He has no family history of tremor.  Patient has occasional occipital headache.  Patient has chronic back pain, and followed by neurosurgery and pain management.  He has pain in his left groin area and is followed by neurosurgery.  Patient had previous lumbar fusion.  He says he has a knot in his left back secondary to that.  Patient has noticed tremor for 20+ years.  Patient had EMG and nerve conduction done this year.  Patient had baseline blood work done this year.  MRI lumbar spine done this year.    REVIEW OF SYSTEMS:   GENERAL: As above  PULMONARY: No acute respiratory difficulties  CVS:  No acute chest pain or palpitation  GASTROINTESTINAL: No acute GI distress  GENITOURINARY: No acute  distress  GYN: Not applicable  MUSCULOSKELETAL: No acute musculoskeletal symptoms other than as noted above  HEENT:  No acute vision or hearing change  ENDOCRINE:  No acute endocrine symptoms  PSYCHIATRIC: No acute psychiatric symptoms  HEMATOLOGY: No anemia  SKIN: No acute skin change  Family history reviewed and otherwise noncontributory  Social history: Patient denies smoking or drug or alcohol use      PHYSICAL EXAMINATION:    GENERAL: No acute distress.  I see minimal tremor with intention and no significant tremor at rest and no cogwheeling or rigidity.  CRANIUM: Normocephalic/atraumatic/atraumatic  HEENT: No acute fundic abnormalities.  Pupils equal round reactive to light.       EYES: EOMs intact without nystagmus and fields full to confrontation       EARS:  Tympanic membranes obscured by wax bilaterally/ patient does hear tuning fork  bilaterally       THROAT: No oropharynx abnormalities       NECK:  No bruits/no lymphadenopathy  CHEST: No acute cardiopulmonary abnormalities by auscultation  ABDOMEN: Nondistended  EXTREMITIES: Pulses symmetrical  NEURO: Patient alert and follows commands without difficulty  SPEECH:  Normal    CRANIAL NERVES:  Motor sensory about the face normal and symmetric  EXTREMITIES:  Ankle-brachial indices 1.2 bilaterally  MOTOR STRENGTH:  Motor strength upper and lower extremities normal  STATION AND GAIT:  Gait normal/Romberg negative with no festination  CEREBELLAR:  Finger-nose and heel shin normal  SENSORY:  Normal pin and vibration upper and lower extremities  REFLEXES:  Reflexes present symmetric upper and lower extremities without Babinski's or clonus      ASSESSMENT AND PLAN:  Patient with slight intentional tremor but we will be watched  Symptomatically.  Patient to get baseline blood work.Patient's Body mass index is 28.06 kg/m². BMI is above normal parameters. Recommendations include: no follow-up required.      Eric was seen today for pain.    Diagnoses and all orders for this visit:    Tremor  -     CBC & Differential; Future  -     Comprehensive Metabolic Panel; Future  -     Lipid Panel; Future  -     Magnesium; Future  -     Sedimentation Rate; Future  -     T4, Free; Future  -     Vitamin B12; Future  -     Folate; Future    Other orders  -     Cancel: C-reactive Protein; Future  -     Cancel: US Arterial Doppler Lower Extremity Bilateral; Future  -     Cancel: CK; Future  -     Cancel: MRI Brain Without Contrast; Future  -     Cancel: EEG; Future

## 2018-09-05 ENCOUNTER — TELEPHONE (OUTPATIENT)
Dept: NEUROSURGERY | Facility: CLINIC | Age: 58
End: 2018-09-05

## 2018-09-05 DIAGNOSIS — M79.605 PAIN OF LEFT LOWER EXTREMITY: Primary | ICD-10-CM

## 2018-09-05 NOTE — TELEPHONE ENCOUNTER
Patient has called complaining of sudden onset L leg pain, also causing him to walk with a cane. This started 1 day ago. Per Dr. Almaraz, will offer physical therapy and pain management. Patient has refused referral to pain management. Have mailed order for physical therapy to patient.

## 2018-09-11 NOTE — TELEPHONE ENCOUNTER
Patient calls, again, still complaining of L leg pain. I question if he has started physical therapy, at first he says yes and that it is causing him more pain but after questioning the facility he is going to he actually is not going and has not scheduled any appointments. I have explained to the patient that we can not offer any testing due to his insurance requiring documented tried/failed conservative treatment and at the current time of his last appointment he did not require any further surgical intervention. I have encouraged him to at least try therapy.I have also explained we will not managed or prescribe any pain medications. He seems upset. I offer a referral to pain management again, he declines. Upon further investigation, I have contacted [facility] and the patient has recently self discharged himself cancelling all future appointments. I have advised patient to contact me after completion of physical therapy.

## 2018-09-12 ENCOUNTER — TELEPHONE (OUTPATIENT)
Dept: NEUROSURGERY | Facility: CLINIC | Age: 58
End: 2018-09-12

## 2018-09-12 NOTE — TELEPHONE ENCOUNTER
"Patient calls again. Stating that he has gone to therapy, but they do not have an order (I did not send an order to a facility, it was mailed to the patient and he was advised of this). Also, that he has developed \"bruising at his front incision site\" ? His surgery was 6 months ago, and at his last appointment the incision was well healed. He has also informed me on this voicemail that he is going to the local ED since he is in so much pain. I have contacted [facility] and spoke with them regarding the patient. Explained that he has called on several occasions with pain complaints, but has been noncompliant with our requests. Also, that he has self discharged himself from pain management and we will not manage any medication requests that he may have. She voiced understanding. She stated she would contact us if our services were needed.  "

## 2018-09-13 DIAGNOSIS — R25.1 TREMOR: ICD-10-CM

## 2018-10-15 ENCOUNTER — OFFICE VISIT (OUTPATIENT)
Dept: NEUROLOGY | Facility: CLINIC | Age: 58
End: 2018-10-15

## 2018-10-15 VITALS
DIASTOLIC BLOOD PRESSURE: 72 MMHG | HEIGHT: 69 IN | BODY MASS INDEX: 28.88 KG/M2 | SYSTOLIC BLOOD PRESSURE: 136 MMHG | HEART RATE: 72 BPM | RESPIRATION RATE: 18 BRPM | WEIGHT: 195 LBS

## 2018-10-15 DIAGNOSIS — R25.1 TREMOR: Primary | ICD-10-CM

## 2018-10-15 PROCEDURE — 99214 OFFICE O/P EST MOD 30 MIN: CPT | Performed by: PSYCHIATRY & NEUROLOGY

## 2018-10-15 RX ORDER — PROPRANOLOL HYDROCHLORIDE 10 MG/1
TABLET ORAL
Qty: 30 TABLET | Refills: 1 | Status: SHIPPED | OUTPATIENT
Start: 2018-10-15 | End: 2018-11-19 | Stop reason: SDUPTHER

## 2018-10-15 NOTE — PROGRESS NOTES
Subjective   Eric Caldera, 1960, is a male who is being seen today for   Chief Complaint   Patient presents with   • Tremors       HISTORY OF PRESENT ILLNESS: Extended follow-up.  Patient seen for tremor.  Patient still having pain in his left groin is to get back with Dr. Almaraz /pain management and PCP about that.  I felt no masses in the groin and no specific tenderness to palpation.  Patient thinks his tremor is worse.  Patient said it got very severe during his disability hearing.  Patient has positive family history: His father has tremor.    REVIEW OF SYSTEMS:   GENERAL: Intentional tremor noted without cogwheeling or rigidity  PULMONARY: No acute respiratory difficulties and no history of asthma  CVS:  No acute chest pain or palpitation  GASTROINTESTINAL: No acute GI distress  GENITOURINARY: No acute  distress  GYN: Not applicable  MUSCULOSKELETAL: Post spinal fusion  HEENT:  No acute vision or hearing change  ENDOCRINE:  No acute endocrine symptoms  PSYCHIATRIC: No acute psychiatric symptoms  HEMATOLOGY: No anemia  SKIN: No acute skin changes  Family history reviewed and otherwise noncontributory other than as above  Social history: Patient denies smoking or drug or alcohol use    PHYSICAL EXAMINATION:    GENERAL: Patient's blood pressure 136/72 left arm sitting 130/80 to left arm standing and pulse 72  CRANIUM: Normocephalic/atraumatic  HEENT: No acute fundic abnormalities.  Pupils equal round reactive to light.       EYES: EOMs intact without nystagmus and fields full to confrontation       EARS:  Tympanic membrane obscured on the right but hears tuning fork bilaterally       THROAT: No oropharynx abnormalities       NECK:  No bruits/no lymphadenopathy  CHEST: No acute cardiopulmonary abnormalities by auscultation  ABDOMEN: Nondistended  EXTREMITIES: Pulses symmetrical  NEURO: Patient alert and follows commands without difficulty  SPEECH:  Normal    CRANIAL NERVES:  Motor sensory about the  face normal and symmetric  MOTOR STRENGTH:  Motor strength upper and lower extremities normal  STATION AND GAIT:  Gait normal/Romberg negative  CEREBELLAR:  Finger-nose and heel shin normal  SENSORY:  Pin and vibration upper and lower extremities normal  REFLEXES:  Reflexes present and symmetric upper and lower extremities without Babinski's      ASSESSMENT AND PLAN:  Patient with familial tremor.  Patient to start low-dose propranolol watching for side effects which are discussed.  Patient not to be driving for the first 5 days after starting the propranolol.Patient's Body mass index is 28.8 kg/m². BMI is above normal parameters. Recommendations include: referral to primary care.  I spent 25 minutes with this patient 15 minutes counseling.      Eric was seen today for tremors.    Diagnoses and all orders for this visit:    Tremor    Other orders  -     propranolol (INDERAL) 10 MG tablet; Take 1 tablet by mouth every night

## 2018-10-15 NOTE — PATIENT INSTRUCTIONS
Patient not be driving for 5 days after starting the propranolol.  Patient get with PCP about weight and diet control

## 2018-11-15 ENCOUNTER — OFFICE VISIT (OUTPATIENT)
Dept: NEUROSURGERY | Facility: CLINIC | Age: 58
End: 2018-11-15

## 2018-11-15 VITALS — WEIGHT: 199 LBS | BODY MASS INDEX: 29.47 KG/M2 | HEIGHT: 69 IN

## 2018-11-15 DIAGNOSIS — M79.605 PAIN OF LEFT LOWER EXTREMITY: ICD-10-CM

## 2018-11-15 DIAGNOSIS — R20.0 NUMBNESS OF LEFT FOOT: ICD-10-CM

## 2018-11-15 DIAGNOSIS — Z98.1 S/P LUMBAR FUSION: ICD-10-CM

## 2018-11-15 DIAGNOSIS — M51.36 DEGENERATION OF LUMBAR INTERVERTEBRAL DISC: Primary | ICD-10-CM

## 2018-11-15 DIAGNOSIS — Z87.891 FORMER TOBACCO USE: ICD-10-CM

## 2018-11-15 PROCEDURE — 99212 OFFICE O/P EST SF 10 MIN: CPT | Performed by: NEUROLOGICAL SURGERY
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
## 2018-11-19 ENCOUNTER — LAB (OUTPATIENT)
Dept: LAB | Facility: HOSPITAL | Age: 58
End: 2018-11-19

## 2018-11-19 ENCOUNTER — OFFICE VISIT (OUTPATIENT)
Dept: NEUROLOGY | Facility: CLINIC | Age: 58
End: 2018-11-19

## 2018-11-19 VITALS
WEIGHT: 188 LBS | BODY MASS INDEX: 27.85 KG/M2 | SYSTOLIC BLOOD PRESSURE: 124 MMHG | HEIGHT: 69 IN | DIASTOLIC BLOOD PRESSURE: 80 MMHG | RESPIRATION RATE: 18 BRPM | HEART RATE: 92 BPM

## 2018-11-19 DIAGNOSIS — R25.1 TREMOR: Primary | ICD-10-CM

## 2018-11-19 DIAGNOSIS — R06.02 SHORTNESS OF BREATH: ICD-10-CM

## 2018-11-19 DIAGNOSIS — G89.29 CHRONIC NONINTRACTABLE HEADACHE, UNSPECIFIED HEADACHE TYPE: ICD-10-CM

## 2018-11-19 DIAGNOSIS — R51.9 CHRONIC NONINTRACTABLE HEADACHE, UNSPECIFIED HEADACHE TYPE: ICD-10-CM

## 2018-11-19 DIAGNOSIS — R25.1 TREMOR: ICD-10-CM

## 2018-11-19 DIAGNOSIS — R42 DIZZINESS: ICD-10-CM

## 2018-11-19 DIAGNOSIS — Z87.891 FORMER TOBACCO USE: ICD-10-CM

## 2018-11-19 LAB
ALBUMIN SERPL-MCNC: 5 G/DL (ref 3.5–5)
ALBUMIN/GLOB SERPL: 1.3 G/DL (ref 1.1–2.5)
ALP SERPL-CCNC: 130 U/L (ref 24–120)
ALT SERPL W P-5'-P-CCNC: 22 U/L (ref 0–54)
ANION GAP SERPL CALCULATED.3IONS-SCNC: 12 MMOL/L (ref 4–13)
ARTICHOKE IGE QN: 226 MG/DL (ref 0–99)
AST SERPL-CCNC: 35 U/L (ref 7–45)
BASOPHILS # BLD AUTO: 0.07 10*3/MM3 (ref 0–0.2)
BASOPHILS NFR BLD AUTO: 0.6 % (ref 0–2)
BILIRUB SERPL-MCNC: 0.8 MG/DL (ref 0.1–1)
BUN BLD-MCNC: 14 MG/DL (ref 5–21)
BUN/CREAT SERPL: 10.6 (ref 7–25)
CALCIUM SPEC-SCNC: 10 MG/DL (ref 8.4–10.4)
CHLORIDE SERPL-SCNC: 97 MMOL/L (ref 98–110)
CHOLEST SERPL-MCNC: 343 MG/DL (ref 130–200)
CO2 SERPL-SCNC: 28 MMOL/L (ref 24–31)
CREAT BLD-MCNC: 1.32 MG/DL (ref 0.5–1.4)
DEPRECATED RDW RBC AUTO: 39.8 FL (ref 40–54)
EOSINOPHIL # BLD AUTO: 0.19 10*3/MM3 (ref 0–0.7)
EOSINOPHIL NFR BLD AUTO: 1.6 % (ref 0–4)
ERYTHROCYTE [DISTWIDTH] IN BLOOD BY AUTOMATED COUNT: 13.2 % (ref 12–15)
ERYTHROCYTE [SEDIMENTATION RATE] IN BLOOD: 4 MM/HR (ref 0–15)
FOLATE SERPL-MCNC: >20 NG/ML
GFR SERPL CREATININE-BSD FRML MDRD: 56 ML/MIN/1.73
GLOBULIN UR ELPH-MCNC: 4 GM/DL
GLUCOSE BLD-MCNC: 94 MG/DL (ref 70–100)
HCT VFR BLD AUTO: 49.4 % (ref 40–52)
HDLC SERPL-MCNC: 46 MG/DL
HGB BLD-MCNC: 16.7 G/DL (ref 14–18)
IMM GRANULOCYTES # BLD: 0.04 10*3/MM3 (ref 0–0.03)
IMM GRANULOCYTES NFR BLD: 0.3 % (ref 0–5)
LDLC/HDLC SERPL: 4.96 {RATIO}
LYMPHOCYTES # BLD AUTO: 2.93 10*3/MM3 (ref 0.72–4.86)
LYMPHOCYTES NFR BLD AUTO: 24.6 % (ref 15–45)
MAGNESIUM SERPL-MCNC: 2.1 MG/DL (ref 1.4–2.2)
MCH RBC QN AUTO: 28.1 PG (ref 28–32)
MCHC RBC AUTO-ENTMCNC: 33.8 G/DL (ref 33–36)
MCV RBC AUTO: 83 FL (ref 82–95)
MONOCYTES # BLD AUTO: 0.81 10*3/MM3 (ref 0.19–1.3)
MONOCYTES NFR BLD AUTO: 6.8 % (ref 4–12)
NEUTROPHILS # BLD AUTO: 7.89 10*3/MM3 (ref 1.87–8.4)
NEUTROPHILS NFR BLD AUTO: 66.1 % (ref 39–78)
NRBC BLD MANUAL-RTO: 0 /100 WBC (ref 0–0)
PLATELET # BLD AUTO: 266 10*3/MM3 (ref 130–400)
PMV BLD AUTO: 9.8 FL (ref 6–12)
POTASSIUM BLD-SCNC: 4.5 MMOL/L (ref 3.5–5.3)
PROT SERPL-MCNC: 9 G/DL (ref 6.3–8.7)
RBC # BLD AUTO: 5.95 10*6/MM3 (ref 4.8–5.9)
SODIUM BLD-SCNC: 137 MMOL/L (ref 135–145)
T4 FREE SERPL-MCNC: 1.39 NG/DL (ref 0.78–2.19)
TRIGL SERPL-MCNC: 345 MG/DL (ref 0–149)
TSH SERPL DL<=0.05 MIU/L-ACNC: 3.71 MIU/ML (ref 0.47–4.68)
VIT B12 BLD-MCNC: 314 PG/ML (ref 239–931)
WBC NRBC COR # BLD: 11.93 10*3/MM3 (ref 4.8–10.8)

## 2018-11-19 PROCEDURE — 83735 ASSAY OF MAGNESIUM: CPT | Performed by: NURSE PRACTITIONER

## 2018-11-19 PROCEDURE — 36415 COLL VENOUS BLD VENIPUNCTURE: CPT

## 2018-11-19 PROCEDURE — 84439 ASSAY OF FREE THYROXINE: CPT | Performed by: NURSE PRACTITIONER

## 2018-11-19 PROCEDURE — 82607 VITAMIN B-12: CPT | Performed by: NURSE PRACTITIONER

## 2018-11-19 PROCEDURE — 85651 RBC SED RATE NONAUTOMATED: CPT | Performed by: NURSE PRACTITIONER

## 2018-11-19 PROCEDURE — 80061 LIPID PANEL: CPT | Performed by: NURSE PRACTITIONER

## 2018-11-19 PROCEDURE — 99214 OFFICE O/P EST MOD 30 MIN: CPT | Performed by: NURSE PRACTITIONER

## 2018-11-19 PROCEDURE — 80050 GENERAL HEALTH PANEL: CPT | Performed by: NURSE PRACTITIONER

## 2018-11-19 PROCEDURE — 82746 ASSAY OF FOLIC ACID SERUM: CPT | Performed by: NURSE PRACTITIONER

## 2018-11-19 RX ORDER — PROPRANOLOL HYDROCHLORIDE 10 MG/1
TABLET ORAL
Qty: 30 TABLET | Refills: 3 | Status: SHIPPED | OUTPATIENT
Start: 2018-11-19 | End: 2019-02-14 | Stop reason: SDUPTHER
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
## 2018-11-19 NOTE — PATIENT INSTRUCTIONS
Wear compression stockings, increase water intake and keep a daily record of BP readings sitting and standing. Report any abnormalities to PCP.

## 2018-11-30 ENCOUNTER — TELEPHONE (OUTPATIENT)
Dept: NEUROSURGERY | Facility: CLINIC | Age: 58
End: 2018-11-30

## 2018-11-30 NOTE — TELEPHONE ENCOUNTER
Patient calls stating that he is having increased hip pain. I have contacted the patient back, leaving a voicemail. Explained that Dr. Almaraz does not treat hip pain, and that we recommend he follow up with his PCP for potential referral to Ortho for evaluation.

## 2018-12-12 ENCOUNTER — HOSPITAL ENCOUNTER (OUTPATIENT)
Dept: ULTRASOUND IMAGING | Facility: HOSPITAL | Age: 58
Discharge: HOME | End: 2018-12-12

## 2018-12-12 ENCOUNTER — HOSPITAL ENCOUNTER (OUTPATIENT)
Dept: CARDIOLOGY | Facility: HOSPITAL | Age: 58
Discharge: HOME | End: 2018-12-12
Admitting: NURSE PRACTITIONER

## 2018-12-12 ENCOUNTER — HOSPITAL ENCOUNTER (OUTPATIENT)
Dept: MRI IMAGING | Facility: HOSPITAL | Age: 58
Discharge: HOME | End: 2018-12-12

## 2018-12-12 VITALS
HEIGHT: 69 IN | DIASTOLIC BLOOD PRESSURE: 80 MMHG | BODY MASS INDEX: 27.85 KG/M2 | SYSTOLIC BLOOD PRESSURE: 124 MMHG | WEIGHT: 188.05 LBS

## 2018-12-12 DIAGNOSIS — R25.1 TREMOR: ICD-10-CM

## 2018-12-12 DIAGNOSIS — R51.9 CHRONIC NONINTRACTABLE HEADACHE, UNSPECIFIED HEADACHE TYPE: ICD-10-CM

## 2018-12-12 DIAGNOSIS — R42 DIZZINESS: ICD-10-CM

## 2018-12-12 DIAGNOSIS — R06.02 SHORTNESS OF BREATH: ICD-10-CM

## 2018-12-12 DIAGNOSIS — G89.29 CHRONIC NONINTRACTABLE HEADACHE, UNSPECIFIED HEADACHE TYPE: Primary | ICD-10-CM

## 2018-12-12 DIAGNOSIS — G89.29 CHRONIC NONINTRACTABLE HEADACHE, UNSPECIFIED HEADACHE TYPE: ICD-10-CM

## 2018-12-12 DIAGNOSIS — R51.9 CHRONIC NONINTRACTABLE HEADACHE, UNSPECIFIED HEADACHE TYPE: Primary | ICD-10-CM

## 2018-12-12 LAB
BH CV ECHO MEAS - AO MAX PG (FULL): 3.9 MMHG
BH CV ECHO MEAS - AO MAX PG: 6.7 MMHG
BH CV ECHO MEAS - AO MEAN PG (FULL): 2 MMHG
BH CV ECHO MEAS - AO MEAN PG: 4 MMHG
BH CV ECHO MEAS - AO ROOT AREA: 8.6 CM^2
BH CV ECHO MEAS - AO ROOT DIAM: 3.3 CM
BH CV ECHO MEAS - AO V2 MAX: 129 CM/SEC
BH CV ECHO MEAS - AO V2 MEAN: 100 CM/SEC
BH CV ECHO MEAS - AO V2 VTI: 30.9 CM
BH CV ECHO MEAS - AVA(I,A): 2.5 CM^2
BH CV ECHO MEAS - AVA(I,D): 2.5 CM^2
BH CV ECHO MEAS - AVA(V,A): 2.2 CM^2
BH CV ECHO MEAS - AVA(V,D): 2.2 CM^2
BH CV ECHO MEAS - EDV(CUBED): 93.6 ML
BH CV ECHO MEAS - EDV(MOD-SP4): 34.8 ML
BH CV ECHO MEAS - EDV(TEICH): 94.4 ML
BH CV ECHO MEAS - EF(MOD-SP4): 64.4 %
BH CV ECHO MEAS - ESV(MOD-SP4): 12.4 ML
BH CV ECHO MEAS - IVS/LVPW: 0.91
BH CV ECHO MEAS - IVSD: 1.3 CM
BH CV ECHO MEAS - LA DIMENSION: 3.6 CM
BH CV ECHO MEAS - LA/AO: 1.1
BH CV ECHO MEAS - LAT PEAK E' VEL: 8.7 CM/SEC
BH CV ECHO MEAS - LV MASS(C)D: 238.5 GRAMS
BH CV ECHO MEAS - LV MAX PG: 2.8 MMHG
BH CV ECHO MEAS - LV MEAN PG: 2 MMHG
BH CV ECHO MEAS - LV V1 MAX: 83 CM/SEC
BH CV ECHO MEAS - LV V1 MEAN: 57.6 CM/SEC
BH CV ECHO MEAS - LV V1 VTI: 22 CM
BH CV ECHO MEAS - LVIDD: 4.5 CM
BH CV ECHO MEAS - LVLD AP4: 7 CM
BH CV ECHO MEAS - LVLS AP4: 5.7 CM
BH CV ECHO MEAS - LVOT AREA (M): 3.5 CM^2
BH CV ECHO MEAS - LVOT AREA: 3.5 CM^2
BH CV ECHO MEAS - LVOT DIAM: 2.1 CM
BH CV ECHO MEAS - LVPWD: 1.4 CM
BH CV ECHO MEAS - MED PEAK E' VEL: 6 CM/SEC
BH CV ECHO MEAS - MV A MAX VEL: 49.9 CM/SEC
BH CV ECHO MEAS - MV DEC TIME: 0.32 SEC
BH CV ECHO MEAS - MV E MAX VEL: 60.3 CM/SEC
BH CV ECHO MEAS - MV E/A: 1.2
BH CV ECHO MEAS - SV(AO): 264.3 ML
BH CV ECHO MEAS - SV(LVOT): 76.2 ML
BH CV ECHO MEAS - SV(MOD-SP4): 22.4 ML
BH CV ECHO MEASUREMENTS AVERAGE E/E' RATIO: 8.2
LEFT ATRIUM VOLUME: 29.5 CM3
MAXIMAL PREDICTED HEART RATE: 163 BPM
STRESS TARGET HR: 139 BPM

## 2018-12-12 PROCEDURE — 93880 EXTRACRANIAL BILAT STUDY: CPT

## 2018-12-12 PROCEDURE — 93880 EXTRACRANIAL BILAT STUDY: CPT | Performed by: SURGERY

## 2018-12-12 PROCEDURE — 70553 MRI BRAIN STEM W/O & W/DYE: CPT

## 2018-12-12 PROCEDURE — 93306 TTE W/DOPPLER COMPLETE: CPT

## 2018-12-12 PROCEDURE — 0 GADOBENATE DIMEGLUMINE 529 MG/ML SOLUTION: Performed by: NURSE PRACTITIONER

## 2018-12-12 PROCEDURE — 93306 TTE W/DOPPLER COMPLETE: CPT | Performed by: INTERNAL MEDICINE

## 2018-12-12 PROCEDURE — A9577 INJ MULTIHANCE: HCPCS | Performed by: NURSE PRACTITIONER

## 2018-12-12 RX ADMIN — GADOBENATE DIMEGLUMINE 10 ML: 529 INJECTION, SOLUTION INTRAVENOUS at 12:20

## 2018-12-14 DIAGNOSIS — G93.0 ARACHNOID CYST: Primary | ICD-10-CM

## 2018-12-27 DIAGNOSIS — G89.29 CHRONIC NONINTRACTABLE HEADACHE, UNSPECIFIED HEADACHE TYPE: ICD-10-CM

## 2018-12-27 DIAGNOSIS — R51.9 CHRONIC NONINTRACTABLE HEADACHE, UNSPECIFIED HEADACHE TYPE: ICD-10-CM

## 2019-01-02 DIAGNOSIS — G47.34 NOCTURNAL HYPOXIA: ICD-10-CM

## 2019-01-02 DIAGNOSIS — R51.9 CHRONIC NONINTRACTABLE HEADACHE, UNSPECIFIED HEADACHE TYPE: Primary | ICD-10-CM

## 2019-01-02 DIAGNOSIS — G89.29 CHRONIC NONINTRACTABLE HEADACHE, UNSPECIFIED HEADACHE TYPE: Primary | ICD-10-CM

## 2019-01-03 ENCOUNTER — DOCUMENTATION (OUTPATIENT)
Dept: NEUROSURGERY | Facility: CLINIC | Age: 59
End: 2019-01-03

## 2019-01-03 NOTE — PROGRESS NOTES
Eric Caldera is a 58 y.o. male with a significant comorbidity hypertension and depression. He has a known problem of left L5 radiculopathy status post anterior lumbar interbody fusion and posterior instrumentation with laminectomy. Physical exam findings of persistent burning dysesthesias in the L5 distribution.  His imaging shows good decompression with adequate instrumentation.    During his last examination I noticed that Eric had mild atrophy his left calf muscle.  He also had a sensory deficit in his left L5 distribution with burning pain in the same distribution.  However he had no objective motor weakness.  His reflexes were also intact.    His post operative imaging showed good decompression for both foraminal stenosis or central spinal stenosis at L5/S1.  Post operative radiographs show intact instrumentation.  His EMG and nerve conduction study performed both before and after his surgery showed a stable L5/S1 preganglionic nerve root injury.    Given that his current complaints and physical exam findings are burning pain only without motor weakness I do believe that he would be able to participate in light work as defined as sitting for up to 2 hours per day, standing and walking for 6 hours per day, lifting and carrying 20 pounds for up to one third of a full workday and 10 pounds for up to two thirds of a full workday.    Per the [employer] Medical Association guide to the evaluation of permanent impairment fifth edition page 404, table 15-7 Criteria for rating whole person impairment percent due to specific spinal disorders under category 5 section D he had a single level spinal fusion with or without decompression with residual signs and symptoms of the lumbar spine which results in 12% impairment of the whole person.    Also on page 384 on table 15-3 criteria for rating impairment due to lumbar spine injury, he qualifies for JODI lumbar category 310% to 13% impairment of whole person.  This is  due to significant signs of radiculopathy, such as dermatomal pain and/or in dermatomal distribution, sensory loss, loss of relevant reflexes, loss of muscle strength or measured unilateral atrophy above or below the knee compared to measurements on the contralateral side at the same location; impairment may be verified by electrodiagnostic finding.    Mehran Almaraz MD

## 2019-01-14 DIAGNOSIS — G47.34 NOCTURNAL HYPOXIA: ICD-10-CM

## 2019-01-14 DIAGNOSIS — G89.29 CHRONIC NONINTRACTABLE HEADACHE, UNSPECIFIED HEADACHE TYPE: ICD-10-CM

## 2019-01-14 DIAGNOSIS — R51.9 CHRONIC NONINTRACTABLE HEADACHE, UNSPECIFIED HEADACHE TYPE: ICD-10-CM

## 2019-02-14 ENCOUNTER — OFFICE VISIT (OUTPATIENT)
Dept: NEUROLOGY | Facility: CLINIC | Age: 59
End: 2019-02-14

## 2019-02-14 VITALS
BODY MASS INDEX: 30.66 KG/M2 | WEIGHT: 207 LBS | HEIGHT: 69 IN | RESPIRATION RATE: 18 BRPM | DIASTOLIC BLOOD PRESSURE: 74 MMHG | HEART RATE: 78 BPM | SYSTOLIC BLOOD PRESSURE: 132 MMHG

## 2019-02-14 DIAGNOSIS — G93.0 ARACHNOID CYST: ICD-10-CM

## 2019-02-14 DIAGNOSIS — R25.1 TREMOR: Primary | ICD-10-CM

## 2019-02-14 PROCEDURE — 99213 OFFICE O/P EST LOW 20 MIN: CPT | Performed by: PSYCHIATRY & NEUROLOGY

## 2019-02-14 RX ORDER — PROPRANOLOL HYDROCHLORIDE 10 MG/1
TABLET ORAL
Qty: 30 TABLET | Refills: 5 | Status: SHIPPED | OUTPATIENT
Start: 2019-02-14 | End: 2019-08-15 | Stop reason: SDUPTHER

## 2019-02-14 RX ORDER — ATORVASTATIN CALCIUM 20 MG/1
20 TABLET, FILM COATED ORAL DAILY
COMMUNITY
End: 2020-09-21 | Stop reason: ALTCHOICE

## 2019-02-14 NOTE — PATIENT INSTRUCTIONS
Patient to get with PCP and surgeon about abdominal discomfort status post surgery.  Patient to get with PCP about weight and diet control

## 2019-02-14 NOTE — PROGRESS NOTES
Subjective   Eric Caldera, 1960, is a male who is being seen today for   Chief Complaint   Patient presents with   • Tremors       HISTORY OF PRESENT ILLNESS: Patient seen for tremor.  Patient is stable with tremor on propranolol 10 mg daily which is to be continued.  No significant side effects.  Patient complains of groin pain from his previous back surgery.  Patient also has evidence of possible cerebellar arachnoid cyst which needs to be followed up by neurosurgery.  Patient headaches are not as intense at this time.    REVIEW OF SYSTEMS:   GENERAL: Patient's blood pressure today 132/74 left arm sitting and standing pulse 78.  PULMONARY: No acute respiratory difficulties  CVS: No acute chest pain or palpitation  GASTROINTESTINAL: No acute GI distress  GENITOURINARY: No acute  distress  GYN: Not applicable  MUSCULOSKELETAL: As above  HEENT: No acute vision or hearing change  ENDOCRINE: No acute endocrine symptoms  PSYCHIATRIC: No acute psychiatric symptoms  HEMATOLOGY: No acute anemia  SKIN: No acute skin changes  Family history reviewed and otherwise noncontributory  Social history: Patient denies smoking or drug or alcohol use    PHYSICAL EXAMINATION:    GENERAL: No acute distress except for the left groin pain and tenderness to palpation which is to be followed by PCP and neurosurgery.  No obvious masses or nodes are palpated  CRANIUM: No specific tenderness over the cranium except for mild tenderness to palpation over cervical paraspinals on the right which apparently is a chronic problem/symptom.  Neck otherwise supple  HEENT: No acute fundic abnormalities.  Pupils equal round reactive to light.       EYES: EOMs intact without nystagmus and fields full to confrontation       EARS: Tympanic membranes normal and hears tuning fork bilaterally       THROAT: No oropharynx abnormalities       NECK: No bruit/no lymphadenopathy  CHEST: No acute cardiopulmonary abnormalities by auscultation  ABDOMEN:  Nondistended  EXTREMITIES: Pulses symmetrical.  Patient has minimal intentional tremor.  No cogwheeling or rigidity.  No rest tremor  NEURO: Patient alert and follows commands without difficulty  SPEECH: Normal    CRANIAL NERVES: Motor sensory about the face normal and symmetric    MOTOR STRENGTH: Motor strength upper and lower extremities normal  STATION AND GAIT: Patient favors the left lower extremity because of the discomfort in his area of previous surgery.  Romberg negative.  No festination  CEREBELLAR: Finger-nose and heel shin normal  SENSORY: Pin and vibration upper and lower extremities normal  REFLEXES: Reflexes present and symmetric upper and lower extremities without Babinski's      ASSESSMENT AND PLAN: Patient tremor is stable on the propranolol.Patient's Body mass index is 30.57 kg/m². BMI is above normal parameters. Recommendations include: referral to primary care.      Eric was seen today for tremors.    Diagnoses and all orders for this visit:    Tremor    Arachnoid cyst  -     Ambulatory Referral to Neurosurgery    Other orders  -     propranolol (INDERAL) 10 MG tablet; Take 1 tablet by mouth every night        Dictated utilizing Dragon voice recognition software

## 2019-04-15 ENCOUNTER — OFFICE VISIT (OUTPATIENT)
Dept: NEUROSURGERY | Facility: CLINIC | Age: 59
End: 2019-04-15

## 2019-04-15 VITALS — HEIGHT: 69 IN | BODY MASS INDEX: 30.66 KG/M2 | WEIGHT: 207 LBS

## 2019-04-15 DIAGNOSIS — G93.0 ARACHNOID CYST: Primary | ICD-10-CM

## 2019-04-15 DIAGNOSIS — Z87.891 FORMER TOBACCO USE: ICD-10-CM

## 2019-04-15 DIAGNOSIS — E66.09 CLASS 1 OBESITY DUE TO EXCESS CALORIES WITH SERIOUS COMORBIDITY AND BODY MASS INDEX (BMI) OF 30.0 TO 30.9 IN ADULT: ICD-10-CM

## 2019-04-15 PROCEDURE — 99214 OFFICE O/P EST MOD 30 MIN: CPT | Performed by: NEUROLOGICAL SURGERY

## 2019-04-15 NOTE — PROGRESS NOTES
Chief complaint:   Chief Complaint   Patient presents with   • Follow-up     Found to have abnormal brain scan by neurology, sent here for evaluation.       Subjective     HPI:   From previous note: Eric Caldera is a 58 y.o. male with a significant comorbidity hypertension and depression. He presents with a known problem of left L5 radiculopathy status post A-lif and posterior instrumentation with laminectomy. Physical exam findings of persistent burning dysesthesias in the L5 distribution.  His imaging shows good decompression with adequate instrumentation.     1. Degeneration of lumbar intervertebral disc    2. S/P lumbar fusion    3. Pain of left lower extremity    4. Numbness of left foot    5. BMI 29.0-29.9,adult    6. Former tobacco use          Recommendations:  Mohamud has not made any significant improvement in his burning dysesthesias in the left L5 region.  He still walks with an antalgic gait.  He is walking without a cane or a walker.  I do believe that he has reached maximal medical improvement.  He has had no hardware complications.  Given his back pain is minimal I see no reason for further imaging.  We will give him a referral for pain management.  Surgery is not indicated.      Interval History: Eric is well-known to our clinic after undergoing anterior lumbar interbody fusion followed by L5 laminectomy and posterior instrumentation.  This left him with significant left lower extremity L5 distribution pain.  Regarding his back pain and left radicular pain.  This is been consistent.  He has had no improvements or changes in his symptoms.  He still denies numbness and tingling.  He still has full motor strength in his left lower extremity.  He has no bowel or bladder insufficiency.  He does have a frequent color changes in his left lower extremity and atrophy of his calf muscle.  He does not have any associated swelling.    He was referred to neurology for further evaluation.  They performed  "an MRI which showed an arachnoid cyst and he has been referred back for further evaluation.  He does have some right-sided occipital headaches.  These are intermittent in nature.  They improve with ibuprofen and Tylenol.  He has no evidence of ataxia, nausea, vomiting, or vision changes.    Review of Systems   Eyes: Negative.    Respiratory: Negative.    Cardiovascular: Negative.    Gastrointestinal: Negative.    Endocrine: Negative.    Genitourinary: Negative.    Musculoskeletal: Positive for back pain, gait problem and myalgias.   Skin: Negative.    Allergic/Immunologic: Negative.    Neurological: Positive for weakness, numbness and headaches.   Hematological: Negative.    Psychiatric/Behavioral: Negative.        PFSH:  Past Medical History:   Diagnosis Date   • Arthritis    • Depression    • Hypertension    • Kidney stones    • Migraine    • Thyroid disease        Past Surgical History:   Procedure Laterality Date   • ANTERIOR LUMBAR EXPOSURE N/A 3/30/2018    Procedure: ANTERIOR LUMBAR EXPOSURE;  Surgeon: Samm Robb DO;  Location:  [facility] OR;  Service: Vascular   • ARM TENDON REPAIR Left 2011    \"Rebuilding\" of tendons   • LEG SURGERY Right 2009    Removal of bone tumor   • LUMBAR LAMINECTOMY ANTERIOR LUMBAR INTERBODY FUSION Left 3/30/2018    Procedure: LUMBAR LAMINECTOMY ANTERIOR LUMBAR INTERBODY FUSION, Lumbar 5 to sacral 1 anterior interbody fusion and then posterior MIS L5 to S1 left decompression and instrumentation. O-arm;  Surgeon: Mehran Almaraz MD;  Location: [facility];  Service: Neurosurgery   • LUMBAR SPINE SURGERY  2005       Objective      Current Outpatient Medications   Medication Sig Dispense Refill   • atorvastatin (LIPITOR) 10 MG tablet Take 10 mg by mouth Daily.     • hydrOXYzine (ATARAX) 25 MG tablet Take 25 mg by mouth 3 (Three) Times a Day As Needed for Itching.     • levothyroxine (SYNTHROID, LEVOTHROID) 75 MCG tablet Take 100 mcg by mouth Daily.     • propranolol (INDERAL) " "10 MG tablet Take 1 tablet by mouth every night 30 tablet 5   • sertraline (ZOLOFT) 50 MG tablet Take 150 mg by mouth Daily.       No current facility-administered medications for this visit.        Vital Signs  Ht 175.3 cm (69\")   Wt 93.9 kg (207 lb)   BMI 30.57 kg/m²   Physical Exam   Constitutional: He is oriented to person, place, and time.   Eyes: EOM are normal. Pupils are equal, round, and reactive to light.   Neurological: He is oriented to person, place, and time. He has normal strength. Gait normal.   Psychiatric: His speech is normal.     Neurologic Exam     Mental Status   Oriented to person, place, and time.   Speech: speech is normal     Cranial Nerves     CN II   Visual fields full to confrontation.     CN III, IV, VI   Pupils are equal, round, and reactive to light.  Extraocular motions are normal.     CN V   Right facial sensation deficit: none  Left facial sensation deficit: none    CN VII   Facial expression full, symmetric.     CN VIII   Hearing: intact    CN IX, X   Palate: symmetric    CN XI   Right sternocleidomastoid strength: normal  Left sternocleidomastoid strength: normal    CN XII   Tongue deviation: none    Motor Exam     Strength   Strength 5/5 throughout.     Sensory Exam   Right arm light touch: normal  Left arm light touch: normal  Right leg light touch: normal  Left leg light touch: normal  Right arm proprioception: normal  Left arm proprioception: normal  Right leg proprioception: normal  Left leg proprioception: normal  Right arm pinprick: normal  Left arm pinprick: normal  Right leg pinprick: normal  Left leg pinprick: normal  Burning dysesthesias in the left lower extremity in an L5 distribution.     Gait, Coordination, and Reflexes     Gait  Gait: normal    Reflexes   Reflexes 2+ except as noted.     (12 bullet pts)    Results Review:   EXAMINATION: MRI BRAIN WITHOUT AND WITH CONTRAST- 12/12/2018 12:01 PM  CST    HISTORY: R25.1-Tremor, unspecified; R51-Headache; " R42-Dizziness and  giddiness    TECHNIQUE: Multiplanar imaging was performed in a high field magnet  before and after gadolinium contrast administration.    COMPARISON: No comparison study.    FINDINGS: There is no evidence of acute infarct on the  diffusion-weighted sequence. There is a CSF collection posterior to the  left cerebellar hemisphere measuring 6 cm transversely and about 1 cm in  short axis diameter that has some mild mass effect on the cerebellar  hemisphere. This is probably an arachnoid cyst. There are a few  scattered areas of T2 high signal within the periventricular and  subcortical white matter in the cerebral hemispheres. There are no other  mass lesions. The ventricular system is nondilated. No other structural  abnormality is appreciated.   Report Conclusions  IMPRESSION:   1. CSF signal intensity posterior to the left cerebellar hemisphere most  likely represents an arachnoid cyst and measures about 6 cm transversely  and 1 cm in short axis diameter.  2. Minimal atrophy.  3. There are a few scattered areas of T2 high signal within the  hemispheric white matter that are nonspecific and most likely due to  chronic small vessel disease.    This report was finalized on 12/12/2018 14:17 by Dr. Андрей Gil MD.         Assessment/Plan: Eric Caldera is a 58 y.o. male with a significant comorbidity hypertension and depression, and left L5 radiculopathy after ALIF and posterior laminectomy and instrumentation. He presents with a new problem of incidentally found left cerebellar arachnoid cyst. Physical exam findings of neurologically intact.  His imaging shows small left convexity cerebellar arachnoid cyst.    Intracranial Arachnoid Cyst  Eric's imaging is most consistent with benign arachnoid cyst.  These are most often a congenital anomaly.  They are most often found in the middle fossa (49%) or cerebellopontine angle (11%) but can be found in the suprasellar region and posterior fossa.   The vast majority these are asymptomatic and incidental findings in a workup for headache.  Imaging often shows remodeling of the overlying bone.  Recommendations for incidentally discovered arachnoid cyst in adults is a single follow-up imaging at 6-8 months.  This is usually adequate to rule out increase in size.  Subsequent studies are generally only ordered if concerning symptoms develop.    At this time I feel that Eric's arachnoid cyst is asymptomatic.  I would favor conservative management with repeat MRI brain with and without contrast in 6-8 months.  I will call him with results of his repeat MRI.  No need to follow-up in clinic.    Unilateral (left) Sciatica/complex regional pain syndrome:    Defined as pain in the distribution of the sciatic nerve comprising L4 to S3.      Different diagnosis includes ...    Degenerative spine: lumbar stenosis resulting in neurogenic claudication, spondylolisthesis resulting in foraminal stenosis, herniated lumbar disc with radiculopathy, or synovial cyst  Neurogenic: complex regional pain syndrome, common peroneal nerve entrapment  MSK: IT band syndrome, trochanteric bursitis, sacroiliitis, Piriformis syndrome.  Psychological: complex regional pain syndrome, myofascial pain, conversion disorder, or malingering  Much less likely: Peripheral nerve entrapment (common peroneal), infectious discitis, osteomyelitis, herpes zoster/shingles, Meningeal cyst, conjoined nerve root, spinal tumor, metastasis, multiple myeloma, peripheral nerve root sheath tumor.    Eric's signs and symptoms are most concerning for complex regional pain syndrome of the left lower extremity given his synptoms of pain with history of prior lumbar fusion and subsequent imaging showing good decompression and signs of intact sensation and motor strength with atrophy of his left calf and color change.     Complex regional pain syndrome: Chronic pain of usually a single arm or leg associated with  alodynia, dysesthesia, changes in skin temperature, changes in skin color, and muscle spasms.  Type I, also known as reflex sympathetic dystrophy syndrome or RDS, typically occurs after illness, surgery, or injury that affected the limb but did not directly damage and nerve.  Type II, referred to as causalgia, has similar symptoms but follows a distinct nerve injury.    I will defer care to neurology.  Mohamud is previously tried a course of extensive gabapentin without improvement.  Could potentially try Lyrica.    Obesity Counseling  We discussed Eric's current weight and the effect on his spine, including premature dis degeneration and increased anterior force on the lumbar spine resulting in worsening facet arthropathy.  Eric has a BMI 30.0-34.9        Classification: class I obesity.  We spent 1 minutes in weight management counseling including discussing current weight, current diet, and exercise patterns.  Additional we set goals for weight reduction.  Therefore above normal parameters. Recommendations include: educational material.       1. Arachnoid cyst    2. Former tobacco use    3. Class 1 obesity due to excess calories with serious comorbidity and body mass index (BMI) of 30.0 to 30.9 in adult        Recommendations:  Eric was seen today for follow-up.    Diagnoses and all orders for this visit:    Arachnoid cyst  -     MRI Brain Without Contrast; Future    Former tobacco use    Class 1 obesity due to excess calories with serious comorbidity and body mass index (BMI) of 30.0 to 30.9 in adult        Return if symptoms worsen or fail to improve.    Level of Risk: Moderate due to: undiagnosed new problem  MDM: Moderate Complexity  (Mod = 11381, High = 63661)    Thank you, for allowing me to continue to participate in the care of this patient.    Sincerely,  Mehran Almaraz MD

## 2019-04-24 ENCOUNTER — HOSPITAL ENCOUNTER (OUTPATIENT)
Dept: SLEEP MEDICINE | Facility: HOSPITAL | Age: 59
Discharge: HOME | End: 2019-04-24
Admitting: NURSE PRACTITIONER

## 2019-04-24 VITALS
HEART RATE: 94 BPM | SYSTOLIC BLOOD PRESSURE: 113 MMHG | OXYGEN SATURATION: 96 % | BODY MASS INDEX: 27.85 KG/M2 | RESPIRATION RATE: 18 BRPM | HEIGHT: 69 IN | DIASTOLIC BLOOD PRESSURE: 90 MMHG | WEIGHT: 188 LBS

## 2019-04-24 DIAGNOSIS — G89.29 CHRONIC NONINTRACTABLE HEADACHE, UNSPECIFIED HEADACHE TYPE: ICD-10-CM

## 2019-04-24 DIAGNOSIS — G47.34 NOCTURNAL HYPOXIA: ICD-10-CM

## 2019-04-24 DIAGNOSIS — R51.9 CHRONIC NONINTRACTABLE HEADACHE, UNSPECIFIED HEADACHE TYPE: ICD-10-CM

## 2019-04-24 PROCEDURE — 95810 POLYSOM 6/> YRS 4/> PARAM: CPT | Performed by: PSYCHIATRY & NEUROLOGY

## 2019-04-24 PROCEDURE — 95810 POLYSOM 6/> YRS 4/> PARAM: CPT

## 2019-04-29 ENCOUNTER — TELEPHONE (OUTPATIENT)
Dept: NEUROLOGY | Facility: CLINIC | Age: 59
End: 2019-04-29

## 2019-04-29 NOTE — TELEPHONE ENCOUNTER
Sara from [employer] called.  She stated she has tried multiple times to reach Mr. Perez and has not been able to make contact.    I had a call from Mr. Perez earlier today but did not see a note in chart under encounters and asked around but no one said they had called.      He stated if he needed to call back to have that person call him.    I explained this to Sara from [employer] and she stated that there might still be a need to have a welfare check on patient.    Thanks,    Gavino LEI

## 2019-05-06 ENCOUNTER — OFFICE VISIT (OUTPATIENT)
Dept: NEUROLOGY | Facility: CLINIC | Age: 59
End: 2019-05-06

## 2019-05-06 VITALS
SYSTOLIC BLOOD PRESSURE: 118 MMHG | DIASTOLIC BLOOD PRESSURE: 62 MMHG | BODY MASS INDEX: 29.33 KG/M2 | RESPIRATION RATE: 18 BRPM | WEIGHT: 198 LBS | HEIGHT: 69 IN | HEART RATE: 88 BPM

## 2019-05-06 DIAGNOSIS — M79.2 NEURALGIA: Primary | ICD-10-CM

## 2019-05-06 PROCEDURE — 99213 OFFICE O/P EST LOW 20 MIN: CPT | Performed by: PSYCHIATRY & NEUROLOGY

## 2019-05-06 NOTE — PROGRESS NOTES
"Kevin Caldera, 1960, is a male who is being seen today for   Chief Complaint   Patient presents with   • Headache   • Tremors   • Sleep Apnea     on oxygen       HISTORY OF PRESENT ILLNESS: Patient seen for complaints of occipital pain.  Patient has had this increased since his EEG.  Patient also has a history of BASSEM awaiting titration is on oxygen now.  Patient answer the question of the questionnaire possible self-harm but in review of that he does not have any suicidal tendencies he just \"tired of the pain\".  Patient has chronic pain in his left groin left back and leg which is followed by neurosurgery and  for pain management.  Patient today complains of occipital type pain rating to the left frontal temporal area and sometimes to the right.  REVIEW OF SYSTEMS:   GENERAL: Patient's blood pressure 118/62 left arm seated in same standing pulse 88     PULMONARY as above: No acute respiratory distress  CVS: No acute chest pain or palpitation  GASTROINTESTINAL: No acute GI distress  GENITOURINARY: No acute  distress   GYN: Not applicable  MUSCULOSKELETAL: As above  HEENT: No acute vision or hearing change/ patient has had some ear infection on the right  ENDOCRINE: No acute endocrine symptoms  PSYCHIATRIC: No acute psychiatric symptoms  HEMATOLOGY: No anemia  SKIN: No acute skin changes other than as noted above  Family history reviewed and otherwise noncontributory  Social history: Patient denies smoking or drug or alcohol use    PHYSICAL EXAMINATION:    GENERAL: Some tenderness to palpation over the greater occipital nerve on the left greater than right with some mild tenderness to palpation over the cranium in general.  Patient's intentional tremor unchanged  CRANIUM: Normocephalic/atraumatic  HEENT: No acute fundic abnormalities.  Pupils equal round reactive to light.       EYES: EOMs intact without nystagmus and fields full to confrontation       EARS: Tympanic membrane normal " on left.  Patient's tympanic membrane on the right obscured by wax and hears tuning fork bilaterally       THROAT: No oropharynx abnormalities       NECK: No bruit/no lymphadenopathy  CHEST: No acute cardiopulmonary abnormalities by auscultation  ABDOMEN: Nondistended  EXTREMITIES: Pulses symmetrical  NEURO: Patient alert and follows commands without difficulty  SPEECH: Normal    CRANIAL NERVES: Motor or sensory about the face normal and symmetric  MOTOR STRENGTH: Motor strength upper and lower extremities normal  STATION AND GAIT: Gait favors the left lower extremity because of pain/Romberg negative  CEREBELLAR: Finger-nose and heel shin normal  SENSORY: Pin and vibration upper and lower extremities normal  REFLEXES: Reflexes present symmetric upper lower extremity without Babinski's or clonus      ASSESSMENT AND PLAN: Patient with possible greater occipital neuralgia and getting evaluation by pain management.  Patient has stable tremor.  Patient is to continue propranolol the same. Patient's Body mass index is 29.24 kg/m². BMI is above normal parameters. Recommendations include: referral to primary care.      Eric was seen today for headache, tremors and sleep apnea.    Diagnoses and all orders for this visit:    Neuralgia    Other orders  -     Ambulatory Referral to Pain Management        Dictated utilizing Dragon voice recognition software

## 2019-05-08 ENCOUNTER — TELEPHONE (OUTPATIENT)
Dept: NEUROLOGY | Facility: CLINIC | Age: 59
End: 2019-05-08

## 2019-05-08 ENCOUNTER — TRANSCRIBE ORDERS (OUTPATIENT)
Dept: SLEEP MEDICINE | Facility: HOSPITAL | Age: 59
End: 2019-05-08

## 2019-05-08 DIAGNOSIS — G47.33 OBSTRUCTIVE SLEEP APNEA, ADULT: Primary | ICD-10-CM

## 2019-05-08 RX ORDER — METAXALONE 800 MG/1
TABLET ORAL
Qty: 20 TABLET | Refills: 0 | Status: SHIPPED | OUTPATIENT
Start: 2019-05-08 | End: 2019-07-09 | Stop reason: ALTCHOICE

## 2019-05-08 NOTE — TELEPHONE ENCOUNTER
----- Message from Roderick Kearney MD sent at 5/8/2019  9:56 AM CDT -----  I put in the prescription for the Skelaxin to patient's pharmacy every 12 hours as necessary for the headache or muscle spasm.  Patient to watch to make sure it does not increase drowsiness or wooziness with his driving.  ----- Message -----  From: Little Ace LPN  Sent: 5/7/2019   4:35 PM  To: Roderick Kearney MD    Noe said he would like to try Skelaxin.  ----- Message -----  From: Roderick Kearney MD  Sent: 5/7/2019   2:30 PM  To: Little Ace LPN    Patient cannot take NSAIDs because of allergy.  We might consider muscle relaxant such as Skelaxin.  Pain management will want to proceed with treatment with pain meds most likely.  If patient wants Skelaxin we can get that for him until he sees pain management  ----- Message -----  From: Little Ace LPN  Sent: 5/7/2019   2:08 PM  To: Roderick Kearney MD    Eric said for the last 2 weeks he wakes up with the back of his head pounding.  The headache lasts all day and he isn't able to sleep much.  He had been to ER and his doctor and can't seem to get any help.  He doesn't know how long it will take to get an appointment at Pain Management and would like something to help relieve the headache.

## 2019-05-08 NOTE — TELEPHONE ENCOUNTER
Eric notified.  He said he checked with the pharmacy and the script will cost $74.83.  He is going to try to borrow the money.  He will call back if he is unable to borrow the money.

## 2019-06-12 ENCOUNTER — HOSPITAL ENCOUNTER (OUTPATIENT)
Dept: MRI IMAGING | Facility: HOSPITAL | Age: 59
Discharge: HOME | End: 2019-06-12
Admitting: NEUROLOGICAL SURGERY

## 2019-06-12 DIAGNOSIS — G93.0 ARACHNOID CYST: ICD-10-CM

## 2019-06-12 PROCEDURE — 70551 MRI BRAIN STEM W/O DYE: CPT

## 2019-06-17 ENCOUNTER — HOSPITAL ENCOUNTER (OUTPATIENT)
Dept: SLEEP MEDICINE | Facility: HOSPITAL | Age: 59
Discharge: HOME | End: 2019-06-17
Admitting: PSYCHIATRY & NEUROLOGY

## 2019-06-17 VITALS
OXYGEN SATURATION: 98 % | WEIGHT: 195 LBS | HEART RATE: 61 BPM | BODY MASS INDEX: 27.92 KG/M2 | DIASTOLIC BLOOD PRESSURE: 80 MMHG | HEIGHT: 70 IN | SYSTOLIC BLOOD PRESSURE: 122 MMHG

## 2019-06-17 DIAGNOSIS — G47.33 OBSTRUCTIVE SLEEP APNEA, ADULT: ICD-10-CM

## 2019-06-17 PROCEDURE — 95811 POLYSOM 6/>YRS CPAP 4/> PARM: CPT

## 2019-06-17 PROCEDURE — 95811 POLYSOM 6/>YRS CPAP 4/> PARM: CPT | Performed by: PSYCHIATRY & NEUROLOGY

## 2019-07-09 ENCOUNTER — OFFICE VISIT (OUTPATIENT)
Dept: NEUROLOGY | Facility: CLINIC | Age: 59
End: 2019-07-09

## 2019-07-09 VITALS
RESPIRATION RATE: 18 BRPM | WEIGHT: 193 LBS | HEART RATE: 70 BPM | HEIGHT: 70 IN | SYSTOLIC BLOOD PRESSURE: 118 MMHG | DIASTOLIC BLOOD PRESSURE: 70 MMHG | BODY MASS INDEX: 27.63 KG/M2

## 2019-07-09 DIAGNOSIS — G47.31 COMPLEX SLEEP APNEA SYNDROME: Primary | ICD-10-CM

## 2019-07-09 DIAGNOSIS — G47.34 NOCTURNAL HYPOXIA: ICD-10-CM

## 2019-07-09 DIAGNOSIS — Z87.891 FORMER TOBACCO USE: ICD-10-CM

## 2019-07-09 PROCEDURE — 99213 OFFICE O/P EST LOW 20 MIN: CPT | Performed by: NURSE PRACTITIONER

## 2019-07-09 RX ORDER — PHENOL 1.4 %
AEROSOL, SPRAY (ML) MUCOUS MEMBRANE NIGHTLY
COMMUNITY
End: 2020-09-21 | Stop reason: ALTCHOICE

## 2019-07-09 RX ORDER — OMEGA-3-ACID ETHYL ESTERS 1 G/1
1 CAPSULE, LIQUID FILLED ORAL 4 TIMES DAILY
COMMUNITY
End: 2022-11-01

## 2019-07-09 NOTE — PROGRESS NOTES
Subjective     Chief Complaint   Patient presents with   • Sleep Apnea       Eric Saldana is a 58 y.o. male who presents today for follow-up of sleep apnea.    History of Present Illness  Mr. saldana is a pleasant 58-year-old male who presents today for his first face-to-face visit after being diagnosed with sleep apnea.He states that he only wore his machine 2 times. He has a full face mask and uses [employer] for DME supplies.   He states that he had difficulty tolerating his pressures as well as the headgear with his mask.  He did not contact his DME company in regards to these issues. He was placed on nocturnal oxygen prior to BIPAP titration but is no longer using this.  Oxygen was not prescribed with BiPAP therapy.  He is not sleeping well. He states his medications for depression have been changed and he is no longer taking clonazepam. He is taking Melatonin at night time. He is willing to give this another BiPAP another try.  His original polysomnography testing was April, 2019.  His AHI at that time was 19.4.  He did note that he had weakness or drowsiness with highly emotional events and sleep paralysis.  He was titrated on June, 2019.  He was diagnosed with complex sleep apnea.  He was set at BiPAP ST 16/10 cm H2O with a back-up rate of 10.  He did have some periodic leg movements noted.  He denies use of tobacco, illicit drugs, or alcohol. He is a former smoker and quite several months ago.   mallampati class III anatomy, tonsils removed.  Follow-up sleep questionnaire reviewed in office.  He does state that he has mild issues with mask coming off phase, air leaking with mask, putting on mask, dry mouth or throat with therapy.  He continues to feel fatigued.  His [place] score is 16 in the office.  He denies difficulty falling asleep while driving.    Compliance report reviewed in office today.  He has worn his machine 1 day since having it for 1 hour and 13 minutes.  He is set up BiPAP 16/10 cm H2O  "with a back-up rate of 10.  His AHI on that therapy was 0.8.    Current Outpatient Medications   Medication Sig Dispense Refill   • Acetaminophen 500 MG coapsule Take  by mouth. Takes 3000mg a day     • atorvastatin (LIPITOR) 20 MG tablet Take 20 mg by mouth Daily.     • hydrOXYzine (ATARAX) 25 MG tablet Take 25 mg by mouth 3 (Three) Times a Day As Needed for Itching.     • levothyroxine (SYNTHROID, LEVOTHROID) 75 MCG tablet Take 100 mcg by mouth Daily. Take 125mcg daily     • Melatonin 10 MG tablet Take  by mouth Every Night.     • omega-3 acid ethyl esters (LOVAZA) 1 g capsule Take 1 g by mouth 4 (Four) Times a Day.     • propranolol (INDERAL) 10 MG tablet Take 1 tablet by mouth every night 30 tablet 5   • sertraline (ZOLOFT) 50 MG tablet Take 150 mg by mouth Daily.       No current facility-administered medications for this visit.        Past Medical History:   Diagnosis Date   • Arthritis    • Complex regional pain syndrome i of left lower limb    • Depression    • Hypertension    • Kidney stones    • Migraine    • Thyroid disease        Past Surgical History:   Procedure Laterality Date   • ANTERIOR LUMBAR EXPOSURE N/A 3/30/2018    Procedure: ANTERIOR LUMBAR EXPOSURE;  Surgeon: Samm Robb DO;  Location:  [facility] OR;  Service: Vascular   • ARM TENDON REPAIR Left 2011    \"Rebuilding\" of tendons   • LEG SURGERY Right 2009    Removal of bone tumor   • LUMBAR LAMINECTOMY ANTERIOR LUMBAR INTERBODY FUSION Left 3/30/2018    Procedure: LUMBAR LAMINECTOMY ANTERIOR LUMBAR INTERBODY FUSION, Lumbar 5 to sacral 1 anterior interbody fusion and then posterior MIS L5 to S1 left decompression and instrumentation. O-arm;  Surgeon: Mehran Almaraz MD;  Location: [facility] OR;  Service: Neurosurgery   • LUMBAR SPINE SURGERY  2005       family history includes Cancer in his father; No Known Problems in his mother.    Social History     Tobacco Use   • Smoking status: Former Smoker     Packs/day: 0.25     Years: 20.00     " "Pack years: 5.00     Types: Cigarettes     Last attempt to quit: 2018     Years since quittin.3   • Smokeless tobacco: Never Used   Substance Use Topics   • Alcohol use: No   • Drug use: No       Review of Systems   Constitutional: Positive for fatigue.   HENT: Negative.    Eyes: Negative.    Respiratory: Positive for apnea.    Cardiovascular: Negative.  Negative for chest pain and palpitations.   Gastrointestinal: Negative.    Endocrine: Negative.    Genitourinary: Negative.    Musculoskeletal: Negative.    Skin: Negative.    Allergic/Immunologic: Negative.    Neurological: Positive for headache.   Hematological: Negative.    Psychiatric/Behavioral: Negative.  Positive for depressed mood.   All other systems reviewed and are negative.      Objective     /70 (BP Location: Left arm, Patient Position: Sitting)   Pulse 70   Resp 18   Ht 177.8 cm (70\")   Wt 87.5 kg (193 lb)   BMI 27.69 kg/m² , Body mass index is 27.69 kg/m².    Physical Exam   Constitutional: He is oriented to person, place, and time. He appears well-developed and well-nourished.   HENT:   Head: Normocephalic and atraumatic.   Mallampati class III anatomy, tonsillectomy   Eyes: EOM are normal. Pupils are equal, round, and reactive to light.   Neck: Normal range of motion. Neck supple.   Cardiovascular: Normal rate.   Pulmonary/Chest: Effort normal and breath sounds normal.   Abdominal: Soft.   Musculoskeletal: Normal range of motion.   Neurological: He is alert and oriented to person, place, and time.   Skin: Skin is warm and dry.   Psychiatric: He has a normal mood and affect. His behavior is normal.         ASSESSMENT/PLAN    Eric was seen today for sleep apnea.    Diagnoses and all orders for this visit:    Complex sleep apnea syndrome    Former tobacco use          Allergies and all known medications/prescriptions have been reviewed using resources available on this encounter.    Patient's Body mass index is 27.69 kg/m². BMI " is above normal parameters. Recommendations include: referral to primary care.    Return in about 4 weeks (around 2019).    MEDICAL DECISION MAKIN.  Compliance report reviewed in office today.  There is only 1 hour of data noted of usage with BiPAP machine.  Patient is currently not compliant per insurance guidelines.  We did talk about risk of untreated sleep apnea.  Patient was counseled on possible effects as noted below.  He states he is willing to retry therapy.  He will stop by [facility] and discussed the issues that he is having with his mask as that does seem to be the issue with his noncompliance at this time.  His AHI was well-controlled on BiPAP setting currently.  He did note that he had weakness or drowsiness with highly emotional events.  I would like for him to become stable on BiPAP therapy [facility].  He denies fatigue while driving.  I would also like to perform an overnight pulse oximetry once he is stable on his machine.  He did have nocturnal oxygen prior to his BiPAP titration but oxygen with Pap therapy was not warranted.  I will order an overnight pulse oximetry to be repeated on BiPAP therapy and room air.  2. Counseled on multimodal approach to treatment of sleep apnea to include but not limited to diet, exercise, sleep hygiene, compliance with pap therapy. Encouraged lateral sleeping position and to avoid sedatives or alcohol close to bedtime. Risks of untreated sleep apnea were discussed to include but not limited to HTN, heart disease, stroke, cardiac arrhythmia such as AFIB, and dementia.   3.  Keep follow-up appointment with Dr. Kearney for neurological issues.  4.  Former smoker.  Patient was congratulated on this in the office today.  5.  Continue to follow with psychiatry/psychology for mental health issues.  He is currently stable in regard to this.  6.  BP to be controlled per PCP.      Coco Rizvi, APRN

## 2019-07-09 NOTE — PATIENT INSTRUCTIONS
Drowsy and Distracted Driving Information  Drowsy driving is when you feel sleepy and tired while driving. This makes you less alert, slower to react, and more likely to make bad decisions while driving. Falling asleep at the wheel can cause you to drift off the road at high speed, and it puts you, your passengers, people in other vehicles, and pedestrians in great danger.  Distracted driving is when you do another activity while driving, which can cause you to lose focus on driving safely. Distractions such as eating, drinking, talking on the phone, or texting may cause you to take your eyes off the road or your hands of the wheel.  What lifestyle changes can be made to avoid drowsy driving?  · Get 7?8 hours of sleep every night. If you have trouble sleeping, see your health care provider to determine whether you may have a sleep disorder, such as insomnia or sleep apnea.  · Pay attention to warning signs that you are drowsy. These include yawning, drifting out of your virgilio, missing an exit, or hitting the rumble strip on the side of the road.  · Do not drink alcohol or take a drug or medicine that makes you drowsy before driving.  ? Read medicine labels and talk with your health care provider to find out whether medicines that you take may make you drowsy.  · Try not to drive alone at night or in the late afternoon. Drowsy driving is common among people who drive alone, and it happens most often in the late afternoon and night.  · Drink 1?2 cups of coffee or have an energy drink before driving, if you are drowsy. This may reduce drowsiness, but only for a short time.  · If you become drowsy while driving, pull over to a safe rest area and nap for at least 20 minutes.  What lifestyle changes can be made to avoid distracted driving?  · Do not use your phone to text, e-mail, or talk while driving. Many states have laws against cell phone use and texting while driving.  ? If you must use your phone, use a hands-free  device and set up voice commands. You may be able to connect your phone to your car system.  · Do not program your navigation system while driving. If you use a navigation system, get it ready before you start driving.  · Do not eat or drink while driving.  · Do not drink alcohol before driving, and do not allow anyone to drink alcohol while in your car.  · Take a driving safety course, especially if you are a young . Distracted driving is most common among young drivers.  · If you have passengers in your car, make sure that they let you focus on driving. Distracted driving often happens when there are several people in the car, especially if some people have been drinking.  Why are these changes important?  Distracted driving and drowsy driving are dangerous for you, other people in your car, other people on the road, and pedestrians. Thousands of accidents each year result from distracted and drowsy driving, and many of these accidents are fatal. Making these simple lifestyle changes can:  · Help make you a safer .  · Help prevent motor vehicle accidents, injuries, and deaths.    What can happen if changes are not made?  If you do not make these changes, you will have an increased risk for an accident. You may injure yourself or others. Injury may result in hospitalization, missing work, long-term disability, or even death. If you use your phone while driving, you may be breaking the law and may face legal consequences.  Where to find more information  · National Highway Traffic Safety Administration: www.nhtsa.gov/Driving-Safety/Drowsy-Driving/Research-on-Drowsy-Driving  · National Highway Traffic Safety Administration & [place]. Department of Transportation: www.distraction.gov  Summary  · Drowsy driving and distracted driving cause thousands of accidents each year. Many of these accidents are fatal.  · Drowsy driving is most common among people who do not get enough sleep, and it happens most often  after midnight and in the late afternoon.  · Distracted driving is most common among young drivers.  · Pay attention to warning signs like yawning, drifting out of your virgilio, missing an exit, or hitting the rumble strip on the side of the road.  · Do not eat, drink, text, e-mail, or talk on a phone while driving.  This information is not intended to replace advice given to you by your health care provider. Make sure you discuss any questions you have with your health care provider.  Document Released: 08/31/2017 Document Revised: 08/31/2017 Document Reviewed: 08/31/2017  [employer] Patient Education © 2019 [employer]  Sleep Apnea  Sleep apnea is a condition in which breathing pauses or becomes shallow during sleep. Episodes of sleep apnea usually last 10 seconds or longer, and they may occur as many as 20 times an hour. Sleep apnea disrupts your sleep and keeps your body from getting the rest that it needs. This condition can increase your risk of certain health problems, including:  · Heart attack.  · Stroke.  · Obesity.  · Diabetes.  · Heart failure.  · Irregular heartbeat.    There are three kinds of sleep apnea:  · Obstructive sleep apnea. This kind is caused by a blocked or collapsed airway.  · Central sleep apnea. This kind happens when the part of the brain that controls breathing does not send the correct signals to the muscles that control breathing.  · Mixed sleep apnea. This is a combination of obstructive and central sleep apnea.    What are the causes?  The most common cause of this condition is a collapsed or blocked airway. An airway can collapse or become blocked if:  · Your throat muscles are abnormally relaxed.  · Your tongue and tonsils are larger than normal.  · You are overweight.  · Your airway is smaller than normal.    What increases the risk?  This condition is more likely to develop in people who:  · Are overweight.  · Smoke.  · Have a smaller than normal airway.  · Are  elderly.  · Are male.  · Drink alcohol.  · Take sedatives or tranquilizers.  · Have a family history of sleep apnea.    What are the signs or symptoms?  Symptoms of this condition include:  · Trouble staying asleep.  · Daytime sleepiness and tiredness.  · Irritability.  · Loud snoring.  · Morning headaches.  · Trouble concentrating.  · Forgetfulness.  · Decreased interest in sex.  · Unexplained sleepiness.  · Mood swings.  · Personality changes.  · Feelings of depression.  · Waking up often during the night to urinate.  · Dry mouth.  · Sore throat.    How is this diagnosed?  This condition may be diagnosed with:  · A medical history.  · A physical exam.  · A series of tests that are done while you are sleeping (sleep study). These tests are usually done in a sleep lab, but they may also be done at home.    How is this treated?  Treatment for this condition aims to restore normal breathing and to ease symptoms during sleep. It may involve managing health issues that can affect breathing, such as high blood pressure or obesity. Treatment may include:  · Sleeping on your side.  · Using a decongestant if you have nasal congestion.  · Avoiding the use of depressants, including alcohol, sedatives, and narcotics.  · Losing weight if you are overweight.  · Making changes to your diet.  · Quitting smoking.  · Using a device to open your airway while you sleep, such as:  ? An oral appliance. This is a custom-made mouthpiece that shifts your lower jaw forward.  ? A continuous positive airway pressure (CPAP) device. This device delivers oxygen to your airway through a mask.  ? A nasal expiratory positive airway pressure (EPAP) device. This device has valves that you put into each nostril.  ? A bi-level positive airway pressure (BPAP) device. This device delivers oxygen to your airway through a mask.  · Surgery if other treatments do not work. During surgery, excess tissue is removed to create a wider airway.    It is important  to get treatment for sleep apnea. Without treatment, this condition can lead to:  · High blood pressure.  · Coronary artery disease.  · (Men) An inability to achieve or maintain an erection (impotence).  · Reduced thinking abilities.    Follow these instructions at home:  · Make any lifestyle changes that your health care provider recommends.  · Eat a healthy, well-balanced diet.  · Take over-the-counter and prescription medicines only as told by your health care provider.  · Avoid using depressants, including alcohol, sedatives, and narcotics.  · Take steps to lose weight if you are overweight.  · If you were given a device to open your airway while you sleep, use it only as told by your health care provider.  · Do not use any tobacco products, such as cigarettes, chewing tobacco, and e-cigarettes. If you need help quitting, ask your health care provider.  · Keep all follow-up visits as told by your health care provider. This is important.  Contact a health care provider if:  · The device that you received to open your airway during sleep is uncomfortable or does not seem to be working.  · Your symptoms do not improve.  · Your symptoms get worse.  Get help right away if:  · You develop chest pain.  · You develop shortness of breath.  · You develop discomfort in your back, arms, or stomach.  · You have trouble speaking.  · You have weakness on one side of your body.  · You have drooping in your face.  These symptoms may represent a serious problem that is an emergency. Do not wait to see if the symptoms will go away. Get medical help right away. Call your local emergency services (911 in the [place] Do not drive yourself to the hospital.  This information is not intended to replace advice given to you by your health care provider. Make sure you discuss any questions you have with your health care provider.  Document Released: 12/08/2003 Document Revised: 07/16/2018 Document Reviewed: 09/26/2016  [employer]  Patient Education © 2019 [employer]

## 2019-07-11 ENCOUNTER — TELEPHONE (OUTPATIENT)
Dept: NEUROLOGY | Facility: CLINIC | Age: 59
End: 2019-07-11

## 2019-07-11 NOTE — TELEPHONE ENCOUNTER
----- Message from YOLANDA Colon sent at 7/11/2019 10:48 AM CDT -----  Okay. It would be beneficial for him to continue use of pap treatment for sleep apnea but if he is not able to tolerate we need to document.   ----- Message -----  From: Little Ace LPN  Sent: 7/10/2019   4:34 PM  To: YOLANDA Colon    Noe left a message that he tried a different mask yesterday and he can't sleep with it on.  I returned his call and left a message asking him to return my call.

## 2019-07-12 DIAGNOSIS — G47.34 NOCTURNAL HYPOXIA: ICD-10-CM

## 2019-07-12 DIAGNOSIS — G47.31 COMPLEX SLEEP APNEA SYNDROME: ICD-10-CM

## 2019-07-16 DIAGNOSIS — R00.1 BRADYCARDIA: Primary | ICD-10-CM

## 2019-07-18 DIAGNOSIS — G47.31 COMPLEX SLEEP APNEA SYNDROME: Primary | ICD-10-CM

## 2019-07-30 ENCOUNTER — HOSPITAL ENCOUNTER (OUTPATIENT)
Dept: CARDIOLOGY | Facility: HOSPITAL | Age: 59
Discharge: HOME | End: 2019-07-30
Admitting: NURSE PRACTITIONER

## 2019-07-30 DIAGNOSIS — R00.1 BRADYCARDIA: ICD-10-CM

## 2019-07-30 PROCEDURE — 93226 XTRNL ECG REC<48 HR SCAN A/R: CPT

## 2019-07-30 PROCEDURE — 93225 XTRNL ECG REC<48 HRS REC: CPT

## 2019-08-07 PROCEDURE — 93227 XTRNL ECG REC<48 HR R&I: CPT | Performed by: INTERNAL MEDICINE

## 2019-08-15 ENCOUNTER — OFFICE VISIT (OUTPATIENT)
Dept: NEUROLOGY | Facility: CLINIC | Age: 59
End: 2019-08-15

## 2019-08-15 VITALS
DIASTOLIC BLOOD PRESSURE: 62 MMHG | WEIGHT: 198 LBS | BODY MASS INDEX: 28.35 KG/M2 | SYSTOLIC BLOOD PRESSURE: 118 MMHG | HEART RATE: 72 BPM | HEIGHT: 70 IN | RESPIRATION RATE: 18 BRPM

## 2019-08-15 DIAGNOSIS — M54.81 OCCIPITAL NEURALGIA OF LEFT SIDE: ICD-10-CM

## 2019-08-15 DIAGNOSIS — G47.33 OSA (OBSTRUCTIVE SLEEP APNEA): Primary | ICD-10-CM

## 2019-08-15 PROCEDURE — 99214 OFFICE O/P EST MOD 30 MIN: CPT | Performed by: PSYCHIATRY & NEUROLOGY

## 2019-08-15 RX ORDER — PROPRANOLOL HYDROCHLORIDE 10 MG/1
TABLET ORAL
Qty: 30 TABLET | Refills: 5 | Status: SHIPPED | OUTPATIENT
Start: 2019-08-15 | End: 2019-11-19 | Stop reason: SDUPTHER

## 2019-08-15 NOTE — PROGRESS NOTES
Subjective   Eric Caldera, 1960, is a male who is being seen today for   Chief Complaint   Patient presents with   • Tremors       HISTORY OF PRESENT ILLNESS: Extended follow-up for history of tremor.  Patient thinks tremor is stable on the propranolol 10 mg 1 p.o. nightly.  Not having any side effects from that.  Patient has BASSEM and CSA and had a BiPAP ST titration but is not able to use that.  Patient says he is tried 2 different masks and because of his pain in his neck and occipital area is not able to wear the device.  He wants to turn it in [facility].  Patient has oxygen also but was not to use that with the BiPAP ST.  However with him stopping the use of the BiPAP ST we may want to try him on oxygen itself.  Patient is to get overnight continuous oximetry on oxygen only.  Patient is seeing [facility] in [facility] about his depression.  Patient not suicidal.  Patient had the diagnosis of arachnoid cyst on recent MRI of stable with previous study in 12/18.  Neurosurgery has released the patient.  Patient still has his left occipital pain and has not gotten to see his pain management as of yet.    REVIEW OF SYSTEMS:   GENERAL: Blood pressure today 118/62 left arm seated in same standing with pulse 72  PULMONARY: As above  CVS: No acute chest pain or palpitation  GASTROINTESTINAL: No acute GI distress  GENITOURINARY: No acute  distress  GYN: Not applicable  MUSCULOSKELETAL: As above.  Patient has chronic back pain  HEENT: No acute vision or hearing change  ENDOCRINE: No acute endocrine symptoms  PSYCHIATRIC: As above  HEMATOLOGY: No anemia  SKIN: No acute skin changes  Family history reviewed and otherwise noncontributory  Social history: Patient denies smoking or drug or alcohol use      PHYSICAL EXAMINATION:    GENERAL: No acute distress except for some tenderness in the left greater occipital area to palpation as well cervical paraspinals on the left and scalp on the left  CRANIUM:  Normocephalic/atraumatic  HEENT: No acute fundic abnormalities.  Pupils equal round reactive to light.       EYES: EOMs intact without nystagmus and fields full to confrontation       EARS: Tympanic membranes obscured by wax bilaterally but hears tuning fork bilaterally       THROAT: No oropharynx abnormalities       NECK: No bruit/no lymphadenopathy  CHEST: No acute cardiopulmonary normalities by auscultation  ABDOMEN: Nondistended  EXTREMITIES: Pulses symmetrical  NEURO: Patient alert and follows commands without difficulty  SPEECH: Normal    CRANIAL NERVES: Motor/sensory about the face normal and symmetric    MOTOR STRENGTH: Motor strength upper and lower extremities normal  STATION AND GAIT: Gait normal/Romberg negative  CEREBELLAR: Finger-nose and heel-to-shin normal  SENSORY: Normal pin and vibration upper and lower extremities  REFLEXES: Reflexes present symmetric upper lower extremity without clonus or Babinski    ASSESSMENT AND PLAN: Patient with possible greater occipital neuralgia on the left and getting evaluation by pain management.  Tremor stable continue the propranolol.  No history of asthma or other breathing difficulties.  Patient is to try to see if oxygen is satisfactory for his BASSEM/CSA.  I spent 25 minutes with this patient 15 minutes counseling.Patient's Body mass index is 28.41 kg/m². BMI is above normal parameters. Recommendations include: referral to primary care.  Safety precautions and driving precautions reviewed      Eric was seen today for tremors.    Diagnoses and all orders for this visit:    BASSEM (obstructive sleep apnea)  -     Overnight Sleep Oximetry Study; Future    Occipital neuralgia of left side  -     Ambulatory Referral to Pain Management    Other orders  -     propranolol (INDERAL) 10 MG tablet; Take 1 tablet by mouth every night        Dictated utilizing Dragon voice recognition software

## 2019-08-21 ENCOUNTER — OFFICE VISIT (OUTPATIENT)
Dept: NEUROLOGY | Facility: CLINIC | Age: 59
End: 2019-08-21

## 2019-08-21 VITALS
BODY MASS INDEX: 28.63 KG/M2 | DIASTOLIC BLOOD PRESSURE: 62 MMHG | SYSTOLIC BLOOD PRESSURE: 130 MMHG | HEIGHT: 70 IN | HEART RATE: 70 BPM | RESPIRATION RATE: 18 BRPM | WEIGHT: 200 LBS

## 2019-08-21 DIAGNOSIS — G47.31 COMPLEX SLEEP APNEA SYNDROME: Primary | ICD-10-CM

## 2019-08-21 DIAGNOSIS — Z87.891 FORMER TOBACCO USE: ICD-10-CM

## 2019-08-21 DIAGNOSIS — E66.09 CLASS 1 OBESITY DUE TO EXCESS CALORIES WITH SERIOUS COMORBIDITY AND BODY MASS INDEX (BMI) OF 30.0 TO 30.9 IN ADULT: ICD-10-CM

## 2019-08-21 DIAGNOSIS — G47.34 NOCTURNAL HYPOXIA: ICD-10-CM

## 2019-08-21 PROCEDURE — 99213 OFFICE O/P EST LOW 20 MIN: CPT | Performed by: NURSE PRACTITIONER

## 2019-08-21 NOTE — PROGRESS NOTES
Subjective     Chief Complaint   Patient presents with   • Sleeping Problem       Eirc Saldana is a 59 y.o. male he follows up today for sleep apnea.    History of Present Illness  Mr. saldana is a pleasant 59-year-old male who presents today to follow-up for sleep apnea.  He was evaluated by Dr. Kearney last week at which point he told Dr. Kearney he no longer wish to utilize his Pap therapy.He turned his BIPAP machine yesterday.  Dr. Kearney ordered an overnight pulse on nocturnal oxygen at 2 lpm off of BIPAP. He has not yet had this performed. He tried multiple different masks with use of BIPAP and could not tolerate any of them. He does have both central and obstructive sleep apnea. He has an appointment to FU with Dr. Kearney in 3 months.He denies falling asleep while driving. He is currently wearing his oxygen nocturnally at 2 L/min and states he is compliant with this.He did have bradycardia noted on an overnight pulse ox was performed in July and was advised to FU with PCP. He has an appointment tomorrow with PCP. He denies any chest pain, palpitations. SOB. He denies the use of alcohol, tobacco, illicit drug use.     Follow-up sleep questionnaire reviewed in office today.  He continues to be quite somnolent with an Rayle scale of 13 in office today.  He denies any difficulty falling asleep while driving.    I did review compliance report for whenever he did use the machine throughout the months of July and early August.  He only wore the machine for 3 days, he was set at BiPAP 14/8 cm H2O with a back-up rate of 10 and his AHI was 0.3 on that setting.    Current Outpatient Medications   Medication Sig Dispense Refill   • Acetaminophen 500 MG coapsule Take  by mouth. Takes 3000mg a day     • atorvastatin (LIPITOR) 20 MG tablet Take 20 mg by mouth Daily.     • hydrOXYzine (ATARAX) 25 MG tablet Take 25 mg by mouth 3 (Three) Times a Day As Needed for Itching.     • levothyroxine (SYNTHROID, LEVOTHROID) 75 MCG  "tablet Take 100 mcg by mouth Daily. Take 125mcg daily     • Melatonin 10 MG tablet Take  by mouth Every Night.     • omega-3 acid ethyl esters (LOVAZA) 1 g capsule Take 1 g by mouth 4 (Four) Times a Day.     • propranolol (INDERAL) 10 MG tablet Take 1 tablet by mouth every night 30 tablet 5   • sertraline (ZOLOFT) 50 MG tablet Take 150 mg by mouth Daily.       No current facility-administered medications for this visit.        Past Medical History:   Diagnosis Date   • Arthritis    • Complex regional pain syndrome i of left lower limb    • Depression    • Hypertension    • Kidney stones    • Migraine    • Thyroid disease        Past Surgical History:   Procedure Laterality Date   • ANTERIOR LUMBAR EXPOSURE N/A 3/30/2018    Procedure: ANTERIOR LUMBAR EXPOSURE;  Surgeon: Samm Robb DO;  Location:  [facility] OR;  Service: Vascular   • ARM TENDON REPAIR Left     \"Rebuilding\" of tendons   • LEG SURGERY Right 2009    Removal of bone tumor   • LUMBAR LAMINECTOMY ANTERIOR LUMBAR INTERBODY FUSION Left 3/30/2018    Procedure: LUMBAR LAMINECTOMY ANTERIOR LUMBAR INTERBODY FUSION, Lumbar 5 to sacral 1 anterior interbody fusion and then posterior MIS L5 to S1 left decompression and instrumentation. O-arm;  Surgeon: Mehran Almaraz MD;  Location: [facility] OR;  Service: Neurosurgery   • LUMBAR SPINE SURGERY         family history includes Cancer in his father; No Known Problems in his mother.    Social History     Tobacco Use   • Smoking status: Former Smoker     Packs/day: 0.25     Years: 20.00     Pack years: 5.00     Types: Cigarettes     Last attempt to quit: 2018     Years since quittin.4   • Smokeless tobacco: Never Used   Substance Use Topics   • Alcohol use: No   • Drug use: No       Review of Systems   Constitutional: Positive for fatigue.   HENT: Negative.    Eyes: Negative.    Respiratory: Negative.    Cardiovascular: Negative.    Gastrointestinal: Negative.    Endocrine: Negative.  " "  Genitourinary: Negative.    Musculoskeletal: Negative.    Skin: Negative.    Allergic/Immunologic: Negative.    Neurological: Negative.    Hematological: Negative.    Psychiatric/Behavioral: Positive for sleep disturbance and depressed mood.   All other systems reviewed and are negative.      Objective     /62 (BP Location: Left arm, Patient Position: Sitting)   Pulse 70   Resp 18   Ht 177.8 cm (70\")   Wt 90.7 kg (200 lb)   BMI 28.70 kg/m² , Body mass index is 28.7 kg/m².    Physical Exam   Constitutional: He is oriented to person, place, and time. He appears well-developed and well-nourished.   HENT:   Head: Normocephalic and atraumatic.   Eyes: EOM are normal. Pupils are equal, round, and reactive to light.   Neck: Normal range of motion. Neck supple.   Cardiovascular: Normal rate, normal heart sounds and intact distal pulses.   Pulmonary/Chest: Effort normal and breath sounds normal.   Abdominal: Soft.   Musculoskeletal: Normal range of motion.   Neurological: He is alert and oriented to person, place, and time. He has normal reflexes.   Skin: Skin is warm and dry. Capillary refill takes less than 2 seconds.   Psychiatric: He has a normal mood and affect. His speech is normal and behavior is normal. Cognition and memory are normal.   Nursing note and vitals reviewed.        ASSESSMENT/PLAN    Eric was seen today for sleeping problem.    Diagnoses and all orders for this visit:    Complex sleep apnea syndrome    Nocturnal hypoxia    Class 1 obesity due to excess calories with serious comorbidity and body mass index (BMI) of 30.0 to 30.9 in adult    Former tobacco use          Allergies and all known medications/prescriptions have been reviewed using resources available on this encounter.    Patient's Body mass index is 28.7 kg/m². BMI is above normal parameters. Recommendations include: referral to primary care.    No Follow-up on file.    MEDICAL DECISION MAKIN.  Compliance report reviewed " in office today.  Patient does have complex sleep apnea syndrome of both central and obstructive events.  He does not wish to continue with BiPAP therapy at this time.  He is on nocturnal oxygen at 2 L/min and is to have a repeat overnight pulse oximetry per order by Dr. Kearney.  I did print off his order for him to take over to his [employer] company to have this obtained today.  He is agreeable to this plan.  Risk of not treating sleep apnea were discussed in office today.  2. Counseled on multimodal approach to treatment of sleep apnea to include but not limited to diet, exercise, sleep hygiene, compliance with pap therapy. Encouraged lateral sleeping position and to avoid sedatives or alcohol close to bedtime. Risks of untreated sleep apnea were discussed to include but not limited to HTN, heart disease, stroke, cardiac arrhythmia such as AFIB, and dementia.   3.  Nocturnal hypoxia, currently on nocturnal oxygen 2 L/min.  4.  Continue to follow-up with Dr. Kearney as scheduled in 3 months for neurological concerns.  5.  BP to be monitored per PCP  6.  Bradycardia noted on Holter monitor.  Symptoms of bradycardia discussed the patient office today.  I did print off this result for him to take to his primary care provider tomorrow for their appointment to discuss for follow-up.  He is agreeable to this plan.  He denies any current chest pain, palpitations, PND, orthopnea.    This point time no need to follow-up in the sleep apnea clinic.  His oxygen can be monitored per Dr. Kearney in follow-up.  Did advise him if he ever wish to resume treatment of sleep apnea to let us know.      YOLANDA Colon

## 2019-08-27 DIAGNOSIS — G47.33 OSA (OBSTRUCTIVE SLEEP APNEA): ICD-10-CM

## 2019-11-19 ENCOUNTER — OFFICE VISIT (OUTPATIENT)
Dept: NEUROLOGY | Facility: CLINIC | Age: 59
End: 2019-11-19

## 2019-11-19 VITALS
SYSTOLIC BLOOD PRESSURE: 122 MMHG | WEIGHT: 202 LBS | BODY MASS INDEX: 28.92 KG/M2 | DIASTOLIC BLOOD PRESSURE: 70 MMHG | RESPIRATION RATE: 18 BRPM | HEART RATE: 72 BPM | HEIGHT: 70 IN

## 2019-11-19 DIAGNOSIS — R25.1 TREMOR: Primary | ICD-10-CM

## 2019-11-19 DIAGNOSIS — G47.31 COMPLEX SLEEP APNEA SYNDROME: ICD-10-CM

## 2019-11-19 DIAGNOSIS — G89.29 CHRONIC PAIN OF RIGHT UPPER EXTREMITY: ICD-10-CM

## 2019-11-19 DIAGNOSIS — R09.02 HYPOXIA: ICD-10-CM

## 2019-11-19 DIAGNOSIS — M54.81 OCCIPITAL NEURALGIA OF LEFT SIDE: ICD-10-CM

## 2019-11-19 DIAGNOSIS — M79.601 CHRONIC PAIN OF RIGHT UPPER EXTREMITY: ICD-10-CM

## 2019-11-19 PROCEDURE — 99214 OFFICE O/P EST MOD 30 MIN: CPT | Performed by: PSYCHIATRY & NEUROLOGY

## 2019-11-19 RX ORDER — PROPRANOLOL HYDROCHLORIDE 10 MG/1
TABLET ORAL
Qty: 30 TABLET | Refills: 5 | Status: SHIPPED | OUTPATIENT
Start: 2019-11-19 | End: 2020-04-16 | Stop reason: SDUPTHER

## 2019-11-19 NOTE — PROGRESS NOTES
Subjective   Eric Caldera, 1960, is a male who is being seen today for   Chief Complaint   Patient presents with   • Tremors   • Headache       HISTORY OF PRESENT ILLNESS: Extended follow-up.  Patient with a new problem of right upper extremity pain.  Patient's tremor and headaches are stable although he still having the left greater occipital neuralgia and going to pain management.  Patient has the complex sleep apnea and hypoxia but refuses his use of oxygen or BiPAP ST even though his overnight continuous oximetry on oxygen look much better.  Patient understands the risks of not using the treatments.  Patient now says his right arm swells like his left arm did several years ago when he ended to have surgery on the arm from an orthopedist.  He said that several years ago nerves were pinched.  Patient denies any neck pain or injury.  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    REVIEW OF SYSTEMS:   GENERAL: Patient's blood pressure today 122/70 left arm seated in same standing with pulse 72  PULMONARY: As above  CVS: No acute chest pain or palpitation  GASTROINTESTINAL: No acute GI distress  GENITOURINARY: No acute  distress  GYN: Not applicable  MUSCULOSKELETAL: As above  HEENT: No acute vision or hearing change  ENDOCRINE: No acute endocrine symptoms  PSYCHIATRIC: No acute psychiatric symptoms  HEMATOLOGY: No recent blood work for review  SKIN: As above  Family history reviewed and otherwise noncontributory  Social history: Patient denies smoking or drug or alcohol use    PHYSICAL EXAMINATION:    GENERAL: Positive Tinel's wrist and elbow bilaterally with negative Phalen sign negative Adson's maneuvers  CRANIUM: Normocephalic/atraumatic  HEENT:        EYES: No acute fundic abnormalities.  Pupils equal round reactive to light.  EOMs intact without nystagmus.  Fields full to confrontation.       EARS: Tympanic membrane  obscured on the left by wax/ hears tuning fork bilaterally       THROAT: No acute oropharynx abnormalities       NECK: No bruit/no lymphadenopathy.  There is some tenderness to palpation left greater occipital nerve  CHEST: No acute cardiopulmonary normalities by auscultation  ABDOMEN: Nondistended  EXTREMITIES: Intentional tremor bilaterally without cogwheeling or rigidity  NEURO: Patient alert and follows commands without difficulty  SPEECH: Normal    CRANIAL NERVES: Motor/sensory about the face normal symmetric  EXTREMITIES: Dorsalis pedis pulses symmetrical  MOTOR STRENGTH: Motor strength upper lower extremities normal  STATION AND GAIT: Gait normal/Romberg negative  CEREBELLAR: Finger-nose and heel-to-shin normal  SENSORY: Normal pin and vibration upper and lower extremities  REFLEXES: Reflexes present symmetric upper lower extremity without clonus or Babinski      ASSESSMENT AND PLAN: Patient with right upper extremity symptoms as above and to get EMG and nerve conduction right upper extremity with thoracic outlet studies.  Patient continue propranolol.  Patient have safety precautions and driving precautions.  I spent 25 minutes with this patient 15 minutes counseling.Patient's Body mass index is 28.98 kg/m². BMI is above normal parameters. Recommendations include: referral to primary care.      Eric was seen today for tremors and headache.    Diagnoses and all orders for this visit:    Tremor    Occipital neuralgia of left side    Complex sleep apnea syndrome    Hypoxia    Chronic pain of right upper extremity  -     EMG & Nerve Conduction Test; Future    Other orders  -     propranolol (INDERAL) 10 MG tablet; Take 1 tablet by mouth every night        Dictated utilizing Dragon voice recognition software

## 2019-11-19 NOTE — PATIENT INSTRUCTIONS
Patient to continue driving precautions and safety precautions as previously discussed.  Patient to get with PCP about weight and diet control.  Patient to get with Dr. Almaraz about right leg pain

## 2019-12-05 ENCOUNTER — HOSPITAL ENCOUNTER (OUTPATIENT)
Dept: ULTRASOUND IMAGING | Facility: HOSPITAL | Age: 59
Discharge: HOME | End: 2019-12-05

## 2019-12-05 ENCOUNTER — HOSPITAL ENCOUNTER (OUTPATIENT)
Dept: NEUROLOGY | Facility: HOSPITAL | Age: 59
Discharge: HOME | End: 2019-12-05
Admitting: PSYCHIATRY & NEUROLOGY

## 2019-12-05 DIAGNOSIS — G89.29 CHRONIC PAIN OF RIGHT UPPER EXTREMITY: ICD-10-CM

## 2019-12-05 DIAGNOSIS — M79.601 CHRONIC PAIN OF RIGHT UPPER EXTREMITY: ICD-10-CM

## 2019-12-05 PROCEDURE — 95886 MUSC TEST DONE W/N TEST COMP: CPT | Performed by: PSYCHIATRY & NEUROLOGY

## 2019-12-05 PROCEDURE — 93923 UPR/LXTR ART STDY 3+ LVLS: CPT

## 2019-12-05 PROCEDURE — 95909 NRV CNDJ TST 5-6 STUDIES: CPT | Performed by: PSYCHIATRY & NEUROLOGY

## 2019-12-05 PROCEDURE — 93923 UPR/LXTR ART STDY 3+ LVLS: CPT | Performed by: SURGERY

## 2019-12-05 PROCEDURE — 95886 MUSC TEST DONE W/N TEST COMP: CPT

## 2019-12-05 PROCEDURE — 95909 NRV CNDJ TST 5-6 STUDIES: CPT

## 2019-12-18 ENCOUNTER — TELEPHONE (OUTPATIENT)
Dept: NEUROLOGY | Facility: CLINIC | Age: 59
End: 2019-12-18

## 2019-12-18 DIAGNOSIS — M54.2 NECK PAIN: ICD-10-CM

## 2019-12-18 DIAGNOSIS — R20.2 PARESTHESIA: Primary | ICD-10-CM

## 2019-12-18 NOTE — TELEPHONE ENCOUNTER
Mr. Caldera called to let Dr. Kearney know that he would like to go ahead with the MRI C-spine that Christel Williamson has suggested.     Mr. Caldera did address that he is having pain, numbness, tingling, and swelling in his right arm. Also, does have neck pain occasionally with certain movements.

## 2020-04-16 ENCOUNTER — OFFICE VISIT (OUTPATIENT)
Dept: NEUROLOGY | Facility: CLINIC | Age: 60
End: 2020-04-16

## 2020-04-16 DIAGNOSIS — M54.2 NECK PAIN: Primary | ICD-10-CM

## 2020-04-16 DIAGNOSIS — R94.131 ABNORMAL EMG: ICD-10-CM

## 2020-04-16 DIAGNOSIS — G47.31 COMPLEX SLEEP APNEA SYNDROME: ICD-10-CM

## 2020-04-16 DIAGNOSIS — R25.1 TREMOR: ICD-10-CM

## 2020-04-16 DIAGNOSIS — M79.601 RIGHT ARM PAIN: ICD-10-CM

## 2020-04-16 PROCEDURE — 99442 PR PHYS/QHP TELEPHONE EVALUATION 11-20 MIN: CPT | Performed by: PSYCHIATRY & NEUROLOGY

## 2020-04-16 RX ORDER — PROPRANOLOL HYDROCHLORIDE 10 MG/1
TABLET ORAL
Qty: 30 TABLET | Refills: 5 | Status: SHIPPED | OUTPATIENT
Start: 2020-04-16 | End: 2020-09-21 | Stop reason: SDUPTHER

## 2020-04-16 NOTE — PROGRESS NOTES
Patient had 15-minute telephone telemedicine.  Patient has complex sleep apnea and cannot tolerate Pap device.  Patient was supposed to be wearing oxygen 2 L/min nightly but says he wears it during the day sometimes he cannot wear it at night and is encouraged to wear it more at night.  Patient's overnight continuous oximetry on the oxygen was improved and stable.  Nerve conductions were done for right upper extremity pain and neck pain which were borderline findings no definite peripheral nerve entrapments though could not rule out some diffuse process there.  And his EMG suggested some cervical nerve root irritation C5/C6 and C6/C7 and he was supposed to get back with us about settling of the MRI cervical but never did.  We will go ahead and process that.  It is not urgent and can be done after the pandemic.  Patient does complain of neck pain and right arm pain.  Patient has had right hand swelling the last week or so breaking out because of washing dishes he says.  Patient is to get with PCP about that.  Patient thoracic outlet studies were negative.  Patient is taking the propranolol low dosage of 10 mg p.o. daily without side effects/for the intentional tremor which he says is stable.  Patient is disabled and not working /final diagnosis is complex sleep apnea/is to have safety and driving precautions as reviewed.  Abnormal EMG/intentional tremor.  Patient to continue the propranolol unchanged.  Hayden Sleepiness Scale is 3.  Stop bang is 3 of 7.

## 2020-04-16 NOTE — PATIENT INSTRUCTIONS
Patient to try to use nasal cannula oxygen while sleeping.  Patient to continue driving precautions and safety precautions as discussed.  Patient to get with PCP about breakout of skin on his right hand.  Patient to get with PCP about weight and diet control.

## 2020-07-24 ENCOUNTER — HOSPITAL ENCOUNTER (OUTPATIENT)
Dept: MRI IMAGING | Facility: HOSPITAL | Age: 60
Discharge: HOME | End: 2020-07-24
Admitting: PSYCHIATRY & NEUROLOGY

## 2020-07-24 DIAGNOSIS — M54.2 NECK PAIN: ICD-10-CM

## 2020-07-24 PROCEDURE — 72141 MRI NECK SPINE W/O DYE: CPT

## 2020-09-01 ENCOUNTER — TELEPHONE (OUTPATIENT)
Dept: NEUROLOGY | Facility: CLINIC | Age: 60
End: 2020-09-01

## 2020-09-01 NOTE — TELEPHONE ENCOUNTER
PT IS STILL HAVING PAIN IN RT ELBOW MAKING FINGERS GO NUMB AND THAT BEEN GOING ON FOR QUITE A WHILE. PT WANTS TO KNOW WHAT DR. BARRIOS WOULD RECOMMEND.

## 2020-09-16 ENCOUNTER — TELEPHONE (OUTPATIENT)
Dept: NEUROLOGY | Facility: CLINIC | Age: 60
End: 2020-09-16

## 2020-09-16 DIAGNOSIS — M48.02 SPINAL STENOSIS, CERVICAL REGION: ICD-10-CM

## 2020-09-16 DIAGNOSIS — M47.812 CERVICAL SPONDYLOSIS: Primary | ICD-10-CM

## 2020-09-16 NOTE — TELEPHONE ENCOUNTER
THE PT CALLED AND SAID HE WOULD LIKE A REFERRAL OVER TO NEURO SURGERY BASED ON HIS LAST MRI .  AND YOU COULD CALL HIM BACK [phone]

## 2020-09-16 NOTE — TELEPHONE ENCOUNTER
Noe said he would like a referral to [facility] neurosurgery.  Notified I would send a message to Dr. Kearney.

## 2020-09-21 ENCOUNTER — TELEPHONE (OUTPATIENT)
Dept: NEUROLOGY | Facility: CLINIC | Age: 60
End: 2020-09-21

## 2020-09-21 ENCOUNTER — OFFICE VISIT (OUTPATIENT)
Dept: NEUROLOGY | Facility: CLINIC | Age: 60
End: 2020-09-21

## 2020-09-21 ENCOUNTER — DOCUMENTATION (OUTPATIENT)
Dept: NEUROLOGY | Facility: CLINIC | Age: 60
End: 2020-09-21

## 2020-09-21 VITALS
DIASTOLIC BLOOD PRESSURE: 72 MMHG | WEIGHT: 192.2 LBS | HEART RATE: 74 BPM | SYSTOLIC BLOOD PRESSURE: 122 MMHG | HEIGHT: 70 IN | RESPIRATION RATE: 18 BRPM | BODY MASS INDEX: 27.52 KG/M2

## 2020-09-21 DIAGNOSIS — R25.1 TREMOR: ICD-10-CM

## 2020-09-21 DIAGNOSIS — G47.31 COMPLEX SLEEP APNEA SYNDROME: Primary | ICD-10-CM

## 2020-09-21 DIAGNOSIS — M47.812 CERVICAL SPONDYLOSIS: ICD-10-CM

## 2020-09-21 PROCEDURE — 99214 OFFICE O/P EST MOD 30 MIN: CPT | Performed by: PSYCHIATRY & NEUROLOGY

## 2020-09-21 RX ORDER — PROPRANOLOL HYDROCHLORIDE 10 MG/1
TABLET ORAL
Qty: 30 TABLET | Refills: 5 | Status: SHIPPED | OUTPATIENT
Start: 2020-09-21 | End: 2021-03-23 | Stop reason: SDUPTHER

## 2020-09-21 RX ORDER — CYCLOBENZAPRINE HCL 10 MG
10 TABLET ORAL NIGHTLY
COMMUNITY
Start: 2020-09-05 | End: 2021-03-23 | Stop reason: ALTCHOICE

## 2020-09-21 RX ORDER — TEMAZEPAM 15 MG/1
15 CAPSULE ORAL
COMMUNITY
Start: 2020-09-11 | End: 2022-11-01

## 2020-09-21 RX ORDER — ALUMINUM ZIRCONIUM TRICHLOROHYDREX GLY 0.19 G/G
20 STICK TOPICAL DAILY
COMMUNITY
Start: 2020-07-14 | End: 2022-03-24 | Stop reason: SDUPTHER

## 2020-09-21 RX ORDER — LEVOTHYROXINE SODIUM 0.15 MG/1
150 TABLET ORAL DAILY
COMMUNITY

## 2020-09-21 NOTE — PROGRESS NOTES
I did call Dr. Almaraz office and did talk with Tracey about making an appointment for an abnormal MRI of the cervical spine.  She will contact 's MA and will call the patient if accepted.

## 2020-09-21 NOTE — PROGRESS NOTES
Subjective   Eric Caldera, 1960, is a male who is being seen today for   Chief Complaint   Patient presents with   • Sleeping Problem   • Tremors       HISTORY OF PRESENT ILLNESS: Extended follow-up.  Patient with a history of complex sleep apnea using his oxygen at night.  He cannot tolerate Pap device.  His not having any trouble driving.  His overnight continuous oximetry on the oxygen had looked good last year.  Patient EMG and MRI C-spine suggested possible nerve root irritations and patient was referred to her neurosurgery for evaluation.  Follow-up is to be determined.  Patient stable with the tremor on the Inderal low dosage.  Patient complains of left cervical muscle tenderness and occipital neuralgia    REVIEW OF SYSTEMS:   GENERAL: Today blood pressure 120/70 left arm seated and standing 122/72 with pulse 74.  PULMONARY: As above  CVS: No acute chest pain or palpitation  GASTROINTESTINAL: No acute GI distress  GENITOURINARY: No acute  distress  GYN: Not applicable  MUSCULOSKELETAL: Patient has chronic back pain  HEENT: No acute vision or hearing change  ENDOCRINE: No acute endocrine symptoms  PSYCHIATRIC: No acute psychiatric symptoms  HEMATOLOGY: PCP follows his blood work  SKIN: No acute skin changes  Family history reviewed and otherwise noncontributory to this problem  Social history: Patient denies smoking or drug or alcohol use    PHYSICAL EXAMINATION:    GENERAL: Patient has lost several pounds purposely  CRANIUM: Normal cephalic/atraumatic.  Patient has some tenderness of the left cervical paraspinals to palpation  HEENT:       EYES: No acute fundic abnormalities.  Pupils equal round reactive to light.  EOMs intact without nystagmus and fields full to confrontation       EARS: Tympanic membranes normal and hears tuning fork bilaterally       THROAT: No acute oropharynx abnormalities       NECK: No bruits/no lymphadenopathy  CHEST: No acute cardiopulmonary abnormalities by  auscultation  ABDOMEN: Nondistended  EXTREMITIES: Dorsalis pedis pulses symmetrical  NEURO: Patient alert and follows commands without difficulty  SPEECH: Normal    CRANIAL NERVES: Motor/sensory about the face normal and symmetric    MOTOR STRENGTH: Motor strength upper and lower extremities normal  STATION AND GAIT: Gait normal with somewhat effected by patient's back brain/Romberg negative  CEREBELLAR: Finger-nose and heel-to-shin normal  SENSORY:  Pin and vibration upper and lower extremities normal  REFLEXES: Decreased ankle jerk versus knee jerk and biceps without clonus or Babinski  OTHER: No significant intentional tremor or rest tremor noted.    ASSESSMENT AND PLAN: Patient with history of complex sleep apnea and treated by oxygen at this time.  Safety precautions reviewed.  Patient tremor stable.  Patient to be evaluated for neck symptoms with neck pain and spondylosis as per neurosurgical group.  I spent 25 minutes with this patient with 15 minutes counseling.Patient's Body mass index is 27.58 kg/m². BMI is within normal parameters. No follow-up required..      Eric was seen today for sleeping problem and tremors.    Diagnoses and all orders for this visit:    Complex sleep apnea syndrome    Cervical spondylosis    Tremor    Other orders  -     propranolol (INDERAL) 10 MG tablet; Take 1 tablet by mouth every night        Dictated utilizing Dragon voice recognition software

## 2020-12-30 ENCOUNTER — OFFICE VISIT (OUTPATIENT)
Dept: NEUROSURGERY | Facility: CLINIC | Age: 60
End: 2020-12-30

## 2020-12-30 VITALS — WEIGHT: 192 LBS | BODY MASS INDEX: 27.49 KG/M2 | HEIGHT: 70 IN

## 2020-12-30 DIAGNOSIS — Z87.891 FORMER TOBACCO USE: ICD-10-CM

## 2020-12-30 DIAGNOSIS — E66.3 OVERWEIGHT (BMI 25.0-29.9): ICD-10-CM

## 2020-12-30 DIAGNOSIS — G56.21 ULNAR NEUROPATHY OF RIGHT UPPER EXTREMITY: Primary | ICD-10-CM

## 2020-12-30 PROCEDURE — 99214 OFFICE O/P EST MOD 30 MIN: CPT | Performed by: NEUROLOGICAL SURGERY

## 2020-12-30 RX ORDER — ARM BRACE
EACH MISCELLANEOUS
Qty: 1 EACH | Refills: 0 | Status: SHIPPED | OUTPATIENT
Start: 2020-12-30 | End: 2022-11-01

## 2020-12-30 NOTE — PATIENT INSTRUCTIONS
"PATIENT TO CONTINUE TO FOLLOW UP WITH HIS/HER PRIMARY CARE PROVIDER FOR YEARLY PHYSICAL EXAMS TO ENSURE COMPLETE HEALTH MAINTENANCE    DASH Eating Plan  DASH stands for \"Dietary Approaches to Stop Hypertension.\" The DASH eating plan is a healthy eating plan that has been shown to reduce high blood pressure (hypertension). It may also reduce your risk for type 2 diabetes, heart disease, and stroke. The DASH eating plan may also help with weight loss.  What are tips for following this plan?    General guidelines  · Avoid eating more than 2,300 mg (milligrams) of salt (sodium) a day. If you have hypertension, you may need to reduce your sodium intake to 1,500 mg a day.  · Limit alcohol intake to no more than 1 drink a day for nonpregnant women and 2 drinks a day for men. One drink equals 12 oz of beer, 5 oz of wine, or 1½ oz of hard liquor.  · Work with your health care provider to maintain a healthy body weight or to lose weight. Ask what an ideal weight is for you.  · Get at least 30 minutes of exercise that causes your heart to beat faster (aerobic exercise) most days of the week. Activities may include walking, swimming, or biking.  · Work with your health care provider or diet and nutrition specialist (dietitian) to adjust your eating plan to your individual calorie needs.  Reading food labels    · Check food labels for the amount of sodium per serving. Choose foods with less than 5 percent of the Daily Value of sodium. Generally, foods with less than 300 mg of sodium per serving fit into this eating plan.  · To find whole grains, look for the word \"whole\" as the first word in the ingredient list.  Shopping  · Buy products labeled as \"low-sodium\" or \"no salt added.\"  · Buy fresh foods. Avoid canned foods and premade or frozen meals.  Cooking  · Avoid adding salt when cooking. Use salt-free seasonings or herbs instead of table salt or sea salt. Check with your health care provider or pharmacist before using salt " substitutes.  · Do not jackson foods. Cook foods using healthy methods such as baking, boiling, grilling, and broiling instead.  · Cook with heart-healthy oils, such as olive, canola, soybean, or sunflower oil.  Meal planning  · Eat a balanced diet that includes:  ? 5 or more servings of fruits and vegetables each day. At each meal, try to fill half of your plate with fruits and vegetables.  ? Up to 6-8 servings of whole grains each day.  ? Less than 6 oz of lean meat, poultry, or fish each day. A 3-oz serving of meat is about the same size as a deck of cards. One egg equals 1 oz.  ? 2 servings of low-fat dairy each day.  ? A serving of nuts, seeds, or beans 5 times each week.  ? Heart-healthy fats. Healthy fats called Omega-3 fatty acids are found in foods such as flaxseeds and coldwater fish, like sardines, salmon, and mackerel.  · Limit how much you eat of the following:  ? Canned or prepackaged foods.  ? Food that is high in trans fat, such as fried foods.  ? Food that is high in saturated fat, such as fatty meat.  ? Sweets, desserts, sugary drinks, and other foods with added sugar.  ? Full-fat dairy products.  · Do not salt foods before eating.  · Try to eat at least 2 vegetarian meals each week.  · Eat more home-cooked food and less restaurant, buffet, and fast food.  · When eating at a restaurant, ask that your food be prepared with less salt or no salt, if possible.  What foods are recommended?  The items listed may not be a complete list. Talk with your dietitian about what dietary choices are best for you.  Grains  Whole-grain or whole-wheat bread. Whole-grain or whole-wheat pasta. Brown rice. Oatmeal. Quinoa. Bulgur. Whole-grain and low-sodium cereals. Ewa bread. Low-fat, low-sodium crackers. Whole-wheat flour tortillas.  Vegetables  Fresh or frozen vegetables (raw, steamed, roasted, or grilled). Low-sodium or reduced-sodium tomato and vegetable juice. Low-sodium or reduced-sodium tomato sauce and tomato  paste. Low-sodium or reduced-sodium canned vegetables.  Fruits  All fresh, dried, or frozen fruit. Canned fruit in natural juice (without added sugar).  Meat and other protein foods  Skinless chicken or turkey. Ground chicken or turkey. Pork with fat trimmed off. Fish and seafood. Egg whites. Dried beans, peas, or lentils. Unsalted nuts, nut butters, and seeds. Unsalted canned beans. Lean cuts of beef with fat trimmed off. Low-sodium, lean deli meat.  Dairy  Low-fat (1%) or fat-free (skim) milk. Fat-free, low-fat, or reduced-fat cheeses. Nonfat, low-sodium ricotta or cottage cheese. Low-fat or nonfat yogurt. Low-fat, low-sodium cheese.  Fats and oils  Soft margarine without trans fats. Vegetable oil. Low-fat, reduced-fat, or light mayonnaise and salad dressings (reduced-sodium). Canola, safflower, olive, soybean, and sunflower oils. Avocado.  Seasoning and other foods  Herbs. Spices. Seasoning mixes without salt. Unsalted popcorn and pretzels. Fat-free sweets.  What foods are not recommended?  The items listed may not be a complete list. Talk with your dietitian about what dietary choices are best for you.  Grains  Baked goods made with fat, such as croissants, muffins, or some breads. Dry pasta or rice meal packs.  Vegetables  Creamed or fried vegetables. Vegetables in a cheese sauce. Regular canned vegetables (not low-sodium or reduced-sodium). Regular canned tomato sauce and paste (not low-sodium or reduced-sodium). Regular tomato and vegetable juice (not low-sodium or reduced-sodium). Pickles. Olives.  Fruits  Canned fruit in a light or heavy syrup. Fried fruit. Fruit in cream or butter sauce.  Meat and other protein foods  Fatty cuts of meat. Ribs. Fried meat. Webb. Sausage. Bologna and other processed lunch meats. Salami. Fatback. Hotdogs. Bratwurst. Salted nuts and seeds. Canned beans with added salt. Canned or smoked fish. Whole eggs or egg yolks. Chicken or turkey with skin.  Dairy  Whole or 2% milk,  cream, and half-and-half. Whole or full-fat cream cheese. Whole-fat or sweetened yogurt. Full-fat cheese. Nondairy creamers. Whipped toppings. Processed cheese and cheese spreads.  Fats and oils  Butter. Stick margarine. Lard. Shortening. Ghee. Webb fat. Tropical oils, such as coconut, palm kernel, or palm oil.  Seasoning and other foods  Salted popcorn and pretzels. Onion salt, garlic salt, seasoned salt, table salt, and sea salt. Worcestershire sauce. Tartar sauce. Barbecue sauce. Teriyaki sauce. Soy sauce, including reduced-sodium. Steak sauce. Canned and packaged gravies. Fish sauce. Oyster sauce. Cocktail sauce. Horseradish that you find on the shelf. Ketchup. Mustard. Meat flavorings and tenderizers. Bouillon cubes. Hot sauce and Tabasco sauce. Premade or packaged marinades. Premade or packaged taco seasonings. Relishes. Regular salad dressings.  Where to find more information:  · National Heart, Lung, and Blood [place]: www.nhlbi.nih.gov  · American Heart Association: www.heart.org  Summary  · The DASH eating plan is a healthy eating plan that has been shown to reduce high blood pressure (hypertension). It may also reduce your risk for type 2 diabetes, heart disease, and stroke.  · With the DASH eating plan, you should limit salt (sodium) intake to 2,300 mg a day. If you have hypertension, you may need to reduce your sodium intake to 1,500 mg a day.  · When on the DASH eating plan, aim to eat more fresh fruits and vegetables, whole grains, lean proteins, low-fat dairy, and heart-healthy fats.  · Work with your health care provider or diet and nutrition specialist (dietitian) to adjust your eating plan to your individual calorie needs.  This information is not intended to replace advice given to you by your health care provider. Make sure you discuss any questions you have with your health care provider.  Document Revised: 11/30/2018 Document Reviewed: 12/11/2017  Elsevier Patient Education © 2020 Elsevier  Inc.
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
## 2021-02-04 ENCOUNTER — TELEPHONE (OUTPATIENT)
Dept: NEUROLOGY | Facility: CLINIC | Age: 61
End: 2021-02-04

## 2021-02-04 DIAGNOSIS — R20.0 RIGHT UPPER EXTREMITY NUMBNESS: ICD-10-CM

## 2021-02-04 DIAGNOSIS — M47.812 CERVICAL SPONDYLOSIS: Primary | ICD-10-CM

## 2021-02-04 NOTE — TELEPHONE ENCOUNTER
----- Message from Roderick Kearney MD sent at 2/4/2021 12:58 PM CST -----  We will try to refer him to orthopedics [place] to see orthopedist there.  ----- Message -----  From: Little Ace LPN  Sent: 2/4/2021  12:50 PM CST  To: Roderick Kearney MD    Noe saw Dr. Almaraz and would like another referral for his arm.  He said he just moved here, he doesn't know the doctors and would like you to choose.  The muscles in his left leg become tight, his leg shakes from his hip to his foot and he can't move it.  He has had tremors in his leg in the past.

## 2021-03-03 ENCOUNTER — TELEPHONE (OUTPATIENT)
Dept: NEUROLOGY | Facility: CLINIC | Age: 61
End: 2021-03-03

## 2021-03-03 NOTE — TELEPHONE ENCOUNTER
Provider:     Caller: PT    Relationship to Patient: SELF    Phone Number: [phone]    Reason for Call: PT CALLED IN TODAY STATING THAT HIS ORTHO SURGEON TOLD HIM HE NEEDED TO GET ANOTHER MRI DONE. HE STATED  NEEDS TO ORDER IT. HE JUST WANTED TO LET HIM KNOW/ GET AN UPDATE ON IF HE WAS AWARE OF THIS ORDER. HE STATED HIS NEUROSURGEON TOLD HIM TO GET A SECOND OPINION. PLEASE ADVISE

## 2021-03-04 DIAGNOSIS — M54.2 NECK PAIN: ICD-10-CM

## 2021-03-04 DIAGNOSIS — M47.812 CERVICAL SPONDYLOSIS: Primary | ICD-10-CM

## 2021-03-04 NOTE — TELEPHONE ENCOUNTER
Contacted [facility], Dr. Avery's office wants a current MRI of the cervical spine to review.  He hasn't been seen at their office.

## 2021-03-23 ENCOUNTER — OFFICE VISIT (OUTPATIENT)
Dept: NEUROLOGY | Facility: CLINIC | Age: 61
End: 2021-03-23

## 2021-03-23 ENCOUNTER — HOSPITAL ENCOUNTER (OUTPATIENT)
Dept: MRI IMAGING | Facility: HOSPITAL | Age: 61
Discharge: HOME | End: 2021-03-23
Admitting: PSYCHIATRY & NEUROLOGY

## 2021-03-23 ENCOUNTER — DOCUMENTATION (OUTPATIENT)
Dept: NEUROLOGY | Facility: CLINIC | Age: 61
End: 2021-03-23

## 2021-03-23 VITALS
SYSTOLIC BLOOD PRESSURE: 140 MMHG | BODY MASS INDEX: 27.06 KG/M2 | HEART RATE: 74 BPM | HEIGHT: 70 IN | DIASTOLIC BLOOD PRESSURE: 82 MMHG | WEIGHT: 189 LBS | RESPIRATION RATE: 18 BRPM

## 2021-03-23 DIAGNOSIS — R09.02 HYPOXIA: ICD-10-CM

## 2021-03-23 DIAGNOSIS — M54.50 LUMBAR PAIN: ICD-10-CM

## 2021-03-23 DIAGNOSIS — M47.812 CERVICAL SPONDYLOSIS: ICD-10-CM

## 2021-03-23 DIAGNOSIS — R25.1 TREMOR: ICD-10-CM

## 2021-03-23 DIAGNOSIS — M54.2 NECK PAIN: ICD-10-CM

## 2021-03-23 DIAGNOSIS — M47.812 CERVICAL SPONDYLOSIS: Primary | ICD-10-CM

## 2021-03-23 DIAGNOSIS — M79.601 PAIN IN BOTH UPPER EXTREMITIES: ICD-10-CM

## 2021-03-23 DIAGNOSIS — M79.602 PAIN IN BOTH UPPER EXTREMITIES: ICD-10-CM

## 2021-03-23 PROCEDURE — 99215 OFFICE O/P EST HI 40 MIN: CPT | Performed by: PSYCHIATRY & NEUROLOGY

## 2021-03-23 PROCEDURE — 72141 MRI NECK SPINE W/O DYE: CPT

## 2021-03-23 RX ORDER — PRAZOSIN HYDROCHLORIDE 1 MG/1
1 CAPSULE ORAL NIGHTLY
COMMUNITY
Start: 2021-03-17

## 2021-03-23 RX ORDER — ATORVASTATIN CALCIUM 20 MG/1
20 TABLET, FILM COATED ORAL DAILY
COMMUNITY
Start: 2021-03-20 | End: 2023-03-23

## 2021-03-23 RX ORDER — PROPRANOLOL HYDROCHLORIDE 10 MG/1
TABLET ORAL
Qty: 30 TABLET | Refills: 5 | Status: SHIPPED | OUTPATIENT
Start: 2021-03-23 | End: 2022-03-16

## 2021-03-23 NOTE — PATIENT INSTRUCTIONS
Patient is to have safety and driving precautions as discussed. Patient get with PCP about weight and diet control

## 2021-03-23 NOTE — PROGRESS NOTES
Subjective   Noe Caldera, 1960, is a male who is being seen today for   Chief Complaint   Patient presents with   • Tremors   • Neurologic Problem     left leg numbness and pain       HISTORY OF PRESENT ILLNESS: Extended follow-up. Patient seen for a number problems including upper extremity pain more than right and left and the right forearm and tremor and low back pain radiating into the left leg and cervical spondylosis. Patient has seen Dr.Manjakanti costa previously for low back pain and is to be referred back. Patient had his cervical MRI which show mild spinal stenosis C5-6 and spondylosis bilaterally C 6-7. Patient has referral in for orthopedist to review this with the patient. Patient was seen by Dr. Almaraz and thought possibly to have  tardy ulnar  palsy on the right but nerve conductions in 2019 did not confirm that. Patient does notice some pain in the forearm draining somewhat more into the ulnar distribution. Patient is to get nerve conductions repeated on the upper extremities for that. Patient continues the low-dose propranolol 10 mg daily for his tremor which is stable to my exam. Patient was supposed to be using O2 regularly at night and is not very compliant with that. He has an [place] Sleepiness Scale of 5.    REVIEW OF SYSTEMS:   GENERAL: Blood pressure 134/80 left arm seated 140/82 left arm standing with pulse 74  PULMONARY: As above with history of complex sleep apnea  CVS: No acute chest pain or palpitation  GASTROINTESTINAL: No acute GI distress  GENITOURINARY: No acute  distress  GYN: Not applicable  MUSCULOSKELETAL: As above  HEENT: No acute vision or hearing change  ENDOCRINE: No acute endocrine symptoms  PSYCHIATRIC: No acute psychiatric symptoms  HEMATOLOGY: No recent blood work for review  SKIN: No acute skin changes  Family history reviewed and otherwise noncontributory to this problem    Social history: Patient denies smoking or drug or alcohol use  PHYSICAL  EXAMINATION:    GENERAL: Patient with negative Phalen sign for carpal tunnel syndrome but says he has some numbness with that test and is pinky finger on the right. Patient has positive Tinel's wrist and elbow but may be joint process. Patient has positive straight leg raising on the left  CRANIUM: Normal cephalic/atraumatic  HEENT:       EYES: EOMs intact without nystagmus and fields full to confrontation. No acute fundic abnormalities and pupils equal round reactive to light.       EARS: Tympanic membranes obscured by wax bilaterally but hears tuning fork bilaterally       THROAT: No acute oropharynx abnormalities and no swallowing difficulty by history       NECK: No bruits/no lymphadenopathy  CHEST: No acute cardiopulmonary abnormalities by auscultation  ABDOMEN: Nondistended  EXTREMITIES: Dorsalis pedis pulse symmetrical  NEURO: Patient alert follows commands without difficulty  SPEECH: Normal    CRANIAL NERVES: Motor/sensory about the face normal and symmetric    MOTOR STRENGTH: Motor strength upper and lower extremities normal  STATION AND GAIT: Gait favors the left lower extremity Romberg negative  CEREBELLAR: Finger-nose and heel-to-shin normal  SENSORY: Normal pin and vibration in the upper extremities bilaterally and in the lower extremities some decrease in pin vibration distal proximal more prominent left than the right to the ankles bilaterally  REFLEXES: Decreased ankle jerk bilaterally present knee jerk biceps and triceps without clonus or Babinski    ASSESSMENT AND PLAN: Patient with cervical spondylosis going to orthopedic referral. Patient with lumbar pain going to pain management. Patient tremor stable. Patient to use his oxygen nightly. Safety and driving precautions reviewed. I spent 40 minutes with this patient with counseling review of records and exam.      Diagnoses and all orders for this visit:    1. Cervical spondylosis (Primary)  -     Ambulatory Referral to Orthopedic Surgery    2.  Lumbar pain  -     Ambulatory Referral to Pain Management    3. Tremor    4. Hypoxia    Other orders  -     propranolol (INDERAL) 10 MG tablet; Take 1 tablet by mouth every night  Dispense: 30 tablet; Refill: 5        Dictated utilizing Dragon voice recognition software

## 2021-03-23 NOTE — PROGRESS NOTES
Patient states he has had blood work done at [facility] with Brynn Garvey.  I have contacted this facility and have requested the labs be sent to our office.  He did have a CBC,CMP,and lipid profile done. They will fax results when they are in.

## 2021-03-31 DIAGNOSIS — R25.1 TREMOR: ICD-10-CM

## 2021-05-07 ENCOUNTER — HOSPITAL ENCOUNTER (OUTPATIENT)
Dept: NEUROLOGY | Facility: HOSPITAL | Age: 61
Discharge: HOME | End: 2021-05-07
Admitting: PSYCHIATRY & NEUROLOGY

## 2021-05-07 DIAGNOSIS — M79.601 PAIN IN BOTH UPPER EXTREMITIES: ICD-10-CM

## 2021-05-07 DIAGNOSIS — M79.602 PAIN IN BOTH UPPER EXTREMITIES: ICD-10-CM

## 2021-05-07 PROCEDURE — 95911 NRV CNDJ TEST 9-10 STUDIES: CPT | Performed by: PSYCHIATRY & NEUROLOGY

## 2021-05-07 PROCEDURE — 95911 NRV CNDJ TEST 9-10 STUDIES: CPT

## 2021-09-23 ENCOUNTER — OFFICE VISIT (OUTPATIENT)
Dept: NEUROLOGY | Facility: CLINIC | Age: 61
End: 2021-09-23

## 2021-09-23 VITALS
BODY MASS INDEX: 27.06 KG/M2 | DIASTOLIC BLOOD PRESSURE: 64 MMHG | OXYGEN SATURATION: 99 % | RESPIRATION RATE: 16 BRPM | WEIGHT: 189 LBS | HEIGHT: 70 IN | SYSTOLIC BLOOD PRESSURE: 116 MMHG | HEART RATE: 58 BPM

## 2021-09-23 DIAGNOSIS — G47.31 COMPLEX SLEEP APNEA SYNDROME: Primary | ICD-10-CM

## 2021-09-23 DIAGNOSIS — R25.1 TREMOR: ICD-10-CM

## 2021-09-23 DIAGNOSIS — M47.812 CERVICAL SPONDYLOSIS: ICD-10-CM

## 2021-09-23 PROCEDURE — 99214 OFFICE O/P EST MOD 30 MIN: CPT | Performed by: NURSE PRACTITIONER

## 2021-09-23 RX ORDER — GABAPENTIN 100 MG/1
100 CAPSULE ORAL DAILY
COMMUNITY
Start: 2021-09-21 | End: 2022-11-01

## 2021-09-23 NOTE — PROGRESS NOTES
"  Neurology Progress Note      Chief Complaint:    Obstructive sleep apnea  Cervical Pain   Tremor    Subjective     Subjective:  Noe Caldera is a 61 y.o. male who presents to the office today for obstructive sleep apnea.  He has a history of cervical spondylosis, cervical pain, tremor, and BASSEM.  He was last seen by Dr. Christel MD.  He presents today with continued complaints of cervical pain and leg pain. Dr. Kearney referred him to pain management and orthopedic surgy for evaluation and management of these conditions. He is now on gabapentin per report and may potentially have a spinal cord stimulator placed.  He states tremor is stable with propranolol with no concerns.  He also has BASSEM but mentions skepticism with the sleep study as he did not feel he slept well.  He feels as if the study is not accurate due to this.  He stopped using CPAP therapy some time ago as he was unable to tolerate this.  Dr. Kearney placed him on nocturnal oxygen which he uses occasionally.  He states he sleeps well and will sometimes wake up at night.  He mentions that he gets restorative sleep and mentions no daytime hypersomnolence or snoring.  He feels well from a sleep standpoint.     Past Medical History:   Diagnosis Date   • Arthritis    • Complex regional pain syndrome i of left lower limb    • Depression    • Hypertension    • Kidney stones    • Migraine    • Thyroid disease      Past Surgical History:   Procedure Laterality Date   • ANTERIOR LUMBAR EXPOSURE N/A 3/30/2018    Procedure: ANTERIOR LUMBAR EXPOSURE;  Surgeon: Samm Robb DO;  Location: [facility] OR;  Service: Vascular   • ARM TENDON REPAIR Left 2011    \"Rebuilding\" of tendons   • LEG SURGERY Right 2009    Removal of bone tumor   • LUMBAR LAMINECTOMY ANTERIOR LUMBAR INTERBODY FUSION Left 3/30/2018    Procedure: LUMBAR LAMINECTOMY ANTERIOR LUMBAR INTERBODY FUSION, Lumbar 5 to sacral 1 anterior interbody fusion and then posterior MIS L5 to S1 left decompression " and instrumentation. O-arm;  Surgeon: Mehran Almaraz MD;  Location: [facility];  Service: Neurosurgery   • LUMBAR SPINE SURGERY  2005     Family History   Problem Relation Age of Onset   • Cancer Father    • No Known Problems Mother      Social History     Tobacco Use   • Smoking status: Former Smoker     Packs/day: 0.25     Years: 20.00     Pack years: 5.00     Types: Cigarettes     Quit date: 2/20/2018     Years since quitting: 3.5   • Smokeless tobacco: Never Used   Substance Use Topics   • Alcohol use: No   • Drug use: Not Currently     Types: Oxycodone     Comment: Hx of narcotic addiction     Medications:  Current Outpatient Medications   Medication Sig Dispense Refill   • Acetaminophen 500 MG coapsule Take 1 capsule by mouth Every 6 (Six) Hours As Needed. Takes 3000mg a day      • atorvastatin (LIPITOR) 20 MG tablet Take 20 mg by mouth Daily.     • Elastic Bandages & Supports ([facility] Elbow Brace) [facility] Please fit for R elbow brace, wear each night. Known ulnar neuropathy. 1 each 0   • gabapentin (NEURONTIN) 100 MG capsule Take 100 mg by mouth Daily.     • hydrocortisone 2.5 % cream APPLY ON THE SKIN TWICE A DAY     • levothyroxine (SYNTHROID, LEVOTHROID) 125 MCG tablet Take 125 mcg by mouth Daily.     • omega-3 acid ethyl esters (LOVAZA) 1 g capsule Take 1 g by mouth 4 (Four) Times a Day.     • prazosin (MINIPRESS) 1 MG capsule TAKE 1 TO 2 CAPSULES BY MOUTH ONCE DAILY AT BEDTIME FOR NIGHTMARES     • PrilOSEC OTC 20 MG EC tablet Take 20 mg by mouth Daily.     • propranolol (INDERAL) 10 MG tablet Take 1 tablet by mouth every night 30 tablet 5   • sertraline (ZOLOFT) 50 MG tablet Take 200 mg by mouth Daily. Takes 100mg 2 tabs daily     • temazepam (RESTORIL) 15 MG capsule Take 15 mg by mouth every night at bedtime.     • tiZANidine HCl (ZANAFLEX PO) Take  by mouth.       No current facility-administered medications for this visit.     Current outpatient and discharge medications have been reconciled for the  patient.  Reviewed by: YOLANDA Vu      Allergies:    Nsaids and Prednisone    Review of Systems:   Review of Systems   Musculoskeletal: Positive for arthralgias, back pain, gait problem, myalgias, neck pain and neck stiffness.   Psychiatric/Behavioral: Positive for sleep disturbance.   All other systems reviewed and are negative.        Objective      Vital Signs  Heart Rate:  [58] 58  Resp:  [16] 16  BP: (116)/(64) 116/64    Physical Exam:  Physical Exam  Constitutional:       Appearance: Normal appearance.   HENT:      Head: Normocephalic.   Eyes:      Extraocular Movements: Extraocular movements intact.      Pupils: Pupils are equal, round, and reactive to light.   Cardiovascular:      Rate and Rhythm: Normal rate and regular rhythm.      Pulses: Normal pulses.   Pulmonary:      Effort: Pulmonary effort is normal.   Musculoskeletal:         General: Normal range of motion.      Cervical back: Normal range of motion and neck supple.   Skin:     General: Skin is warm and dry.      Capillary Refill: Capillary refill takes less than 2 seconds.   Neurological:      Gait: Gait is intact.   Psychiatric:         Mood and Affect: Mood normal.       Neck Circumference: 15 inches  Mallampati Classification: II (hard and soft palate, upper portion of tonsils anduvula visible)       Results Review:      [place] Sleepiness Scale: 11    STOP-BANG: Moderate Risk BASSEM    Assessment/Plan     Impression:  • Obstructive sleep apnea  • Tremor  • Cervical Spondylosis  • Cervical Pain      Plan:  · I have expressed the need to continue with oxygen therapy at night.  I also explained the sleep study and  that he did show a moderate amount of sleep apnea despite not sleeping well. I have encouraged him to use the oxygen nightly versus as needed.      · Continue propranolol for tremor.      · Continue with orthopedic surgery for cervical changes and pain. He is happy he is now seeing them.    · I have recommended regular  cardiovascular exercise in the form of walking, biking or swimming 30-40 minutes at a time at least 3-4 times per week.    · Risks of untreated sleep apnea were discussed to include but not limited to HTN, heart disease, stroke, cardiac arrhythmia such as AFIB, and dementia.      The plan of care was fully discussed with the patient and they are in full agreement at this time.     Follow-Up:  • Return in about 6 months (around 3/23/2022).      YOLANDA Vu  09/23/21  12:06 CDT

## 2022-01-07 ENCOUNTER — TRANSCRIBE ORDERS (OUTPATIENT)
Dept: ADMINISTRATIVE | Facility: HOSPITAL | Age: 62
End: 2022-01-07

## 2022-01-07 ENCOUNTER — LAB (OUTPATIENT)
Dept: LAB | Facility: HOSPITAL | Age: 62
End: 2022-01-07

## 2022-01-07 DIAGNOSIS — E03.9 HYPOTHYROIDISM, ADULT: ICD-10-CM

## 2022-01-07 DIAGNOSIS — E09.69 DRUG-INDUCED DIABETES MELLITUS WITH HYPERLIPIDEMIA: ICD-10-CM

## 2022-01-07 DIAGNOSIS — E78.5 DRUG-INDUCED DIABETES MELLITUS WITH HYPERLIPIDEMIA: ICD-10-CM

## 2022-01-07 DIAGNOSIS — E03.9 HYPOTHYROIDISM, ADULT: Primary | ICD-10-CM

## 2022-01-07 DIAGNOSIS — Z01.810 ENCOUNTER FOR PREPROCEDURAL CARDIOVASCULAR EXAMINATION: Primary | ICD-10-CM

## 2022-01-07 DIAGNOSIS — Z01.812 PRE-PROCEDURAL LABORATORY EXAMINATIONS: ICD-10-CM

## 2022-01-07 LAB
ALBUMIN SERPL-MCNC: 4.6 G/DL (ref 3.5–5.2)
ALBUMIN/GLOB SERPL: 1.5 G/DL
ALP SERPL-CCNC: 107 U/L (ref 39–117)
ALT SERPL W P-5'-P-CCNC: 34 U/L (ref 1–41)
ANION GAP SERPL CALCULATED.3IONS-SCNC: 8 MMOL/L (ref 5–15)
APTT PPP: 25.4 SECONDS (ref 24.1–35)
AST SERPL-CCNC: 32 U/L (ref 1–40)
BASOPHILS # BLD AUTO: 0.07 10*3/MM3 (ref 0–0.2)
BASOPHILS NFR BLD AUTO: 0.8 % (ref 0–1.5)
BILIRUB SERPL-MCNC: 0.3 MG/DL (ref 0–1.2)
BUN SERPL-MCNC: 12 MG/DL (ref 8–23)
BUN/CREAT SERPL: 11.4 (ref 7–25)
CALCIUM SPEC-SCNC: 9.5 MG/DL (ref 8.6–10.5)
CHLORIDE SERPL-SCNC: 99 MMOL/L (ref 98–107)
CO2 SERPL-SCNC: 28 MMOL/L (ref 22–29)
CREAT SERPL-MCNC: 1.05 MG/DL (ref 0.76–1.27)
DEPRECATED RDW RBC AUTO: 43.7 FL (ref 37–54)
EOSINOPHIL # BLD AUTO: 0.22 10*3/MM3 (ref 0–0.4)
EOSINOPHIL NFR BLD AUTO: 2.6 % (ref 0.3–6.2)
ERYTHROCYTE [DISTWIDTH] IN BLOOD BY AUTOMATED COUNT: 13.4 % (ref 12.3–15.4)
GFR SERPL CREATININE-BSD FRML MDRD: 72 ML/MIN/1.73
GLOBULIN UR ELPH-MCNC: 3.1 GM/DL
GLUCOSE SERPL-MCNC: 104 MG/DL (ref 65–99)
HCT VFR BLD AUTO: 44.4 % (ref 37.5–51)
HGB BLD-MCNC: 15.1 G/DL (ref 13–17.7)
IMM GRANULOCYTES # BLD AUTO: 0.02 10*3/MM3 (ref 0–0.05)
IMM GRANULOCYTES NFR BLD AUTO: 0.2 % (ref 0–0.5)
INR PPP: 0.93 (ref 0.91–1.09)
LYMPHOCYTES # BLD AUTO: 2.37 10*3/MM3 (ref 0.7–3.1)
LYMPHOCYTES NFR BLD AUTO: 27.8 % (ref 19.6–45.3)
MCH RBC QN AUTO: 30 PG (ref 26.6–33)
MCHC RBC AUTO-ENTMCNC: 34 G/DL (ref 31.5–35.7)
MCV RBC AUTO: 88.3 FL (ref 79–97)
MONOCYTES # BLD AUTO: 0.63 10*3/MM3 (ref 0.1–0.9)
MONOCYTES NFR BLD AUTO: 7.4 % (ref 5–12)
NEUTROPHILS NFR BLD AUTO: 5.23 10*3/MM3 (ref 1.7–7)
NEUTROPHILS NFR BLD AUTO: 61.2 % (ref 42.7–76)
NRBC BLD AUTO-RTO: 0 /100 WBC (ref 0–0.2)
PLATELET # BLD AUTO: 211 10*3/MM3 (ref 140–450)
PMV BLD AUTO: 10.1 FL (ref 6–12)
POTASSIUM SERPL-SCNC: 4.4 MMOL/L (ref 3.5–5.2)
PROT SERPL-MCNC: 7.7 G/DL (ref 6–8.5)
PROTHROMBIN TIME: 12.1 SECONDS (ref 11.9–14.6)
RBC # BLD AUTO: 5.03 10*6/MM3 (ref 4.14–5.8)
SODIUM SERPL-SCNC: 135 MMOL/L (ref 136–145)
WBC NRBC COR # BLD: 8.54 10*3/MM3 (ref 3.4–10.8)

## 2022-01-07 PROCEDURE — 85025 COMPLETE CBC W/AUTO DIFF WBC: CPT

## 2022-01-07 PROCEDURE — 85730 THROMBOPLASTIN TIME PARTIAL: CPT

## 2022-01-07 PROCEDURE — 85610 PROTHROMBIN TIME: CPT

## 2022-01-07 PROCEDURE — 36415 COLL VENOUS BLD VENIPUNCTURE: CPT

## 2022-01-07 PROCEDURE — 80053 COMPREHEN METABOLIC PANEL: CPT

## 2022-03-16 DIAGNOSIS — R25.1 TREMOR: Primary | ICD-10-CM

## 2022-03-16 RX ORDER — PROPRANOLOL HYDROCHLORIDE 10 MG/1
TABLET ORAL
Qty: 30 TABLET | Refills: 5 | Status: SHIPPED | OUTPATIENT
Start: 2022-03-16 | End: 2022-09-23 | Stop reason: SDUPTHER

## 2022-03-24 ENCOUNTER — OFFICE VISIT (OUTPATIENT)
Dept: NEUROLOGY | Facility: CLINIC | Age: 62
End: 2022-03-24

## 2022-03-24 VITALS
HEIGHT: 70 IN | BODY MASS INDEX: 26.92 KG/M2 | WEIGHT: 188 LBS | RESPIRATION RATE: 18 BRPM | HEART RATE: 88 BPM | OXYGEN SATURATION: 98 % | SYSTOLIC BLOOD PRESSURE: 112 MMHG | DIASTOLIC BLOOD PRESSURE: 64 MMHG

## 2022-03-24 DIAGNOSIS — G47.33 OBSTRUCTIVE SLEEP APNEA: ICD-10-CM

## 2022-03-24 DIAGNOSIS — R25.1 TREMOR: Primary | ICD-10-CM

## 2022-03-24 PROCEDURE — 99214 OFFICE O/P EST MOD 30 MIN: CPT | Performed by: NURSE PRACTITIONER

## 2022-03-24 RX ORDER — SERTRALINE HYDROCHLORIDE 100 MG/1
100 TABLET, FILM COATED ORAL 2 TIMES DAILY
COMMUNITY
Start: 2022-03-14

## 2022-03-24 RX ORDER — CLONAZEPAM 1 MG/1
1 TABLET ORAL DAILY
COMMUNITY
Start: 2022-03-08

## 2022-03-24 RX ORDER — OMEPRAZOLE 40 MG/1
40 CAPSULE, DELAYED RELEASE ORAL DAILY
COMMUNITY
Start: 2022-03-21

## 2022-03-24 RX ORDER — GABAPENTIN 400 MG/1
400 CAPSULE ORAL 3 TIMES DAILY
COMMUNITY
Start: 2022-02-28 | End: 2022-11-01

## 2022-03-24 NOTE — PROGRESS NOTES
Neurology Progress Note      Chief Complaint:    Obstructive sleep apnea  Cervical Pain   Tremor    Subjective     Subjective:  Noe Caldera is a 61 y.o. male who presents to the office today for obstructive sleep apnea.  He is routinely followed by YOLANDA Callejas for primary care.  He was last seen by me on 9/23/2021.  At that time he had not been using any of his sleep devices including CPAP or oxygen.  I advised that he restart wearing these and using these as he does have underlying sleep apnea indicated through polysomnography.  He historically has not wanted to wear these as they are uncomfortable and he does not feel he needs them.    He presents today continuing not to use supplemental oxygen or CPAP therapy at night.  He continues to admit that he cannot sleep well with him and is not interested in use at this time.  I did review the risks associated with untreated sleep apnea and hypoxemia at night.  He states understanding to this.  He continues to have a STOP-BANG which is intermediate risk of BASSEM and a [place] Sleepiness Scale 4.     He continues to take propranolol 10 mg nightly for tremor and indicates that this is successfully treating his tremors.  He has no further complaints or questions regarding this.  He is tolerating propranolol well and effectively has no change in his tremor he is happy with this today.    He continues to follow with orthopedic surgery regarding his chronic cervical and lumbar pain.  He reports that he has had cervical and lumbar surgeries in the past which has significantly reduced his pain to a tolerable point.  He has no further questions or concerns about this today.    Past Medical History:   Diagnosis Date   • Arthritis    • Complex regional pain syndrome i of left lower limb    • Depression    • Hypertension    • Kidney stones    • Migraine    • Thyroid disease      Past Surgical History:   Procedure Laterality Date   • ANTERIOR LUMBAR EXPOSURE N/A 3/30/2018  "   Procedure: ANTERIOR LUMBAR EXPOSURE;  Surgeon: Samm Robb DO;  Location:  [facility] OR;  Service: Vascular   • ARM TENDON REPAIR Left     \"Rebuilding\" of tendons   • LEG SURGERY Right 2009    Removal of bone tumor   • LUMBAR LAMINECTOMY ANTERIOR LUMBAR INTERBODY FUSION Left 3/30/2018    Procedure: LUMBAR LAMINECTOMY ANTERIOR LUMBAR INTERBODY FUSION, Lumbar 5 to sacral 1 anterior interbody fusion and then posterior MIS L5 to S1 left decompression and instrumentation. O-arm;  Surgeon: Mehran Almaraz MD;  Location:  [facility] OR;  Service: Neurosurgery   • LUMBAR SPINE SURGERY       Family History   Problem Relation Age of Onset   • Cancer Father    • No Known Problems Mother      Social History     Tobacco Use   • Smoking status: Former Smoker     Packs/day: 0.25     Years: 20.00     Pack years: 5.00     Types: Cigarettes     Quit date: 2018     Years since quittin.0   • Smokeless tobacco: Never Used   Substance Use Topics   • Alcohol use: No   • Drug use: Not Currently     Types: Oxycodone     Comment: Hx of narcotic addiction     Medications:  Current Outpatient Medications   Medication Sig Dispense Refill   • Acetaminophen 500 MG coapsule Take 1 capsule by mouth Every 6 (Six) Hours As Needed. Takes 3000mg a day     • atorvastatin (LIPITOR) 20 MG tablet Take 20 mg by mouth Daily.     • clonazePAM (KlonoPIN) 1 MG tablet Take 1 mg by mouth Daily. as directed     • Elastic Bandages & Supports (ACE Elbow Brace) [facility] Please fit for R elbow brace, wear each night. Known ulnar neuropathy. 1 each 0   • gabapentin (NEURONTIN) 100 MG capsule Take 100 mg by mouth Daily.     • gabapentin (NEURONTIN) 400 MG capsule Take 400 mg by mouth 3 (Three) Times a Day.     • hydrocortisone 2.5 % cream APPLY ON THE SKIN TWICE A DAY     • levothyroxine (SYNTHROID, LEVOTHROID) 125 MCG tablet Take 125 mcg by mouth Daily.     • omega-3 acid ethyl esters (LOVAZA) 1 g capsule Take 1 g by mouth 4 (Four) Times a Day.   "   • omeprazole (priLOSEC) 40 MG capsule TAKE 1 CAPSULE BY MOUTH ONCE DAILY 30 MINUTES BEFORE BREAKFAST     • prazosin (MINIPRESS) 1 MG capsule TAKE 1 TO 2 CAPSULES BY MOUTH ONCE DAILY AT BEDTIME FOR NIGHTMARES     • propranolol (INDERAL) 10 MG tablet TAKE 1 TABLET BY MOUTH ONCE DAILY AT NIGHT 30 tablet 5   • sertraline (ZOLOFT) 100 MG tablet Take 100 mg by mouth 2 (Two) Times a Day.     • temazepam (RESTORIL) 15 MG capsule Take 15 mg by mouth every night at bedtime.     • tiZANidine HCl (ZANAFLEX PO) Take  by mouth.       No current facility-administered medications for this visit.     Current outpatient and discharge medications have been reconciled for the patient.  Reviewed by: YOLANDA Vu      Allergies:    Nsaids and Prednisone    Review of Systems:   Review of Systems   Musculoskeletal: Positive for arthralgias, back pain, gait problem, myalgias, neck pain and neck stiffness.   Psychiatric/Behavioral: Positive for sleep disturbance.   All other systems reviewed and are negative.        Objective      Vital Signs  Heart Rate:  [88] 88  Resp:  [18] 18  BP: (112)/(64) 112/64    Physical Exam:  Physical Exam  Constitutional:       Appearance: Normal appearance.   HENT:      Head: Normocephalic.   Eyes:      Extraocular Movements: Extraocular movements intact.      Pupils: Pupils are equal, round, and reactive to light.   Cardiovascular:      Rate and Rhythm: Normal rate and regular rhythm.      Pulses: Normal pulses.   Pulmonary:      Effort: Pulmonary effort is normal.   Musculoskeletal:         General: Normal range of motion.      Cervical back: Normal range of motion and neck supple.   Skin:     General: Skin is warm and dry.      Capillary Refill: Capillary refill takes less than 2 seconds.   Neurological:      Gait: Gait is intact.   Psychiatric:         Mood and Affect: Mood normal.       Neck Circumference: 16 inches  Mallampati Classification: II (hard and soft palate, upper portion of tonsils  "anduvula visible)       Results Review:      [place] Sleepiness Scale: 4    STOP-BANG: Moderate Risk BASSEM    Assessment/Plan     Impression:  • Obstructive sleep apnea  • Tremor  • Cervical Spondylosis  • Cervical Pain      Plan:  · I have, again, expressed the need to continue with oxygen therapy at night.  I also explained the sleep study and  that he did show a moderate amount of sleep apnea despite not sleeping well. I have encouraged him to use the oxygen nightly.      · Continue propranolol for tremor.      · Continue with orthopedic surgery for cervical changes and pain. He is happy he is now seeing them.    · I have recommended regular cardiovascular exercise in the form of walking, biking or swimming 30-40 minutes at a time at least 3-4 times per week.    · Risks of untreated sleep apnea were discussed to include but not limited to HTN, heart disease, stroke, cardiac arrhythmia such as AFIB, and dementia.    At this point Noe is very adamant about not continuing CPAP therapy or oxygen therapy.  Therefore, I will continue to follow this patient for tremor only.  I have explained to him the results of his previous polysomnography on 4/24/2019 which indicated sleep apnea with an AHI of 19.4 and how this is representative of sleep apnea.  I also again discussed with him in detail the risks associated untreated sleep apnea.  He states understanding to this and continues to wish to not use his CPAP.  I did advised him that if at any point he does wish to treat his obstructive sleep apnea with CPAP that I will be more than willing to reevaluate and restart this therapy.  He simply states he does not wish to use the CPAP or oxygen at night because he \"feels fine without it\".  He also indicates that he had more difficulties with sleep while using the CPAP versus not using CPAP.  After thorough discussion he states understanding and will continue to follow with me for tremors.    The plan of care was fully discussed " with the patient and they are in full agreement at this time.     Follow-Up:  Return in about 1 year (around 3/24/2023) for Tremor.      YOLANDA Vu  03/24/22  14:17 CDT     30 minutes was spent with face-to-face assessment, education, evaluation, counseling, plan formation, and documentation of this encounter.

## 2022-08-19 ENCOUNTER — TRANSCRIBE ORDERS (OUTPATIENT)
Dept: ADMINISTRATIVE | Facility: HOSPITAL | Age: 62
End: 2022-08-19

## 2022-08-19 DIAGNOSIS — M54.16 LUMBAR RADICULOPATHY: Primary | ICD-10-CM

## 2022-09-14 ENCOUNTER — HOSPITAL ENCOUNTER (OUTPATIENT)
Dept: MRI IMAGING | Facility: HOSPITAL | Age: 62
Discharge: HOME | End: 2022-09-14

## 2022-09-14 ENCOUNTER — HOSPITAL ENCOUNTER (OUTPATIENT)
Dept: CT IMAGING | Facility: HOSPITAL | Age: 62
Discharge: HOME | End: 2022-09-14

## 2022-09-14 DIAGNOSIS — M54.16 LUMBAR RADICULOPATHY: ICD-10-CM

## 2022-09-14 PROCEDURE — 72148 MRI LUMBAR SPINE W/O DYE: CPT

## 2022-09-14 PROCEDURE — 72131 CT LUMBAR SPINE W/O DYE: CPT

## 2022-09-23 DIAGNOSIS — R25.1 TREMOR: ICD-10-CM

## 2022-09-23 RX ORDER — PROPRANOLOL HYDROCHLORIDE 10 MG/1
TABLET ORAL
Qty: 30 TABLET | Refills: 5 | Status: SHIPPED | OUTPATIENT
Start: 2022-09-23 | End: 2023-03-23 | Stop reason: SDUPTHER

## 2022-09-23 NOTE — TELEPHONE ENCOUNTER
Caller: BHARAT    Relationship: PATIENT    Best call back number: [phone]  Requested Prescriptions:   Requested Prescriptions     Pending Prescriptions Disp Refills   • propranolol (INDERAL) 10 MG tablet 30 tablet 5     Sig: TAKE 1 TABLET BY MOUTH ONCE DAILY AT NIGHT        Pharmacy where request should be sent: [id number]    Additional details provided by patient: PATIENT HAS BEEN OUT OF MEDICATION FOR 2 DAYS    Does the patient have less than a 3 day supply:  [x] Yes  [] No    Kristian Shea Rep   09/23/22 11:01 CDT            [facility]  LIVER TRANSPLANT CLINIC     A/P  38 year old male s/p LKT 5/2016 for ETOH.  Liver doing very well.  Post transplant course c/b PTLD and thyroid ca. Oncology monitoring.   IS per Dr. Sidhu. No changes to medications today. I discussed with Dr. Sidhu.  Labs up to date.    Will check CMV and EBV given his symptoms. May also be anxiety related. Encouraged him to connect with PCP.  Will see back in 1 year     Laureen Galvan MD  Hepatology/ Liver Transplant  [facility]  ===================================================================  PCP: DR. David Moser        SUBJECTIVE  39 year old male s/p DDLKT 5/2016 ETOH.    Was in ED 2 d ago and stayed overnight. Thought possibly 2/2 panic attack but not sure. He went in because he felt SOB and shaky. Thought he might be in afib as he has in the past. All cardiac eval was normal. Wearing a holter. No fevers. For the last week he has felt cold and shaky.Occasional diarrhea. NO N/V.     EXPLANT: No HCC  IS: Prograf 3-5 and MMF, pred 5 (per Dr. Sidhu)  LABS: Up to date, excellent liver function.    Recent Labs   Lab Test 01/16/19  2235   PROTTOTAL 7.4   ALBUMIN 4.3   BILITOTAL 0.6   ALKPHOS 89   AST 17   ALT 24     REJECTION: None.  BILIARY ISSUES: None  STENT: Out  KIDNEY FUNCTION:   Creatinine   Date Value Ref Range Status   01/16/2019 1.39 (H) 0.66 - 1.25 mg/dL Final     BP: Good. Lisinopril, amlodipine  PREV:  No derm done. Flu shot done this year.  DISEASE RECURRENCE: Sober since prior to transplant  OTHER ISSUES:   PTLD last EBV March 2017 undetectable  Thyroid cancer, s/p thyroidectomy last surveillance July negative  Weight gain.   Had fistula out.     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 Playing games. Would like to get back to playing hockey but knows he needs to lose weight.  ROS: 10 point ROS neg other than the symptoms noted above in the HPI.     OBJECTIVE    GENERAL:  Very pleasant, well-appearing, in no acute distress.     HEENT:  No icterus, no oral lesions.    LYMPH:  No supraclavicular or cervical lymphadenopathy.    CARDIOVASCULAR:  Regular rate and rhythm.    CHEST:  Lungs are clear.    ABDOMEN:  Bowel sounds are present.  Abdomen is soft, nontender, nondistended.  Scar is well healed.      EXTREMITIES:  No edema.    SKIN:  No rash.  Multiple tattoos  NEUROLOGIC:  Speech is fluent and clear.  No asterixis or tremor.

## 2022-11-01 ENCOUNTER — HOSPITAL ENCOUNTER (OUTPATIENT)
Dept: GENERAL RADIOLOGY | Facility: HOSPITAL | Age: 62
Discharge: HOME | End: 2022-11-01

## 2022-11-01 ENCOUNTER — PRE-ADMISSION TESTING (OUTPATIENT)
Dept: PREADMISSION TESTING | Facility: HOSPITAL | Age: 62
End: 2022-11-01

## 2022-11-01 VITALS
OXYGEN SATURATION: 96 % | HEIGHT: 69 IN | RESPIRATION RATE: 18 BRPM | WEIGHT: 204.81 LBS | BODY MASS INDEX: 30.33 KG/M2 | DIASTOLIC BLOOD PRESSURE: 92 MMHG | SYSTOLIC BLOOD PRESSURE: 148 MMHG | HEART RATE: 80 BPM

## 2022-11-01 LAB
ALBUMIN SERPL-MCNC: 4.7 G/DL (ref 3.5–5.2)
ALBUMIN/GLOB SERPL: 1.8 G/DL
ALP SERPL-CCNC: 111 U/L (ref 39–117)
ALT SERPL W P-5'-P-CCNC: 21 U/L (ref 1–41)
ANION GAP SERPL CALCULATED.3IONS-SCNC: 12 MMOL/L (ref 5–15)
APTT PPP: 27 SECONDS (ref 24.1–35)
AST SERPL-CCNC: 30 U/L (ref 1–40)
BACTERIA UR QL AUTO: ABNORMAL /HPF
BILIRUB SERPL-MCNC: 0.5 MG/DL (ref 0–1.2)
BILIRUB UR QL STRIP: NEGATIVE
BUN SERPL-MCNC: 15 MG/DL (ref 8–23)
BUN/CREAT SERPL: 12.1 (ref 7–25)
CALCIUM SPEC-SCNC: 9.2 MG/DL (ref 8.6–10.5)
CHLORIDE SERPL-SCNC: 101 MMOL/L (ref 98–107)
CLARITY UR: CLEAR
CO2 SERPL-SCNC: 24 MMOL/L (ref 22–29)
COLOR UR: ABNORMAL
CREAT SERPL-MCNC: 1.24 MG/DL (ref 0.76–1.27)
DEPRECATED RDW RBC AUTO: 43.8 FL (ref 37–54)
EGFRCR SERPLBLD CKD-EPI 2021: 66.1 ML/MIN/1.73
ERYTHROCYTE [DISTWIDTH] IN BLOOD BY AUTOMATED COUNT: 13.9 % (ref 12.3–15.4)
GLOBULIN UR ELPH-MCNC: 2.6 GM/DL
GLUCOSE SERPL-MCNC: 149 MG/DL (ref 65–99)
GLUCOSE UR STRIP-MCNC: NEGATIVE MG/DL
HCT VFR BLD AUTO: 40.2 % (ref 37.5–51)
HGB BLD-MCNC: 13.9 G/DL (ref 13–17.7)
HGB UR QL STRIP.AUTO: NEGATIVE
HYALINE CASTS UR QL AUTO: ABNORMAL /LPF
INR PPP: 1.1 (ref 0.91–1.09)
KETONES UR QL STRIP: NEGATIVE
LEUKOCYTE ESTERASE UR QL STRIP.AUTO: ABNORMAL
MCH RBC QN AUTO: 29.9 PG (ref 26.6–33)
MCHC RBC AUTO-ENTMCNC: 34.6 G/DL (ref 31.5–35.7)
MCV RBC AUTO: 86.5 FL (ref 79–97)
NITRITE UR QL STRIP: NEGATIVE
PH UR STRIP.AUTO: 6 [PH] (ref 5–8)
PLATELET # BLD AUTO: 199 10*3/MM3 (ref 140–450)
PMV BLD AUTO: 9.9 FL (ref 6–12)
POTASSIUM SERPL-SCNC: 3.8 MMOL/L (ref 3.5–5.2)
PROT SERPL-MCNC: 7.3 G/DL (ref 6–8.5)
PROT UR QL STRIP: NEGATIVE
PROTHROMBIN TIME: 13.8 SECONDS (ref 11.9–14.6)
RBC # BLD AUTO: 4.65 10*6/MM3 (ref 4.14–5.8)
RBC # UR STRIP: ABNORMAL /HPF
REF LAB TEST METHOD: ABNORMAL
SODIUM SERPL-SCNC: 137 MMOL/L (ref 136–145)
SP GR UR STRIP: 1.02 (ref 1–1.03)
SQUAMOUS #/AREA URNS HPF: ABNORMAL /HPF
UROBILINOGEN UR QL STRIP: ABNORMAL
WBC # UR STRIP: ABNORMAL /HPF
WBC NRBC COR # BLD: 7.26 10*3/MM3 (ref 3.4–10.8)

## 2022-11-01 PROCEDURE — 85610 PROTHROMBIN TIME: CPT

## 2022-11-01 PROCEDURE — 93005 ELECTROCARDIOGRAM TRACING: CPT

## 2022-11-01 PROCEDURE — 93010 ELECTROCARDIOGRAM REPORT: CPT | Performed by: EMERGENCY MEDICINE

## 2022-11-01 PROCEDURE — 81001 URINALYSIS AUTO W/SCOPE: CPT

## 2022-11-01 PROCEDURE — 85027 COMPLETE CBC AUTOMATED: CPT

## 2022-11-01 PROCEDURE — 36415 COLL VENOUS BLD VENIPUNCTURE: CPT

## 2022-11-01 PROCEDURE — 80053 COMPREHEN METABOLIC PANEL: CPT

## 2022-11-01 PROCEDURE — 71045 X-RAY EXAM CHEST 1 VIEW: CPT

## 2022-11-01 PROCEDURE — 85730 THROMBOPLASTIN TIME PARTIAL: CPT

## 2022-11-01 RX ORDER — CLOTRIMAZOLE AND BETAMETHASONE DIPROPIONATE 10; .64 MG/G; MG/G
1 CREAM TOPICAL 2 TIMES DAILY
COMMUNITY

## 2022-11-01 RX ORDER — CYCLOBENZAPRINE HCL 10 MG
10 TABLET ORAL 3 TIMES DAILY PRN
COMMUNITY
End: 2023-03-23

## 2022-11-01 RX ORDER — HYDROXYZINE HYDROCHLORIDE 10 MG/1
10 TABLET, FILM COATED ORAL 3 TIMES DAILY PRN
COMMUNITY
End: 2023-03-23

## 2022-11-01 NOTE — DISCHARGE INSTRUCTIONS
Before you come to the hospital        Arrival time: AS DIRECTED BY OFFICE     YOU MAY TAKE THE FOLLOWING MEDICATION(S) THE MORNING OF SURGERY WITH A SIP OF WATER: CLONAZEPAM, PROPRANOLOL           ALL OTHER HOME MEDICATION CHECK WITH YOUR PHYSICIAN (especially if you are taking diabetes medicines or blood thinners)    Do not take any Erectile Dysfunction medications (EX: CIALIS, VIAGRA) 24 hours prior to surgery.      If you were given and instructed to use a germ- killing soap, use as directed the night before surgery and again the morning of surgery or as directed by your surgeon.    (See attached information for How to Use Chlorhexidine for Bathing if applicable.)            Eating and drinking restrictions prior to scheduled arrival time    2 Hours before arrival time STOP   Drinking Clear liquids (water, apple juice-no pulp)     6 Hours before arrival time STOP   Milk or drinks that contain milk, full liquids    6 Hours before arrival time STOP   Light meals or foods, such as toast or cereal    8 Hours before arrival time STOP   Heavy foods, such as meat, fried foods, or fatty foods    (It is extremely important that you follow these guidelines to prevent delay or cancelation of your procedure)     Clear Liquids  Water and flavored water                                                                      Clear Fruit juices, such as cranberry juice and apple juice.  Black coffee (NO cream of any kind, including powdered).  Plain tea  Clear bouillon or broth.  Flavored gelatin.  Soda.  Gatorade or Powerade.  Full liquid examples  Juices that have pulp.  Frozen ice pops that contain fruit pieces.  Coffee with creamer  Milk.  Yogurt.                MANAGING PAIN AFTER SURGERY    We know you are probably wondering what your pain will be like after surgery.  Following surgery it is unrealistic to expect you will not have pain.   Pain is how our bodies let us know that something is wrong or cautions us to be  careful.  That said, our goal is to make your pain tolerable.    Methods we may use to treat your pain include (oral or IV medications, PCAs, epidurals, nerve blocks, etc.)   While some procedures require IV pain medications for a short time after surgery, transitioning to pain medications by mouth allows for better management of pain.   Your nurse will encourage you to take oral pain medications whenever possible.  IV medications work almost immediately, but only last a short while.  Taking medications by mouth allows for a more constant level of medication in your blood stream for a longer period of time.      Once your pain is out of control it is harder to get back under control.  It is important you are aware when your next dose of pain medication is due.  If you are admitted, your nurse may write the time of your next dose on the white board in your room to help you remember.      We are interested in your pain and encourage you to inform us about aggravating factors during your visit.   Many times a simple repositioning every few hours can make a big difference.    If your physician says it is okay, do not let your pain prevent you from getting out of bed. Be sure to call your nurse for assistance prior to getting up so you do not fall.      Before surgery, please decide your tolerable pain goal.  These faces help describe the pain ratings we use on a 0-10 scale.   Be prepared to tell us your goal and whether or not you take pain or anxiety medications at home.          Preparing for Surgery  Preparing for surgery is an important part of your care. It can make things go more smoothly and help you avoid complications. The steps leading up to surgery may vary among hospitals. Follow all instructions given to you by your health care providers. Ask questions if you do not understand something. Talk about any concerns that you have.  Here are some questions to consider asking before your surgery:  If my surgery is  not an emergency (is elective), when would be the best time to have the surgery?  What arrangements do I need to make for work, home, or school?  What will my recovery be like? How long will it be before I can return to normal activities?  Will I need to prepare my home? Will I need to arrange care for me or my children?  Should I expect to have pain after surgery? What are my pain management options? Are there nonmedical options that I can try for pain?  Tell a health care provider about:  Any allergies you have.  All medicines you are taking, including vitamins, herbs, eye drops, creams, and over-the-counter medicines.  Any problems you or family members have had with anesthetic medicines.  Any blood disorders you have.  Any surgeries you have had.  Any medical conditions you have.  Whether you are pregnant or may be pregnant.  What are the risks?  The risks and complications of surgery depend on the specific procedure that you have. Discuss all the risks with your health care providers before your surgery. Ask about common surgical complications, which may include:  Infection.  Bleeding or a need for blood replacement (transfusion).  Allergic reactions to medicines.  Damage to surrounding nerves, tissues, or structures.  A blood clot.  Scarring.  Failure of the surgery to correct the problem.  Follow these instructions before the procedure:  Several days or weeks before your procedure  You may have a physical exam by your primary health care provider to make sure it is safe for you to have surgery.  You may have testing. This may include a chest X-ray, blood and urine tests, electrocardiogram (ECG), or other testing.  Ask your health care provider about:  Changing or stopping your regular medicines. This is especially important if you are taking diabetes medicines or blood thinners.  Taking medicines such as aspirin and ibuprofen. These medicines can thin your blood. Do not take these medicines unless your health  care provider tells you to take them.  Taking over-the-counter medicines, vitamins, herbs, and supplements.  Do not use any products that contain nicotine or tobacco, such as cigarettes and e-cigarettes. If you need help quitting, ask your health care provider.  Avoid alcohol.  Ask your health care provider if there are exercises you can do to prepare for surgery.  Eat a healthy diet.   Plan to have someone take you home from the hospital or clinic.  Plan to have a responsible adult care for you for at least 24 hours after you leave the hospital or clinic. This is important.  The day before your procedure  You may be given antibiotic medicine to take by mouth to help prevent infection. Take it as told by your health care provider.  You may be asked to shower with a germ-killing soap.  Follow instructions from your health care provider about eating and drinking restrictions. This includes gum, mints and hard candy.  Pack comfortable clothes according to your procedure.   The day of your procedure  You may need to take another shower with a germ-killing soap before you leave home in the morning.  With a small sip of water, take only the medicines that you are told to take.  Remove all jewelry including rings.   Leave anything you consider valuable at home except hearing aids if needed.  Do not wear any makeup, nail polish, powder, deodorant, lotion, hair accessories, or anything on your skin or body except your clothes.  If you will be staying in the hospital, bring a case to hold your glasses, contacts, or dentures. You may also want to bring your robe and non-skid footwear.  If you wear oxygen at home, bring it with you the day of surgery.  If instructed by your health care provider, bring your sleep apnea device with you on the day of your surgery (if this applies to you).  You may want to leave your suitcase and sleep apnea device in the car until after surgery.   Arrive at the hospital as scheduled.  Bring a  friend or family member with you who can help to answer questions and be present while you meet with your health care provider.  At the hospital  When you arrive at the hospital:  Go to registration located at the main entrance of the hospital. You will be registered and given a beeper and a sticker sheet. Take the stickers to the Outpatient nurses desk and place in the black tray. This is to notify staff that you have arrived. Then return to the lobby to wait.   When your beeper lights up and vibrates proceed through the double doors, under the stairs, and a member of the Outpatient Surgery staff will escort you to your preoperative room.  You may have to wear compression sleeves. These help to prevent blood clots and reduce swelling in your legs.  An IV may be inserted into one of your veins.              In the operating room, you may be given one or more of the following:        A medicine to help you relax (sedative).        A medicine to numb the area (local anesthetic).        A medicine to make you fall asleep (general anesthetic).        A medicine that is injected into an area of your body to numb everything below the                      injection site (regional anesthetic).  You may be given an antibiotic through your IV to help prevent infection.  Your surgical site will be marked or identified.    Contact a health care provider if you:  Develop a fever of more than 100.4°F (38°C) or other feelings of illness during the 48 hours before your surgery.  Have symptoms that get worse.  Have questions or concerns about your surgery.  Summary  Preparing for surgery can make the procedure go more smoothly and lower your risk of complications.  Before surgery, make a list of questions and concerns to discuss with your surgeon. Ask about the risks and possible complications.  In the days or weeks before your surgery, follow all instructions from your health care provider. You may need to stop smoking, avoid  alcohol, follow eating restrictions, and change or stop your regular medicines.  Contact your surgeon if you develop a fever or other signs of illness during the few days before your surgery.  This information is not intended to replace advice given to you by your health care provider. Make sure you discuss any questions you have with your health care provider.  Document Revised: 12/21/2018 Document Reviewed: 10/23/2018  Elsevier Patient Education © 2021 [employer]

## 2022-11-02 LAB
QT INTERVAL: 386 MS
QTC INTERVAL: 419 MS

## 2022-11-14 ENCOUNTER — ANESTHESIA EVENT (OUTPATIENT)
Dept: PERIOP | Facility: HOSPITAL | Age: 62
End: 2022-11-14

## 2022-11-14 ENCOUNTER — ANESTHESIA (OUTPATIENT)
Dept: PERIOP | Facility: HOSPITAL | Age: 62
End: 2022-11-14

## 2022-11-14 ENCOUNTER — APPOINTMENT (OUTPATIENT)
Dept: GENERAL RADIOLOGY | Facility: HOSPITAL | Age: 62
End: 2022-11-14

## 2022-11-14 ENCOUNTER — HOSPITAL ENCOUNTER (OUTPATIENT)
Facility: HOSPITAL | Age: 62
Setting detail: SURGERY ADMIT
Discharge: HOME | End: 2022-11-14
Attending: ORTHOPAEDIC SURGERY | Admitting: ORTHOPAEDIC SURGERY

## 2022-11-14 VITALS
SYSTOLIC BLOOD PRESSURE: 134 MMHG | WEIGHT: 215.17 LBS | HEART RATE: 67 BPM | BODY MASS INDEX: 31.87 KG/M2 | TEMPERATURE: 96.6 F | DIASTOLIC BLOOD PRESSURE: 98 MMHG | RESPIRATION RATE: 16 BRPM | OXYGEN SATURATION: 94 %

## 2022-11-14 DIAGNOSIS — Z98.1 S/P LUMBAR FUSION: Primary | ICD-10-CM

## 2022-11-14 PROCEDURE — 25010000002 ONDANSETRON PER 1 MG: Performed by: NURSE ANESTHETIST, CERTIFIED REGISTERED

## 2022-11-14 PROCEDURE — 76000 FLUOROSCOPY <1 HR PHYS/QHP: CPT

## 2022-11-14 PROCEDURE — 25010000002 HYDROMORPHONE PER 4 MG: Performed by: ANESTHESIOLOGY

## 2022-11-14 PROCEDURE — 72100 X-RAY EXAM L-S SPINE 2/3 VWS: CPT

## 2022-11-14 PROCEDURE — 25010000002 FENTANYL CITRATE (PF) 50 MCG/ML SOLUTION: Performed by: NURSE ANESTHETIST, CERTIFIED REGISTERED

## 2022-11-14 PROCEDURE — 86850 RBC ANTIBODY SCREEN: CPT | Performed by: ORTHOPAEDIC SURGERY

## 2022-11-14 PROCEDURE — 86901 BLOOD TYPING SEROLOGIC RH(D): CPT | Performed by: ORTHOPAEDIC SURGERY

## 2022-11-14 PROCEDURE — 25010000002 PROPOFOL 10 MG/ML EMULSION: Performed by: NURSE ANESTHETIST, CERTIFIED REGISTERED

## 2022-11-14 PROCEDURE — 0 BUPIVACAINE LIPOSOME 1.3 % SUSPENSION 20 ML VIAL: Performed by: ORTHOPAEDIC SURGERY

## 2022-11-14 PROCEDURE — 86900 BLOOD TYPING SEROLOGIC ABO: CPT | Performed by: ORTHOPAEDIC SURGERY

## 2022-11-14 PROCEDURE — 25010000002 FENTANYL CITRATE (PF) 50 MCG/ML SOLUTION: Performed by: ANESTHESIOLOGY

## 2022-11-14 PROCEDURE — 25010000002 CEFAZOLIN PER 500 MG: Performed by: ORTHOPAEDIC SURGERY

## 2022-11-14 PROCEDURE — C9290 INJ, BUPIVACAINE LIPOSOME: HCPCS | Performed by: ORTHOPAEDIC SURGERY

## 2022-11-14 PROCEDURE — 25010000002 DEXAMETHASONE PER 1 MG: Performed by: NURSE ANESTHETIST, CERTIFIED REGISTERED

## 2022-11-14 DEVICE — KT HEMOST ABS SURGIFOAM PORCN 1GRAM: Type: IMPLANTABLE DEVICE | Site: SPINE LUMBAR | Status: FUNCTIONAL

## 2022-11-14 RX ORDER — OXYCODONE HCL 20 MG/1
20 TABLET, FILM COATED, EXTENDED RELEASE ORAL ONCE
Status: COMPLETED | OUTPATIENT
Start: 2022-11-14 | End: 2022-11-14

## 2022-11-14 RX ORDER — NALOXONE HCL 0.4 MG/ML
0.04 VIAL (ML) INJECTION AS NEEDED
Status: DISCONTINUED | OUTPATIENT
Start: 2022-11-14 | End: 2022-11-14 | Stop reason: HOSPADM

## 2022-11-14 RX ORDER — FENTANYL CITRATE 50 UG/ML
50 INJECTION, SOLUTION INTRAMUSCULAR; INTRAVENOUS ONCE
Status: COMPLETED | OUTPATIENT
Start: 2022-11-14 | End: 2022-11-14

## 2022-11-14 RX ORDER — FLUMAZENIL 0.1 MG/ML
0.2 INJECTION INTRAVENOUS AS NEEDED
Status: DISCONTINUED | OUTPATIENT
Start: 2022-11-14 | End: 2022-11-14 | Stop reason: HOSPADM

## 2022-11-14 RX ORDER — PROPOFOL 10 MG/ML
VIAL (ML) INTRAVENOUS AS NEEDED
Status: DISCONTINUED | OUTPATIENT
Start: 2022-11-14 | End: 2022-11-14 | Stop reason: SURG

## 2022-11-14 RX ORDER — SODIUM CHLORIDE 0.9 % (FLUSH) 0.9 %
3-10 SYRINGE (ML) INJECTION AS NEEDED
Status: DISCONTINUED | OUTPATIENT
Start: 2022-11-14 | End: 2022-11-14 | Stop reason: HOSPADM

## 2022-11-14 RX ORDER — ONDANSETRON 2 MG/ML
INJECTION INTRAMUSCULAR; INTRAVENOUS AS NEEDED
Status: DISCONTINUED | OUTPATIENT
Start: 2022-11-14 | End: 2022-11-14 | Stop reason: SURG

## 2022-11-14 RX ORDER — LIDOCAINE HYDROCHLORIDE 10 MG/ML
0.5 INJECTION, SOLUTION EPIDURAL; INFILTRATION; INTRACAUDAL; PERINEURAL ONCE AS NEEDED
Status: DISCONTINUED | OUTPATIENT
Start: 2022-11-14 | End: 2022-11-14 | Stop reason: HOSPADM

## 2022-11-14 RX ORDER — SODIUM CHLORIDE 0.9 % (FLUSH) 0.9 %
3 SYRINGE (ML) INJECTION AS NEEDED
Status: DISCONTINUED | OUTPATIENT
Start: 2022-11-14 | End: 2022-11-14 | Stop reason: HOSPADM

## 2022-11-14 RX ORDER — SODIUM CHLORIDE, SODIUM LACTATE, POTASSIUM CHLORIDE, CALCIUM CHLORIDE 600; 310; 30; 20 MG/100ML; MG/100ML; MG/100ML; MG/100ML
1000 INJECTION, SOLUTION INTRAVENOUS CONTINUOUS
Status: DISCONTINUED | OUTPATIENT
Start: 2022-11-14 | End: 2022-11-14 | Stop reason: HOSPADM

## 2022-11-14 RX ORDER — SODIUM CHLORIDE 0.9 % (FLUSH) 0.9 %
3 SYRINGE (ML) INJECTION EVERY 12 HOURS SCHEDULED
Status: DISCONTINUED | OUTPATIENT
Start: 2022-11-14 | End: 2022-11-14 | Stop reason: HOSPADM

## 2022-11-14 RX ORDER — DROPERIDOL 2.5 MG/ML
0.62 INJECTION, SOLUTION INTRAMUSCULAR; INTRAVENOUS ONCE AS NEEDED
Status: DISCONTINUED | OUTPATIENT
Start: 2022-11-14 | End: 2022-11-14 | Stop reason: HOSPADM

## 2022-11-14 RX ORDER — HYDROMORPHONE HYDROCHLORIDE 1 MG/ML
0.5 INJECTION, SOLUTION INTRAMUSCULAR; INTRAVENOUS; SUBCUTANEOUS
Status: DISCONTINUED | OUTPATIENT
Start: 2022-11-14 | End: 2022-11-14 | Stop reason: HOSPADM

## 2022-11-14 RX ORDER — OXYCODONE AND ACETAMINOPHEN 7.5; 325 MG/1; MG/1
1 TABLET ORAL EVERY 4 HOURS PRN
Qty: 42 TABLET | Refills: 0 | Status: SHIPPED | OUTPATIENT
Start: 2022-11-14 | End: 2022-11-21

## 2022-11-14 RX ORDER — IBUPROFEN 600 MG/1
600 TABLET ORAL ONCE AS NEEDED
Status: DISCONTINUED | OUTPATIENT
Start: 2022-11-14 | End: 2022-11-14 | Stop reason: HOSPADM

## 2022-11-14 RX ORDER — FENTANYL CITRATE 50 UG/ML
INJECTION, SOLUTION INTRAMUSCULAR; INTRAVENOUS AS NEEDED
Status: DISCONTINUED | OUTPATIENT
Start: 2022-11-14 | End: 2022-11-14 | Stop reason: SURG

## 2022-11-14 RX ORDER — BUPIVACAINE HCL/0.9 % NACL/PF 0.1 %
2 PLASTIC BAG, INJECTION (ML) EPIDURAL ONCE
Status: COMPLETED | OUTPATIENT
Start: 2022-11-14 | End: 2022-11-14

## 2022-11-14 RX ORDER — SODIUM CHLORIDE, SODIUM LACTATE, POTASSIUM CHLORIDE, CALCIUM CHLORIDE 600; 310; 30; 20 MG/100ML; MG/100ML; MG/100ML; MG/100ML
100 INJECTION, SOLUTION INTRAVENOUS CONTINUOUS PRN
Status: DISCONTINUED | OUTPATIENT
Start: 2022-11-14 | End: 2022-11-14 | Stop reason: HOSPADM

## 2022-11-14 RX ORDER — SODIUM CHLORIDE, SODIUM LACTATE, POTASSIUM CHLORIDE, CALCIUM CHLORIDE 600; 310; 30; 20 MG/100ML; MG/100ML; MG/100ML; MG/100ML
100 INJECTION, SOLUTION INTRAVENOUS CONTINUOUS
Status: DISCONTINUED | OUTPATIENT
Start: 2022-11-14 | End: 2022-11-14 | Stop reason: HOSPADM

## 2022-11-14 RX ORDER — ACETAMINOPHEN 500 MG
1000 TABLET ORAL ONCE
Status: COMPLETED | OUTPATIENT
Start: 2022-11-14 | End: 2022-11-14

## 2022-11-14 RX ORDER — DEXAMETHASONE SODIUM PHOSPHATE 4 MG/ML
INJECTION, SOLUTION INTRA-ARTICULAR; INTRALESIONAL; INTRAMUSCULAR; INTRAVENOUS; SOFT TISSUE AS NEEDED
Status: DISCONTINUED | OUTPATIENT
Start: 2022-11-14 | End: 2022-11-14 | Stop reason: SURG

## 2022-11-14 RX ORDER — MIDAZOLAM HYDROCHLORIDE 1 MG/ML
1 INJECTION INTRAMUSCULAR; INTRAVENOUS
Status: DISCONTINUED | OUTPATIENT
Start: 2022-11-14 | End: 2022-11-14 | Stop reason: HOSPADM

## 2022-11-14 RX ORDER — SODIUM CHLORIDE 0.9 % (FLUSH) 0.9 %
10 SYRINGE (ML) INJECTION EVERY 12 HOURS SCHEDULED
Status: DISCONTINUED | OUTPATIENT
Start: 2022-11-14 | End: 2022-11-14 | Stop reason: HOSPADM

## 2022-11-14 RX ORDER — OXYCODONE AND ACETAMINOPHEN 7.5; 325 MG/1; MG/1
1 TABLET ORAL ONCE AS NEEDED
Status: DISCONTINUED | OUTPATIENT
Start: 2022-11-14 | End: 2022-11-14 | Stop reason: HOSPADM

## 2022-11-14 RX ORDER — SODIUM CHLORIDE 9 MG/ML
INJECTION, SOLUTION INTRAVENOUS AS NEEDED
Status: DISCONTINUED | OUTPATIENT
Start: 2022-11-14 | End: 2022-11-14 | Stop reason: HOSPADM

## 2022-11-14 RX ORDER — ROCURONIUM BROMIDE 10 MG/ML
INJECTION, SOLUTION INTRAVENOUS AS NEEDED
Status: DISCONTINUED | OUTPATIENT
Start: 2022-11-14 | End: 2022-11-14 | Stop reason: SURG

## 2022-11-14 RX ORDER — NEOSTIGMINE METHYLSULFATE 5 MG/5 ML
SYRINGE (ML) INTRAVENOUS AS NEEDED
Status: DISCONTINUED | OUTPATIENT
Start: 2022-11-14 | End: 2022-11-14 | Stop reason: SURG

## 2022-11-14 RX ORDER — LABETALOL HYDROCHLORIDE 5 MG/ML
5 INJECTION, SOLUTION INTRAVENOUS
Status: DISCONTINUED | OUTPATIENT
Start: 2022-11-14 | End: 2022-11-14 | Stop reason: HOSPADM

## 2022-11-14 RX ORDER — SODIUM CHLORIDE 0.9 % (FLUSH) 0.9 %
10 SYRINGE (ML) INJECTION AS NEEDED
Status: DISCONTINUED | OUTPATIENT
Start: 2022-11-14 | End: 2022-11-14 | Stop reason: HOSPADM

## 2022-11-14 RX ORDER — MAGNESIUM HYDROXIDE 1200 MG/15ML
LIQUID ORAL AS NEEDED
Status: DISCONTINUED | OUTPATIENT
Start: 2022-11-14 | End: 2022-11-14 | Stop reason: HOSPADM

## 2022-11-14 RX ORDER — ONDANSETRON 2 MG/ML
4 INJECTION INTRAMUSCULAR; INTRAVENOUS
Status: DISCONTINUED | OUTPATIENT
Start: 2022-11-14 | End: 2022-11-14 | Stop reason: HOSPADM

## 2022-11-14 RX ORDER — FENTANYL CITRATE 50 UG/ML
25 INJECTION, SOLUTION INTRAMUSCULAR; INTRAVENOUS
Status: DISCONTINUED | OUTPATIENT
Start: 2022-11-14 | End: 2022-11-14 | Stop reason: HOSPADM

## 2022-11-14 RX ORDER — OXYCODONE AND ACETAMINOPHEN 10; 325 MG/1; MG/1
1 TABLET ORAL ONCE AS NEEDED
Status: COMPLETED | OUTPATIENT
Start: 2022-11-14 | End: 2022-11-14

## 2022-11-14 RX ADMIN — ROCURONIUM BROMIDE 20 MG: 10 SOLUTION INTRAVENOUS at 14:29

## 2022-11-14 RX ADMIN — SODIUM CHLORIDE, POTASSIUM CHLORIDE, SODIUM LACTATE AND CALCIUM CHLORIDE 1000 ML: 600; 310; 30; 20 INJECTION, SOLUTION INTRAVENOUS at 08:14

## 2022-11-14 RX ADMIN — ONDANSETRON 4 MG: 2 INJECTION INTRAMUSCULAR; INTRAVENOUS at 14:27

## 2022-11-14 RX ADMIN — GLYCOPYRROLATE 0.6 MG: 0.2 INJECTION INTRAMUSCULAR; INTRAVENOUS at 15:13

## 2022-11-14 RX ADMIN — FENTANYL CITRATE 50 MCG: 50 INJECTION INTRAMUSCULAR; INTRAVENOUS at 12:09

## 2022-11-14 RX ADMIN — SODIUM CHLORIDE, POTASSIUM CHLORIDE, SODIUM LACTATE AND CALCIUM CHLORIDE: 600; 310; 30; 20 INJECTION, SOLUTION INTRAVENOUS at 14:51

## 2022-11-14 RX ADMIN — FENTANYL CITRATE 100 MCG: 50 INJECTION INTRAMUSCULAR; INTRAVENOUS at 14:33

## 2022-11-14 RX ADMIN — PROPOFOL 180 MG: 10 INJECTION, EMULSION INTRAVENOUS at 13:57

## 2022-11-14 RX ADMIN — FENTANYL CITRATE 25 MCG: 50 INJECTION INTRAMUSCULAR; INTRAVENOUS at 16:05

## 2022-11-14 RX ADMIN — LIDOCAINE HYDROCHLORIDE 100 MG: 20 INJECTION, SOLUTION INTRAVENOUS at 13:57

## 2022-11-14 RX ADMIN — ROCURONIUM BROMIDE 30 MG: 10 SOLUTION INTRAVENOUS at 13:57

## 2022-11-14 RX ADMIN — Medication 4 MG: at 15:13

## 2022-11-14 RX ADMIN — OXYCODONE AND ACETAMINOPHEN 1 TABLET: 325; 10 TABLET ORAL at 16:46

## 2022-11-14 RX ADMIN — Medication 2 G: at 14:05

## 2022-11-14 RX ADMIN — FENTANYL CITRATE 100 MCG: 50 INJECTION INTRAMUSCULAR; INTRAVENOUS at 13:57

## 2022-11-14 RX ADMIN — FENTANYL CITRATE 100 MCG: 50 INJECTION INTRAMUSCULAR; INTRAVENOUS at 14:27

## 2022-11-14 RX ADMIN — OXYCODONE HYDROCHLORIDE 20 MG: 20 TABLET, FILM COATED, EXTENDED RELEASE ORAL at 08:13

## 2022-11-14 RX ADMIN — DEXAMETHASONE SODIUM PHOSPHATE 4 MG: 4 INJECTION, SOLUTION INTRA-ARTICULAR; INTRALESIONAL; INTRAMUSCULAR; INTRAVENOUS; SOFT TISSUE at 14:27

## 2022-11-14 RX ADMIN — HYDROMORPHONE HYDROCHLORIDE 0.5 MG: 1 INJECTION, SOLUTION INTRAMUSCULAR; INTRAVENOUS; SUBCUTANEOUS at 15:59

## 2022-11-14 RX ADMIN — FENTANYL CITRATE 25 MCG: 50 INJECTION INTRAMUSCULAR; INTRAVENOUS at 16:10

## 2022-11-14 RX ADMIN — ACETAMINOPHEN 1000 MG: 500 TABLET ORAL at 12:09

## 2022-11-14 NOTE — NURSING NOTE
After receiving IV pain medication, pt became very confused and exhibited neuro changes.  Confusion regarding location and generalized neuro assessment questions.  Resolved before discharging to OP.    Pt ambulated from bed to stretcher - 2 person assist required.  Noted pt has diver restrictions and orders for home O2 (non-compliant)

## 2022-11-14 NOTE — OP NOTE
SPINAL HARDWARE REMOVAL, LUMBAR LAMINECTOMY POSTERIOR LUMBAR INTERBODY FUSION  Procedure Note    Noe Caldera  11/14/2022    Pre-op Diagnosis:     1. Status post previous left L5-S1 microdiskectomy, [place], [place], 2005.  2. Status post ALIF, PSF with instrumentation, L5-S1, Dr. Maximiliano Almaraz, 03/31/2017.  3. Status post artificial disc replacement, C5 to C7, 01/10/2022.  4. Residual low back pain.  5. Residual left buttock, thigh and leg radiculopathy.  6. Mild adjacent level degenerative disc disease, L4-5.  7. Mild adjacent level facet arthropathy L4-5.  8. Residual stenosis left L5-S1.    Post-op Diagnosis:    same    Procedure/CPT® Codes:    1.  Removal of posterior instrumentation left L5-S1  2.  Exploration of fusion L5-S1  3.  Revision left L5-S1 hemilaminectomy, facetectomy, foraminotomy decompression  4.  Use of fluoroscopy for confirmation of surgical level  5.  Use of operating room microscope for decompression  6.  Intraoperative neural monitoring    Anesthesia: General    Surgeon: MYLES Avery MD    Assistant: Maurice Higuera PA-C    Estimated Blood Loss: Minimal    Complications: None    Condition: Stable to PACU.    Indications:    The patient is a 62-year-old who sees Brynn Ansariins nurse practitioner for medical issues.  She presented to the office with a history of 2 prior lumbar procedures.  The first was a left L5-S1 microdiscectomy done in [facility] in 2005.  She then had an anterior and posterior fusion with instrumentation of L5-S1 performed by Dr. Maximiliano Almaraz on 3/31/2017.  Unfortunately, the patient states that after this procedure he continued to have residual low back pain along with residual left buttock, thigh, and leg radiculopathy.  Imaging studies revealed residual stenosis on the left side of L5-S1 with scar tissue on the undersurface of the L5-S1 facet causing stenosis that seem to match his symptoms.    After failing conservative measures, it was mutually decided that  surgery would be the best option. Risks, benefits, and complications of surgery were discussed with the patient. The patient appeared well informed and wished to proceed. We specifically discussed the risk of infection, blood loss, nerve root injury, CSF leak, and the possibility of incomplete resolution of symptoms.     Operative Procedure:    After obtaining informed consent and verifying the correct operative site, the patient was brought to the operating room. A general anesthetic was provided by the anesthesia service with the assistance of an endotracheal tube. Once this was appropriately positioned and secured, the patient was carefully rotated prone onto a Norbert frame. All bony processes were well-padded. The lumbar region was prepped and draped in the usual sterile fashion. A surgical timeout was taken to confirm this was the correct patient, we were working at the correct level, and that preoperative antibiotics were given in a timely fashion.   The previous left-sided Wiltsey incision spanning the existing instrumentation at L5-S1 was reopened using a 10 blade scalpel. Dissection was carried through subcutaneous tissues using Bovie cautery. The fascia was divided in line with the incision along the previous area of scar. Blunt dissection was once again taken between the multifidus and longissimus muscles down to the previously placed instrumentation spanning L5-S1. There was some scar tissue as expected around the screws and between the musculature but dissection was very smooth with very little blood loss. The previous instrumentation was completely exposed using Bovie cautery removing some fibrous scar tissue from around the screw heads themselves. The appropriate screwdriver and antitorque wrench were used to remove the setscrews. The florencio was then taken out with a Alla. The area was then thoroughly explored.      The pedicle screws on the left at both L5 and S1 appeared to be relatively tight with  no evidence of loosening.  The appropriate pedicle screwdriver was then used to remove the screws from L5 and S1.  I then explored the posterior lateral gutter and the fusion across L5-S1 was felt to be solid.     Bleeding from the pedicles was controlled using thrombin with Gelfoam powder. The wound was then copiously irrigated with saline solution.     Next using fluoroscopy, I confirmed the location of the L5-S1 facet joint.  Bovie cautery was used to expose the L5-S1 joint itself by lifting the multifidus.  I also exposed the L5 lamina and the L5-S1 interlaminar space.  Self-retaining retractors were placed for continuous exposure and the microscope was then positioned for the decompression portion of the procedure.    I used a high-speed bur to resect the majority of the L5-S1 facet.  This also was used to start the hemilaminectomy.  I used a forward angled curette to release scar tissue from the undersurface of the lamina and the laminectomy was completed using Kerrisons.  There is extensive scar tissue as expected from the previous surgery.  Once I was able to identify the central dural sac and the traversing S1 nerve root, I was able to identify the medial edge of the remaining facet joint.  There did appear to be significant compression upon the traversing S1 nerve root by this and the scar tissue itself.  After carefully dissecting the S1 nerve root free from the remaining facet, I completed the facetectomy using the high-speed bur and Kerrisons.  I extended the hemilaminectomy cranially and identified the L5 nerve root which was noted to be free of compression.  I explored the epidural space and did not see any residual disc material but there was some scar tissue that was excised using curettes and pituitaries.  At the end of the revision decompression, the central dural sac and the traversing S1 nerve root as well as the exiting L5 nerve root all appeared to be adequately decompressed.  Bleeding in the  epidural space was controlled using Floseal and bipolar cautery.    After insuring that we had adequate hemostasis, closure was then undertaken using a #1 Vicryl to reapproximate the fascia. Immediate subcutaneous tissues were closed with a 2-0 Vicryl and skin closure was then accomplished using Mastisol and Steri-Strips. The wound was then washed and sterilely dressed. The patient was carefully rotated supine onto a hospital gurney, extubated, and sent to the recovery room in good stable condition. The patient tolerated the procedure well, there were no complications.    Intraoperative neuro monitoring was ordered and carried out throughout the procedure to add an increased level of safety for the patient.  The interpreting physician was available by means of real-time continuous, bidirectional, remote audio and visual communication as needed throughout the entire procedure.  Modalities used during the procedure included SSEP, EMG, and TOF.  There were no neuro monitoring signal changes during the procedure.    Maurice Higuera PA-C provided critical assistance during the procedure.  His assistance was medically necessary in order to allow the procedure to occur in the most safe and efficient manner.    MYLES Avery MD     Date: 11/14/2022  Time: 15:35 CST

## 2022-11-14 NOTE — ANESTHESIA POSTPROCEDURE EVALUATION
Patient: Noe Caldera    Procedure Summary     Date: 11/14/22 Room / Location:  [facility] OR  /  [facility]    Anesthesia Start: 1352 Anesthesia Stop: 1552    Procedures:       REMOVAL OF INSTRUMENTATION, EXPLORATION L5-S1, REVISION LEFT L5-S1 HEMILAMINECTOMY, FACETECTOMY, DECOMPRESSION (Spine Lumbar)      EXPLORATION L5-S1, REVISION LEFT L5-S1 HEMILAMINECTOMY, FACETECTOMY, DECOMPRESSION (Spine Lumbar) Diagnosis: (M54.16)    Surgeons: SIOMARA Avery MD Provider: Ky Vines CRNA    Anesthesia Type: general ASA Status: 2          Anesthesia Type: general    Vitals  Vitals Value Taken Time   /82 11/14/22 1720   Temp 96.6 °F (35.9 °C) 11/14/22 1550   Pulse 66 11/14/22 1720   Resp 16 11/14/22 1720   SpO2 94 % 11/14/22 1720           Post Anesthesia Care and Evaluation    Patient location during evaluation: PACU  Patient participation: complete - patient participated  Level of consciousness: awake and alert  Pain management: adequate    Airway patency: patent  Anesthetic complications: No anesthetic complications    Cardiovascular status: acceptable  Respiratory status: acceptable  Hydration status: acceptable    Comments: Blood pressure 124/88, pulse 70, temperature 96.6 °F (35.9 °C), temperature source Temporal, resp. rate 16, weight 97.6 kg (215 lb 2.7 oz), SpO2 93 %.    Pt discharged from PACU based on alpesh score >8

## 2022-11-14 NOTE — ANESTHESIA PREPROCEDURE EVALUATION
Anesthesia Evaluation     Patient summary reviewed   no history of anesthetic complications:  NPO Solid Status: > 8 hours  NPO Liquid Status: > 8 hours           Airway   Mallampati: III  TM distance: >3 FB  Neck ROM: full  Dental - normal exam   (+) poor dentition        Pulmonary    (+) a smoker Former, sleep apnea,   (-) asthma, recent URI  Cardiovascular   Exercise tolerance: good (4-7 METS)    ECG reviewed    (-) pacemaker, hypertension, past MI, angina, cardiac stents, CABG      Neuro/Psych  (+) numbness,    (-) seizures, TIA, CVA  GI/Hepatic/Renal/Endo    (+)  GERD well controlled,  renal disease stones, thyroid problem   (-) liver disease, diabetes    Musculoskeletal     Abdominal    Substance History      OB/GYN          Other   arthritis,                        Anesthesia Plan    ASA 2     general     intravenous induction     Anesthetic plan, risks, benefits, and alternatives have been provided, discussed and informed consent has been obtained with: patient.    Use of blood products discussed with patient  Consented to blood products.

## 2022-11-14 NOTE — ANESTHESIA PROCEDURE NOTES
Airway  Urgency: elective    Date/Time: 11/14/2022 2:24 PM  Airway not difficult    General Information and Staff    Patient location during procedure: OR  CRNA/CAA: Ky Vines CRNA    Indications and Patient Condition  Indications for airway management: airway protection    Preoxygenated: yes  Mask difficulty assessment: 1 - vent by mask    Final Airway Details  Final airway type: endotracheal airway      Successful airway: ETT  Cuffed: yes   Successful intubation technique: direct laryngoscopy  Facilitating devices/methods: intubating stylet  Endotracheal tube insertion site: oral  Blade: Adnujar  Blade size: 2  ETT size (mm): 7.5  Cormack-Lehane Classification: grade IIa - partial view of glottis  Placement verified by: chest auscultation and capnometry   Cuff volume (mL): 8  Measured from: lips  ETT/EBT  to lips (cm): 21  Number of attempts at approach: 1  Assessment: lips, teeth, and gum same as pre-op    Additional Comments  Atraumatic intubation

## 2022-12-21 ENCOUNTER — TELEPHONE (OUTPATIENT)
Dept: NEUROLOGY | Facility: CLINIC | Age: 62
End: 2022-12-21

## 2022-12-21 NOTE — TELEPHONE ENCOUNTER
Provider: DR. BARRIOS  Caller: BHARAT  Relationship to Patient: SELF  Pharmacy: N/A  Phone Number: [phone]  Reason for Call: PATIENT CALLED, STATES HE HAS HAD AN OXYGEN MACHINE FOR 2 YEARS, HE DOES NOT USE IT. HE IS WANTING TO RETURN THE MACHINE.    THIS IS NOT A CPAP MACHINE    THANK YOU    When was the patient last seen: 03/24/2022

## 2022-12-21 NOTE — TELEPHONE ENCOUNTER
CALLED PATIENT TO LET HIM KNOW THAT WHOEVER GAVE HIM THE MACHINE, [employer], THAT WOULD BE WHO HE NEEDS TO CONTACT TO RETURN IT. LEFT HIM A DETAILED MESSAGE AND TO RETURN CALL WITH ANY QUESTIONS

## 2023-03-23 ENCOUNTER — OFFICE VISIT (OUTPATIENT)
Dept: NEUROLOGY | Facility: CLINIC | Age: 63
End: 2023-03-23
Payer: COMMERCIAL

## 2023-03-23 VITALS
BODY MASS INDEX: 29.2 KG/M2 | HEART RATE: 59 BPM | OXYGEN SATURATION: 96 % | WEIGHT: 204 LBS | HEIGHT: 70 IN | SYSTOLIC BLOOD PRESSURE: 126 MMHG | DIASTOLIC BLOOD PRESSURE: 76 MMHG

## 2023-03-23 DIAGNOSIS — R25.1 TREMOR: ICD-10-CM

## 2023-03-23 PROCEDURE — 1159F MED LIST DOCD IN RCRD: CPT | Performed by: NURSE PRACTITIONER

## 2023-03-23 PROCEDURE — 99213 OFFICE O/P EST LOW 20 MIN: CPT | Performed by: NURSE PRACTITIONER

## 2023-03-23 PROCEDURE — 1160F RVW MEDS BY RX/DR IN RCRD: CPT | Performed by: NURSE PRACTITIONER

## 2023-03-23 RX ORDER — ATORVASTATIN CALCIUM 40 MG/1
40 TABLET, FILM COATED ORAL
COMMUNITY
Start: 2023-01-06

## 2023-03-23 RX ORDER — HYDROXYZINE HYDROCHLORIDE 25 MG/1
25 TABLET, FILM COATED ORAL 4 TIMES DAILY PRN
COMMUNITY
Start: 2023-01-24

## 2023-03-23 RX ORDER — LEVOTHYROXINE SODIUM 175 UG/1
1 TABLET ORAL DAILY
COMMUNITY
Start: 2023-02-04

## 2023-03-23 RX ORDER — PROPRANOLOL HYDROCHLORIDE 10 MG/1
10 TABLET ORAL NIGHTLY
Qty: 30 TABLET | Refills: 11 | Status: SHIPPED | OUTPATIENT
Start: 2023-03-23

## 2023-03-23 NOTE — PROGRESS NOTES
Neurology Progress Note      Chief Complaint:    Tremor    Subjective   History of Present Illness:  Noe Caldera is a 62 y.o. male who presents today for tremor.  He is routinely followed by Brynn Garvey APRN for primary care.     Tremor  He reports he continues to do well with regard to his tremor.  He has been active tremor more so present in his right upper extremity but is also present to a much more minimal degree in his left upper extremity.  He states that he takes his propranolol 10 mg nightly and this helps significantly improve his tremor.  He states the only on occasion will he have some issues with fine motor tasks but otherwise he denies any issues with writing, using utensils to eat, or holding a cup.  He is pleased with the control of his tremor at this time.    Allergies:    Patient has no known allergies.    Medications:  Current Outpatient Medications   Medication Sig Dispense Refill   • atorvastatin (LIPITOR) 40 MG tablet Take 1 tablet by mouth.     • clonazePAM (KlonoPIN) 1 MG tablet Take 1 tablet by mouth Daily. as directed     • clotrimazole-betamethasone (LOTRISONE) 1-0.05 % cream Apply 1 application topically to the appropriate area as directed 2 (Two) Times a Day.     • hydrOXYzine (ATARAX) 25 MG tablet Take 1 tablet by mouth 4 (Four) Times a Day As Needed.     • levothyroxine (SYNTHROID, LEVOTHROID) 150 MCG tablet Take 1 tablet by mouth Daily.     • levothyroxine (SYNTHROID, LEVOTHROID) 175 MCG tablet Take 1 tablet by mouth Daily.     • omeprazole (priLOSEC) 40 MG capsule Take 1 capsule by mouth Daily.     • prazosin (MINIPRESS) 1 MG capsule Take 1 capsule by mouth Every Night.     • propranolol (INDERAL) 10 MG tablet Take 1 tablet by mouth Every Night. TAKE 1 TABLET BY MOUTH ONCE DAILY AT NIGHT 30 tablet 11   • sertraline (ZOLOFT) 100 MG tablet Take 1 tablet by mouth 2 (Two) Times a Day.       No current facility-administered medications for this visit.     Current outpatient  "and discharge medications have been reconciled for the patient.  Reviewed by: YOLANDA Vu    Past Medical History:  Past Medical History:   Diagnosis Date   • Anxiety    • Arthritis    • Complex regional pain syndrome i of left lower limb    • Depression    • Eczema    • Foraminal stenosis of lumbar region    • GERD (gastroesophageal reflux disease)    • Kidney stones    • Migraine    • Thyroid disease    • Tremors of nervous system      Past Surgical History:   Procedure Laterality Date   • ANTERIOR LUMBAR EXPOSURE N/A 3/30/2018    Procedure: ANTERIOR LUMBAR EXPOSURE;  Surgeon: Samm Robb DO;  Location:  [facility];  Service: Vascular   • ARM TENDON REPAIR Left 2011    \"Rebuilding\" of tendons   • HARDWARE REMOVAL N/A 11/14/2022    Procedure: REMOVAL OF INSTRUMENTATION, EXPLORATION L5-S1, REVISION LEFT L5-S1 HEMILAMINECTOMY, FACETECTOMY, DECOMPRESSION;  Surgeon: SIOMARA Avery MD;  Location:  [facility];  Service: Orthopedic Spine;  Laterality: N/A;   • LEG SURGERY Right 2009    Removal of bone tumor   • LUMBAR LAMINECTOMY ANTERIOR LUMBAR INTERBODY FUSION Left 3/30/2018    Procedure: LUMBAR LAMINECTOMY ANTERIOR LUMBAR INTERBODY FUSION, Lumbar 5 to sacral 1 anterior interbody fusion and then posterior MIS L5 to S1 left decompression and instrumentation. O-arm;  Surgeon: Mehran Almaraz MD;  Location:  [facility];  Service: Neurosurgery   • LUMBAR LAMINECTOMY WITH FUSION N/A 11/14/2022    Procedure: EXPLORATION L5-S1, REVISION LEFT L5-S1 HEMILAMINECTOMY, FACETECTOMY, DECOMPRESSION;  Surgeon: SIOMARA Avery MD;  Location:  [facility];  Service: Orthopedic Spine;  Laterality: N/A;   • LUMBAR SPINE SURGERY  2005     Family History   Problem Relation Age of Onset   • Cancer Father    • No Known Problems Mother      Social History     Tobacco Use   • Smoking status: Former     Packs/day: 0.25     Years: 20.00     Pack years: 5.00     Types: Cigarettes     Quit date: 10/18/2022     Years since " "quittin.4   • Smokeless tobacco: Never   Vaping Use   • Vaping Use: Never used   Substance Use Topics   • Alcohol use: No   • Drug use: Not Currently     Types: Oxycodone     Comment: Hx of narcotic addiction     Review of Systems      Objective   Vital Signs:  Heart Rate:  [59] 59  BP: (126)/(76) 126/76      23  1304   Weight: 92.5 kg (204 lb)     177.8 cm (70\")  Body mass index is 29.27 kg/m².    Physical Exam  Neurological Exam    Results Review:    Lab Results   Component Value Date    GLUCOSE 149 (H) 2022    BUN 15 2022    CREATININE 1.24 2022    EGFRIFNONA 72 2022    BCR 12.1 2022    K 3.8 2022    CO2 24.0 2022    CALCIUM 9.2 2022    ALBUMIN 4.70 2022    AST 30 2022    ALT 21 2022     Lab Results   Component Value Date    WBC 7.26 2022    HGB 13.9 2022    HCT 40.2 2022    MCV 86.5 2022     2022     Lab Results   Component Value Date    CHOL 343 (H) 2018    TRIG 345 (H) 2018    HDL 46 2018     (H) 2018     Lab Results   Component Value Date    TSH 3.710 2018     No results found for: HGBA1C  Lab Results   Component Value Date    FOLATE >20.00 2018     Lab Results   Component Value Date    [id number] 314 2018        Plan .  Assessment:  Noe Caldera is a 62 y.o. male who presents for follow-up of tremor.  He continues to do well with great control of his tremor with primidone 10 mg nightly.  He did have a very minimal observable tremor in his right upper extremity but it was minimal upon examination.  He otherwise has no other obvious tremor.  He remained stable at this time and I continue to recommend propranolol.  Safety factors were discussed with the patient.    Plan:  • Continue propranolol 10 mg nightly  • Safety discussed  • Call with any worsening tremor.    The patient and I have discussed the plan of care and he is in full agreement at this " time.     Follow-Up:  Return in about 1 year (around 3/23/2024) for Tremor.       Cesar Stallings, YOLANDA  03/23/23  14:45 CDT

## (undated) DEVICE — SUT SILK 2/0 SH 75CM 30IN BLK C016D

## (undated) DEVICE — SPNG GZ WOVN 4X4IN 12PLY 10/BX STRL

## (undated) DEVICE — SPHR MARKR STEALTH STATION

## (undated) DEVICE — ANTIBACTERIAL UNDYED BRAIDED (POLYGLACTIN 910), SYNTHETIC ABSORBABLE SUTURE: Brand: COATED VICRYL

## (undated) DEVICE — GLV SURG BIOGEL M LTX PF 7

## (undated) DEVICE — APPL CHLORAPREP HI/LITE 26ML ORNG

## (undated) DEVICE — 4-PORT MANIFOLD: Brand: NEPTUNE 2

## (undated) DEVICE — APPL CHLORAPREP W/TINT 26ML ORNG

## (undated) DEVICE — TOTAL TRAY, 16FR 10ML SIL FOLEY, URN: Brand: MEDLINE

## (undated) DEVICE — LP VESL MAXI RED 2PK

## (undated) DEVICE — SUT VIC 0 MO4 CR8 18IN VCP701D

## (undated) DEVICE — DRSNG TELFA PAD NONADH STR 1S 3X8IN

## (undated) DEVICE — GLV SURG BIOGEL LTX PF 7 1/2

## (undated) DEVICE — YANKAUER SUCTION INSTRUMENT WITHOUT CONTROL VENT, OPEN TIP, CLEAR: Brand: YANKAUER

## (undated) DEVICE — KT PKIT PROAXIS W/PRONEVIEW ACP TBG

## (undated) DEVICE — PK SPINE POST 30

## (undated) DEVICE — 3.0MM PRECISION NEURO (MATCH HEAD)

## (undated) DEVICE — SUT MNCRYL 4/0 PS2 27IN UD MCP426H

## (undated) DEVICE — SPK10277 JACKSON/PRO-AXIS KIT: Brand: SPK10277 JACKSON/PRO-AXIS KIT

## (undated) DEVICE — DRAPE,UTILITY,TAPE,15X26,STERILE: Brand: MEDLINE

## (undated) DEVICE — PAD MINOR UNIVERSAL: Brand: MEDLINE INDUSTRIES, INC.

## (undated) DEVICE — GLV SURG NEOLON 2G PF LF 7.5 STRL

## (undated) DEVICE — CVR HNDL LIGHT RIGID

## (undated) DEVICE — SCANLAN® SURG-I-PAW® INSTRUMENT COVERS - RED, 1/10" X 5"/ 3 MMX13 CM (2 - 5" PCS /PKG): Brand: SCANLAN® SURG-I-PAW® INSTRUMENT COVERS

## (undated) DEVICE — ANTIBACTERIAL VIOLET BRAIDED (POLYGLACTIN 910), SYNTHETIC ABSORBABLE SUTURE: Brand: COATED VICRYL

## (undated) DEVICE — PK TURNOVER RM ADV

## (undated) DEVICE — CLTH CLENS READYCLEANSE PERI CARE PK/5

## (undated) DEVICE — CODMAN® SURGICAL PATTIES 1/2" X 3" (1.27CM X 7.62CM): Brand: CODMAN®

## (undated) DEVICE — GLV SURG GRN DERMASSURE LF PF 7.5

## (undated) DEVICE — POOLE SUCTION INSTRUMENT WITH REMOVABLE SHEATH: Brand: POOLE

## (undated) DEVICE — GLV SURG BIOGEL LTX PF 6 1/2

## (undated) DEVICE — 3M™ STERI-DRAPE™ INSTRUMENT POUCH 1018: Brand: STERI-DRAPE™

## (undated) DEVICE — ELECTRD BLD EDGE/INSUL1P 2.4X5.1MM STRL

## (undated) DEVICE — TRAP FLD MINIVAC MEGADYNE 100ML

## (undated) DEVICE — CONN FLX BREATHE CIRCT

## (undated) DEVICE — DECANTER: Brand: UNBRANDED

## (undated) DEVICE — SUT PROLN 5/0 C1 DA 24IN 8725H

## (undated) DEVICE — PROXIMATE RH ROTATING HEAD SKIN STAPLERS (35 WIDE) CONTAINS 35 STAINLESS STEEL STAPLES: Brand: PROXIMATE

## (undated) DEVICE — DISPOSABLE DRAPE, STERILE, FOR A CDS-3072 6 FOOT TABLE: Brand: PEDIGO PRODUCTS, INC.

## (undated) DEVICE — SUT SILK 0 SUTUPAK TIES 24IN SA76G

## (undated) DEVICE — NDL TP BVL JAMSHIDI ACC GUIDE ARCUS

## (undated) DEVICE — BIPOLAR SEALER 23-113-1 AQM 2.3: Brand: AQUAMANTYS™

## (undated) DEVICE — GLV SURG BIOGEL LTX PF 8

## (undated) DEVICE — ELECTRD BLD EZ CLN MOD XLNG 2.75IN

## (undated) DEVICE — GLV SURG TRIUMPH GREEN W/ALOE PF LTX 7 STRL

## (undated) DEVICE — ELECTRD BLD EXT EDGE 1P COAT 6.5IN STRL

## (undated) DEVICE — BAPTIST TURNOVER KIT: Brand: MEDLINE INDUSTRIES, INC.

## (undated) DEVICE — GLV SURG SENSICARE W/ALOE PF LF 7.5 STRL

## (undated) DEVICE — SUT SILK 3/0 SUTUPAK TIES 24IN SA74H

## (undated) DEVICE — CATH IV ANGIO FEP 12G 3IN LTBLU 10PK

## (undated) DEVICE — GOWN,NON-REINFORCED,SIRUS,SET IN SLV,XL: Brand: MEDLINE

## (undated) DEVICE — DRP C/ARMOR

## (undated) DEVICE — GLV SURG DERMASSURE GRN LF PF 8.0

## (undated) DEVICE — SUT SILK 4/0 SUTUPAK TIES 24IN SA73H

## (undated) DEVICE — GLV SURG TRIUMPH GREEN W/ALOE PF LTX 8 STRL

## (undated) DEVICE — SPNG DISSCT SECTO KTTNER PK/5

## (undated) DEVICE — CVR UNIV C/ARM

## (undated) DEVICE — ELECTRD BLD EXT EDGE/INSUL 1P 4IN

## (undated) DEVICE — KNIF BAYO METRX DISECT

## (undated) DEVICE — SUT SILK 2/0 SUTUPAK TIES 24IN SA75H

## (undated) DEVICE — SUT SILK 3/0 SH CR8 30IN C017D